# Patient Record
Sex: MALE | Race: ASIAN | NOT HISPANIC OR LATINO
[De-identification: names, ages, dates, MRNs, and addresses within clinical notes are randomized per-mention and may not be internally consistent; named-entity substitution may affect disease eponyms.]

---

## 2017-05-26 ENCOUNTER — NON-APPOINTMENT (OUTPATIENT)
Age: 62
End: 2017-05-26

## 2017-05-26 ENCOUNTER — APPOINTMENT (OUTPATIENT)
Dept: INTERNAL MEDICINE | Facility: CLINIC | Age: 62
End: 2017-05-26

## 2017-05-26 VITALS
HEIGHT: 63.5 IN | SYSTOLIC BLOOD PRESSURE: 135 MMHG | DIASTOLIC BLOOD PRESSURE: 84 MMHG | BODY MASS INDEX: 19.78 KG/M2 | WEIGHT: 113 LBS | HEART RATE: 83 BPM

## 2017-05-26 DIAGNOSIS — S29.012A STRAIN OF MUSCLE AND TENDON OF BACK WALL OF THORAX, INITIAL ENCOUNTER: ICD-10-CM

## 2017-05-26 DIAGNOSIS — Z87.891 PERSONAL HISTORY OF NICOTINE DEPENDENCE: ICD-10-CM

## 2017-05-26 DIAGNOSIS — M77.8 OTHER ENTHESOPATHIES, NOT ELSEWHERE CLASSIFIED: ICD-10-CM

## 2017-05-26 LAB
BILIRUB UR QL STRIP: NEGATIVE
CLARITY UR: CLEAR
COLLECTION METHOD: NORMAL
GLUCOSE UR-MCNC: NEGATIVE
HCG UR QL: 0.2 EU/DL
HGB UR QL STRIP.AUTO: NEGATIVE
KETONES UR-MCNC: NEGATIVE
LEUKOCYTE ESTERASE UR QL STRIP: NEGATIVE
NITRITE UR QL STRIP: NEGATIVE
PH UR STRIP: 6
PROT UR STRIP-MCNC: NEGATIVE
SP GR UR STRIP: 1.01

## 2017-05-30 LAB
25(OH)D3 SERPL-MCNC: 17 NG/ML
ALBUMIN SERPL ELPH-MCNC: 4.2 G/DL
ALP BLD-CCNC: 107 U/L
ALT SERPL-CCNC: 9 U/L
ANION GAP SERPL CALC-SCNC: 16 MMOL/L
AST SERPL-CCNC: 26 U/L
BASOPHILS # BLD AUTO: 0.05 K/UL
BASOPHILS NFR BLD AUTO: 0.7 %
BILIRUB SERPL-MCNC: 0.3 MG/DL
BUN SERPL-MCNC: 11 MG/DL
CALCIUM SERPL-MCNC: 9.2 MG/DL
CHLORIDE SERPL-SCNC: 101 MMOL/L
CHOLEST SERPL-MCNC: 169 MG/DL
CHOLEST/HDLC SERPL: 7.3 RATIO
CO2 SERPL-SCNC: 20 MMOL/L
CREAT SERPL-MCNC: 0.74 MG/DL
EOSINOPHIL # BLD AUTO: 0.38 K/UL
EOSINOPHIL NFR BLD AUTO: 5.3 %
GLUCOSE SERPL-MCNC: 71 MG/DL
HBA1C MFR BLD HPLC: 6 %
HCT VFR BLD CALC: 42.6 %
HDLC SERPL-MCNC: 23 MG/DL
HGB BLD-MCNC: 14 G/DL
IMM GRANULOCYTES NFR BLD AUTO: 0.1 %
LDLC SERPL CALC-MCNC: 106 MG/DL
LYMPHOCYTES # BLD AUTO: 2.3 K/UL
LYMPHOCYTES NFR BLD AUTO: 32.2 %
MAN DIFF?: NORMAL
MCHC RBC-ENTMCNC: 32.3 PG
MCHC RBC-ENTMCNC: 32.9 GM/DL
MCV RBC AUTO: 98.4 FL
MONOCYTES # BLD AUTO: 0.34 K/UL
MONOCYTES NFR BLD AUTO: 4.8 %
NEUTROPHILS # BLD AUTO: 4.07 K/UL
NEUTROPHILS NFR BLD AUTO: 56.9 %
PLATELET # BLD AUTO: 258 K/UL
POTASSIUM SERPL-SCNC: 3.9 MMOL/L
PROT SERPL-MCNC: 7.9 G/DL
PSA SERPL-MCNC: 0.95 NG/ML
RBC # BLD: 4.33 M/UL
RBC # FLD: 13.9 %
SAVE SPECIMEN: NORMAL
SODIUM SERPL-SCNC: 137 MMOL/L
T3FREE SERPL-MCNC: 2.8 PG/ML
T4 SERPL-MCNC: 5.8 UG/DL
TRIGL SERPL-MCNC: 202 MG/DL
TSH SERPL-ACNC: 1.7 UIU/ML
URATE SERPL-MCNC: 4.7 MG/DL
WBC # FLD AUTO: 7.15 K/UL

## 2017-06-06 ENCOUNTER — MESSAGE (OUTPATIENT)
Age: 62
End: 2017-06-06

## 2017-06-12 ENCOUNTER — APPOINTMENT (OUTPATIENT)
Dept: UROLOGY | Facility: CLINIC | Age: 62
End: 2017-06-12

## 2017-06-14 ENCOUNTER — APPOINTMENT (OUTPATIENT)
Dept: UROLOGY | Facility: CLINIC | Age: 62
End: 2017-06-14

## 2017-06-23 ENCOUNTER — APPOINTMENT (OUTPATIENT)
Dept: OPHTHALMOLOGY | Facility: CLINIC | Age: 62
End: 2017-06-23

## 2017-06-27 ENCOUNTER — APPOINTMENT (OUTPATIENT)
Dept: INTERNAL MEDICINE | Facility: CLINIC | Age: 62
End: 2017-06-27

## 2017-07-05 ENCOUNTER — NON-APPOINTMENT (OUTPATIENT)
Age: 62
End: 2017-07-05

## 2017-07-05 ENCOUNTER — APPOINTMENT (OUTPATIENT)
Dept: INTERNAL MEDICINE | Facility: CLINIC | Age: 62
End: 2017-07-05

## 2017-07-05 VITALS
SYSTOLIC BLOOD PRESSURE: 117 MMHG | BODY MASS INDEX: 19.12 KG/M2 | DIASTOLIC BLOOD PRESSURE: 79 MMHG | HEART RATE: 71 BPM | HEIGHT: 64 IN | WEIGHT: 112 LBS

## 2017-07-10 ENCOUNTER — APPOINTMENT (OUTPATIENT)
Dept: OPHTHALMOLOGY | Facility: HOSPITAL | Age: 62
End: 2017-07-10

## 2017-07-10 ENCOUNTER — OUTPATIENT (OUTPATIENT)
Dept: OUTPATIENT SERVICES | Facility: HOSPITAL | Age: 62
LOS: 1 days | End: 2017-07-10
Payer: MEDICAID

## 2017-07-10 ENCOUNTER — TRANSCRIPTION ENCOUNTER (OUTPATIENT)
Age: 62
End: 2017-07-10

## 2017-07-10 VITALS
HEIGHT: 65 IN | OXYGEN SATURATION: 97 % | DIASTOLIC BLOOD PRESSURE: 74 MMHG | RESPIRATION RATE: 20 BRPM | TEMPERATURE: 98 F | WEIGHT: 110.23 LBS | SYSTOLIC BLOOD PRESSURE: 120 MMHG | HEART RATE: 69 BPM

## 2017-07-10 VITALS
RESPIRATION RATE: 21 BRPM | OXYGEN SATURATION: 100 % | SYSTOLIC BLOOD PRESSURE: 125 MMHG | DIASTOLIC BLOOD PRESSURE: 81 MMHG | HEART RATE: 71 BPM

## 2017-07-10 DIAGNOSIS — Z98.890 OTHER SPECIFIED POSTPROCEDURAL STATES: Chronic | ICD-10-CM

## 2017-07-10 DIAGNOSIS — H25.811 COMBINED FORMS OF AGE-RELATED CATARACT, RIGHT EYE: ICD-10-CM

## 2017-07-10 PROCEDURE — 66984 XCAPSL CTRC RMVL W/O ECP: CPT | Mod: RT

## 2017-07-10 PROCEDURE — C1780: CPT

## 2017-07-10 NOTE — ASU DISCHARGE PLAN (ADULT/PEDIATRIC). - SPECIAL INSTRUCTIONS
Remove patch after 3 hours.  Throw away the white pad underneath the clear shield.  Start the following DURING THE DAY ONLY (not at night when sleeping):   Ofloxacin 1 drop every 4 hours,   Ketorolac 1 drop 2 times a day, and   Prednisolone acetate (shake well before using!) 1 drop every 2 hours.    Wear clear shield when napping or sleeping at night.  Do not rub eye as there are no stitches in the eye!  No showering tonight.    Please call [phone] if you have any questions or if you have pain or vomiting despite taking Tylenol (after 4:30 PM-9am).  Please call [phone] or [phone] during the day (9am-4:30PM) if you have any questions or concerns or pain or vomiting despite taking Tylenol.

## 2017-07-10 NOTE — ASU DISCHARGE PLAN (ADULT/PEDIATRIC). - NOTIFY
Bleeding that does not stop/Persistent Nausea and Vomiting/Swelling that continues/Pain not relieved by Medications/Fever greater than 101

## 2017-07-10 NOTE — ASU PATIENT PROFILE, ADULT - PMH
Anxiety    Benign prostatic hypertrophy    Bruxism    Cigarette smoker    HTN (hypertension)    TIA (transient ischemic attack)

## 2017-07-11 ENCOUNTER — APPOINTMENT (OUTPATIENT)
Dept: OPHTHALMOLOGY | Facility: CLINIC | Age: 62
End: 2017-07-11

## 2017-07-17 ENCOUNTER — OUTPATIENT (OUTPATIENT)
Dept: OUTPATIENT SERVICES | Facility: HOSPITAL | Age: 62
LOS: 1 days | End: 2017-07-17
Payer: MEDICAID

## 2017-07-17 ENCOUNTER — APPOINTMENT (OUTPATIENT)
Dept: OPHTHALMOLOGY | Facility: HOSPITAL | Age: 62
End: 2017-07-17

## 2017-07-17 ENCOUNTER — TRANSCRIPTION ENCOUNTER (OUTPATIENT)
Age: 62
End: 2017-07-17

## 2017-07-17 VITALS
DIASTOLIC BLOOD PRESSURE: 70 MMHG | RESPIRATION RATE: 18 BRPM | OXYGEN SATURATION: 99 % | SYSTOLIC BLOOD PRESSURE: 115 MMHG | HEART RATE: 80 BPM

## 2017-07-17 VITALS
WEIGHT: 110.67 LBS | TEMPERATURE: 98 F | OXYGEN SATURATION: 97 % | RESPIRATION RATE: 19 BRPM | HEART RATE: 77 BPM | HEIGHT: 64.5 IN | DIASTOLIC BLOOD PRESSURE: 75 MMHG | SYSTOLIC BLOOD PRESSURE: 114 MMHG

## 2017-07-17 DIAGNOSIS — Z98.890 OTHER SPECIFIED POSTPROCEDURAL STATES: Chronic | ICD-10-CM

## 2017-07-17 DIAGNOSIS — Z98.41 CATARACT EXTRACTION STATUS, RIGHT EYE: Chronic | ICD-10-CM

## 2017-07-17 DIAGNOSIS — H25.812 COMBINED FORMS OF AGE-RELATED CATARACT, LEFT EYE: ICD-10-CM

## 2017-07-17 PROCEDURE — C1780: CPT

## 2017-07-17 PROCEDURE — 66984 XCAPSL CTRC RMVL W/O ECP: CPT | Mod: LT

## 2017-07-17 NOTE — ASU DISCHARGE PLAN (ADULT/PEDIATRIC). - SPECIAL INSTRUCTIONS
Remove patch after 3 hours.  Throw away the white pad underneath the clear shield.  Start the following DURING THE DAY ONLY (not at night when sleeping):   Ofloxacin 1 drop every 4 hours,   Ketorolac 1 drop 2 times a day, and   Prednisolone acetate (shake well before using!) 1 drop every 2 hours.    Wear clear shield when napping or sleeping at night.  Do not rub eye as there are no stitches in the eye!  No showering tonight.    Please call [phone] if you have any questions or if you have pain or vomiting despite taking Tylenol (after 4:30 PM-9am).  Please call [phone] or [phone] during the day (9am-4:30PM) if you have any questions or concerns or pain or vomiting despite taking Tylenol.

## 2017-07-17 NOTE — ASU DISCHARGE PLAN (ADULT/PEDIATRIC). - YOU WERE IN THE HOSPITAL FOR:
Left eye phacoemulsification cataract extraction with Intraocular Lens implant  Left eye phacoemulsification cataract extraction with Intraocular Lens implant  Left eye phacoemulsification cataract extraction with Intraocular Lens implant

## 2017-07-18 ENCOUNTER — APPOINTMENT (OUTPATIENT)
Dept: OPHTHALMOLOGY | Facility: CLINIC | Age: 62
End: 2017-07-18

## 2017-07-19 ENCOUNTER — MEDICATION RENEWAL (OUTPATIENT)
Age: 62
End: 2017-07-19

## 2017-07-20 ENCOUNTER — APPOINTMENT (OUTPATIENT)
Dept: OPHTHALMOLOGY | Facility: CLINIC | Age: 62
End: 2017-07-20

## 2017-08-03 ENCOUNTER — RX RENEWAL (OUTPATIENT)
Age: 62
End: 2017-08-03

## 2017-08-03 DIAGNOSIS — H15.102 UNSPECIFIED EPISCLERITIS, LEFT EYE: ICD-10-CM

## 2017-08-21 ENCOUNTER — RX RENEWAL (OUTPATIENT)
Age: 62
End: 2017-08-21

## 2017-08-25 ENCOUNTER — APPOINTMENT (OUTPATIENT)
Dept: INTERNAL MEDICINE | Facility: CLINIC | Age: 62
End: 2017-08-25
Payer: MEDICAID

## 2017-08-25 VITALS
HEIGHT: 64 IN | BODY MASS INDEX: 19.12 KG/M2 | SYSTOLIC BLOOD PRESSURE: 129 MMHG | HEART RATE: 64 BPM | WEIGHT: 112 LBS | DIASTOLIC BLOOD PRESSURE: 84 MMHG

## 2017-08-25 DIAGNOSIS — R03.0 ELEVATED BLOOD-PRESSURE READING, W/OUT DIAGNOSIS OF HYPERTENSION: ICD-10-CM

## 2017-08-25 PROCEDURE — 99214 OFFICE O/P EST MOD 30 MIN: CPT | Mod: 25

## 2017-08-25 PROCEDURE — 36415 COLL VENOUS BLD VENIPUNCTURE: CPT

## 2017-08-25 RX ORDER — KETOROLAC TROMETHAMINE 4 MG/ML
0.4 SOLUTION/ DROPS OPHTHALMIC 4 TIMES DAILY
Qty: 5 | Refills: 1 | Status: DISCONTINUED | COMMUNITY
Start: 2017-07-07 | End: 2017-08-25

## 2017-08-25 RX ORDER — OFLOXACIN 3 MG/ML
0.3 SOLUTION/ DROPS OPHTHALMIC 4 TIMES DAILY
Qty: 1 | Refills: 5 | Status: DISCONTINUED | COMMUNITY
Start: 2017-07-13 | End: 2017-08-25

## 2017-08-25 RX ORDER — PREDNISOLONE ACETATE 10 MG/ML
1 SUSPENSION/ DROPS OPHTHALMIC
Qty: 1 | Refills: 5 | Status: DISCONTINUED | COMMUNITY
Start: 2017-07-13 | End: 2017-08-25

## 2017-08-25 RX ORDER — TAMSULOSIN HYDROCHLORIDE 0.4 MG/1
0.4 CAPSULE ORAL
Qty: 180 | Refills: 3 | Status: DISCONTINUED | COMMUNITY
Start: 2017-06-14 | End: 2017-08-25

## 2017-08-25 RX ORDER — LATANOPROST/PF 0.005 %
0.01 DROPS OPHTHALMIC (EYE) DAILY
Qty: 1 | Refills: 5 | Status: DISCONTINUED | COMMUNITY
Start: 2017-07-20 | End: 2017-08-25

## 2017-08-29 LAB
25(OH)D3 SERPL-MCNC: 21.7 NG/ML
ALBUMIN SERPL ELPH-MCNC: 4 G/DL
ALP BLD-CCNC: 119 U/L
ALT SERPL-CCNC: 22 U/L
ANION GAP SERPL CALC-SCNC: 17 MMOL/L
AST SERPL-CCNC: 29 U/L
BASOPHILS # BLD AUTO: 0.03 K/UL
BASOPHILS NFR BLD AUTO: 0.4 %
BILIRUB SERPL-MCNC: 0.4 MG/DL
BUN SERPL-MCNC: 14 MG/DL
CALCIUM SERPL-MCNC: 9.5 MG/DL
CHLORIDE SERPL-SCNC: 104 MMOL/L
CHOLEST SERPL-MCNC: 203 MG/DL
CHOLEST/HDLC SERPL: 6.5 RATIO
CO2 SERPL-SCNC: 22 MMOL/L
CREAT SERPL-MCNC: 0.98 MG/DL
EOSINOPHIL # BLD AUTO: 0.51 K/UL
EOSINOPHIL NFR BLD AUTO: 6.7 %
GLUCOSE SERPL-MCNC: 91 MG/DL
HBA1C MFR BLD HPLC: 6 %
HCT VFR BLD CALC: 41 %
HDLC SERPL-MCNC: 31 MG/DL
HGB BLD-MCNC: 13.7 G/DL
IMM GRANULOCYTES NFR BLD AUTO: 0.5 %
LDLC SERPL CALC-MCNC: 138 MG/DL
LYMPHOCYTES # BLD AUTO: 2.58 K/UL
LYMPHOCYTES NFR BLD AUTO: 33.9 %
MAN DIFF?: NORMAL
MCHC RBC-ENTMCNC: 32.7 PG
MCHC RBC-ENTMCNC: 33.4 GM/DL
MCV RBC AUTO: 97.9 FL
MONOCYTES # BLD AUTO: 0.38 K/UL
MONOCYTES NFR BLD AUTO: 5 %
NEUTROPHILS # BLD AUTO: 4.08 K/UL
NEUTROPHILS NFR BLD AUTO: 53.5 %
PLATELET # BLD AUTO: 222 K/UL
POTASSIUM SERPL-SCNC: 4.1 MMOL/L
PROT SERPL-MCNC: 7.9 G/DL
RBC # BLD: 4.19 M/UL
RBC # FLD: 14.5 %
SAVE SPECIMEN: NORMAL
SODIUM SERPL-SCNC: 143 MMOL/L
TRIGL SERPL-MCNC: 171 MG/DL
TSH SERPL-ACNC: 2.56 UIU/ML
WBC # FLD AUTO: 7.62 K/UL

## 2017-09-08 ENCOUNTER — APPOINTMENT (OUTPATIENT)
Dept: OPHTHALMOLOGY | Facility: CLINIC | Age: 62
End: 2017-09-08
Payer: MEDICAID

## 2017-09-08 PROCEDURE — 92083 EXTENDED VISUAL FIELD XM: CPT

## 2017-09-08 PROCEDURE — 99024 POSTOP FOLLOW-UP VISIT: CPT

## 2017-09-15 ENCOUNTER — APPOINTMENT (OUTPATIENT)
Dept: OPHTHALMOLOGY | Facility: CLINIC | Age: 62
End: 2017-09-15
Payer: MEDICAID

## 2017-09-15 PROCEDURE — 99024 POSTOP FOLLOW-UP VISIT: CPT

## 2017-09-15 PROCEDURE — 92015 DETERMINE REFRACTIVE STATE: CPT

## 2017-09-18 ENCOUNTER — APPOINTMENT (OUTPATIENT)
Dept: INTERNAL MEDICINE | Facility: CLINIC | Age: 62
End: 2017-09-18
Payer: MEDICAID

## 2017-09-18 ENCOUNTER — MESSAGE (OUTPATIENT)
Age: 62
End: 2017-09-18

## 2017-09-18 ENCOUNTER — MEDICATION RENEWAL (OUTPATIENT)
Age: 62
End: 2017-09-18

## 2017-09-18 VITALS
WEIGHT: 112 LBS | DIASTOLIC BLOOD PRESSURE: 79 MMHG | SYSTOLIC BLOOD PRESSURE: 131 MMHG | BODY MASS INDEX: 19.12 KG/M2 | HEIGHT: 64 IN | HEART RATE: 66 BPM

## 2017-09-18 DIAGNOSIS — M62.831 MUSCLE SPASM OF CALF: ICD-10-CM

## 2017-09-18 PROCEDURE — 99215 OFFICE O/P EST HI 40 MIN: CPT

## 2017-09-19 PROBLEM — M62.831 MUSCLE SPASM OF CALF: Status: ACTIVE | Noted: 2017-09-18

## 2017-09-29 ENCOUNTER — RX RENEWAL (OUTPATIENT)
Age: 62
End: 2017-09-29

## 2017-10-02 ENCOUNTER — RX RENEWAL (OUTPATIENT)
Age: 62
End: 2017-10-02

## 2017-11-17 ENCOUNTER — APPOINTMENT (OUTPATIENT)
Dept: INTERNAL MEDICINE | Facility: CLINIC | Age: 62
End: 2017-11-17
Payer: MEDICAID

## 2017-11-17 ENCOUNTER — APPOINTMENT (OUTPATIENT)
Dept: OPHTHALMOLOGY | Facility: CLINIC | Age: 62
End: 2017-11-17
Payer: MEDICAID

## 2017-11-17 VITALS
DIASTOLIC BLOOD PRESSURE: 68 MMHG | HEIGHT: 64 IN | BODY MASS INDEX: 18.95 KG/M2 | WEIGHT: 111 LBS | HEART RATE: 65 BPM | SYSTOLIC BLOOD PRESSURE: 104 MMHG

## 2017-11-17 LAB
BILIRUB UR QL STRIP: NORMAL
CLARITY UR: CLEAR
COLLECTION METHOD: NORMAL
GLUCOSE UR-MCNC: NORMAL
HCG UR QL: 0.2 EU/DL
HGB UR QL STRIP.AUTO: NORMAL
KETONES UR-MCNC: NORMAL
LEUKOCYTE ESTERASE UR QL STRIP: NORMAL
NITRITE UR QL STRIP: NORMAL
PH UR STRIP: 6
PROT UR STRIP-MCNC: NORMAL
SP GR UR STRIP: 1.01

## 2017-11-17 PROCEDURE — 92250 FUNDUS PHOTOGRAPHY W/I&R: CPT

## 2017-11-17 PROCEDURE — 36415 COLL VENOUS BLD VENIPUNCTURE: CPT

## 2017-11-17 PROCEDURE — 99214 OFFICE O/P EST MOD 30 MIN: CPT | Mod: 25

## 2017-11-17 PROCEDURE — 81003 URINALYSIS AUTO W/O SCOPE: CPT | Mod: QW

## 2017-11-17 PROCEDURE — 92014 COMPRE OPH EXAM EST PT 1/>: CPT

## 2017-11-17 RX ORDER — LATANOPROST/PF 0.005 %
0.01 DROPS OPHTHALMIC (EYE) DAILY
Qty: 3 | Refills: 1 | Status: DISCONTINUED | COMMUNITY
Start: 2017-11-17 | End: 2017-11-17

## 2017-11-17 RX ORDER — DORZOLAMIDE HYDROCHLORIDE TIMOLOL MALEATE 20; 5 MG/ML; MG/ML
22.3-6.8 SOLUTION/ DROPS OPHTHALMIC 3 TIMES DAILY
Qty: 3 | Refills: 3 | Status: DISCONTINUED | COMMUNITY
Start: 2017-11-17 | End: 2017-11-17

## 2017-11-17 RX ORDER — OXYBUTYNIN CHLORIDE 5 MG/1
5 TABLET, EXTENDED RELEASE ORAL DAILY
Qty: 90 | Refills: 3 | Status: DISCONTINUED | COMMUNITY
Start: 2017-06-14 | End: 2017-11-17

## 2017-11-17 RX ORDER — BRIMONIDINE TARTRATE 2 MG/MG
0.2 SOLUTION/ DROPS OPHTHALMIC
Qty: 3 | Refills: 5 | Status: DISCONTINUED | COMMUNITY
Start: 2017-11-17 | End: 2017-11-17

## 2017-11-21 LAB
25(OH)D3 SERPL-MCNC: 28.2 NG/ML
ALBUMIN SERPL ELPH-MCNC: 4.3 G/DL
ALP BLD-CCNC: 108 U/L
ALT SERPL-CCNC: 10 U/L
ANION GAP SERPL CALC-SCNC: 14 MMOL/L
AST SERPL-CCNC: 21 U/L
BASOPHILS # BLD AUTO: 0.04 K/UL
BASOPHILS NFR BLD AUTO: 0.5 %
BILIRUB SERPL-MCNC: 0.3 MG/DL
BUN SERPL-MCNC: 13 MG/DL
CALCIUM SERPL-MCNC: 9.6 MG/DL
CHLORIDE SERPL-SCNC: 104 MMOL/L
CHOLEST SERPL-MCNC: 190 MG/DL
CHOLEST/HDLC SERPL: 6.8 RATIO
CO2 SERPL-SCNC: 22 MMOL/L
CREAT SERPL-MCNC: 0.92 MG/DL
EOSINOPHIL # BLD AUTO: 0.43 K/UL
EOSINOPHIL NFR BLD AUTO: 5.3
GLUCOSE SERPL-MCNC: 86 MG/DL
HBA1C MFR BLD HPLC: 6.1 %
HCT VFR BLD CALC: 39.8 %
HDLC SERPL-MCNC: 28 MG/DL
HGB BLD-MCNC: 13.3 G/DL
IMM GRANULOCYTES NFR BLD AUTO: 0.2 %
LDLC SERPL CALC-MCNC: 120 MG/DL
LYMPHOCYTES # BLD AUTO: 2.74 K/UL
LYMPHOCYTES NFR BLD AUTO: 33.7 %
MAN DIFF?: NORMAL
MCHC RBC-ENTMCNC: 33.4 GM/DL
MCHC RBC-ENTMCNC: 33.5 PG
MCV RBC AUTO: 100.3 FL
MONOCYTES # BLD AUTO: 0.43 K/UL
MONOCYTES NFR BLD AUTO: 5.3 %
NEUTROPHILS # BLD AUTO: 4.47 K/UL
NEUTROPHILS NFR BLD AUTO: 55 %
PLATELET # BLD AUTO: 250 K/UL
POTASSIUM SERPL-SCNC: 4.4 MMOL/L
PROT SERPL-MCNC: 8 G/DL
RBC # BLD: 3.97 M/UL
RBC # FLD: 13.1 %
SAVE SPECIMEN: NORMAL
SODIUM SERPL-SCNC: 140 MMOL/L
T3FREE SERPL-MCNC: 3.06 PG/ML
T4 SERPL-MCNC: 5.5 UG/DL
TRIGL SERPL-MCNC: 208 MG/DL
TSH SERPL-ACNC: 2.83 UIU/ML
WBC # FLD AUTO: 8.13 K/UL

## 2017-11-24 ENCOUNTER — RX RENEWAL (OUTPATIENT)
Age: 62
End: 2017-11-24

## 2017-11-30 RX ORDER — BRINZOLAMIDE 10 MG/ML
1 SUSPENSION/ DROPS OPHTHALMIC
Qty: 3 | Refills: 1 | Status: ACTIVE | COMMUNITY
Start: 2017-11-27 | End: 1900-01-01

## 2017-12-11 ENCOUNTER — APPOINTMENT (OUTPATIENT)
Dept: UROLOGY | Facility: CLINIC | Age: 62
End: 2017-12-11
Payer: MEDICAID

## 2017-12-11 PROCEDURE — 99213 OFFICE O/P EST LOW 20 MIN: CPT

## 2017-12-15 ENCOUNTER — APPOINTMENT (OUTPATIENT)
Dept: INTERNAL MEDICINE | Facility: CLINIC | Age: 62
End: 2017-12-15
Payer: MEDICAID

## 2017-12-15 ENCOUNTER — APPOINTMENT (OUTPATIENT)
Dept: ORTHOPEDIC SURGERY | Facility: CLINIC | Age: 62
End: 2017-12-15

## 2017-12-15 VITALS
BODY MASS INDEX: 20.14 KG/M2 | WEIGHT: 118 LBS | HEART RATE: 75 BPM | HEIGHT: 64 IN | SYSTOLIC BLOOD PRESSURE: 138 MMHG | DIASTOLIC BLOOD PRESSURE: 88 MMHG

## 2017-12-15 PROCEDURE — 99214 OFFICE O/P EST MOD 30 MIN: CPT

## 2018-01-08 ENCOUNTER — NON-APPOINTMENT (OUTPATIENT)
Age: 63
End: 2018-01-08

## 2018-01-08 ENCOUNTER — APPOINTMENT (OUTPATIENT)
Dept: CARDIOLOGY | Facility: CLINIC | Age: 63
End: 2018-01-08
Payer: MEDICAID

## 2018-01-08 VITALS
BODY MASS INDEX: 19.57 KG/M2 | WEIGHT: 114 LBS | HEART RATE: 63 BPM | OXYGEN SATURATION: 95 % | SYSTOLIC BLOOD PRESSURE: 144 MMHG | DIASTOLIC BLOOD PRESSURE: 77 MMHG

## 2018-01-08 VITALS — DIASTOLIC BLOOD PRESSURE: 76 MMHG | SYSTOLIC BLOOD PRESSURE: 118 MMHG

## 2018-01-08 DIAGNOSIS — I25.2 OLD MYOCARDIAL INFARCTION: ICD-10-CM

## 2018-01-08 PROCEDURE — 99205 OFFICE O/P NEW HI 60 MIN: CPT

## 2018-01-08 PROCEDURE — 93000 ELECTROCARDIOGRAM COMPLETE: CPT

## 2018-01-12 ENCOUNTER — RX RENEWAL (OUTPATIENT)
Age: 63
End: 2018-01-12

## 2018-01-23 ENCOUNTER — APPOINTMENT (OUTPATIENT)
Dept: CARDIOLOGY | Facility: CLINIC | Age: 63
End: 2018-01-23
Payer: MEDICAID

## 2018-01-23 PROCEDURE — 93978 VASCULAR STUDY: CPT

## 2018-01-23 PROCEDURE — 93923 UPR/LXTR ART STDY 3+ LVLS: CPT

## 2018-02-12 ENCOUNTER — RX RENEWAL (OUTPATIENT)
Age: 63
End: 2018-02-12

## 2018-03-13 ENCOUNTER — RX RENEWAL (OUTPATIENT)
Age: 63
End: 2018-03-13

## 2018-03-19 ENCOUNTER — MEDICATION RENEWAL (OUTPATIENT)
Age: 63
End: 2018-03-19

## 2018-05-18 ENCOUNTER — APPOINTMENT (OUTPATIENT)
Dept: OPHTHALMOLOGY | Facility: CLINIC | Age: 63
End: 2018-05-18

## 2018-05-23 ENCOUNTER — RX RENEWAL (OUTPATIENT)
Age: 63
End: 2018-05-23

## 2018-06-11 ENCOUNTER — APPOINTMENT (OUTPATIENT)
Dept: CARDIOLOGY | Facility: CLINIC | Age: 63
End: 2018-06-11

## 2018-06-13 ENCOUNTER — RX RENEWAL (OUTPATIENT)
Age: 63
End: 2018-06-13

## 2018-06-15 ENCOUNTER — APPOINTMENT (OUTPATIENT)
Dept: UROLOGY | Facility: CLINIC | Age: 63
End: 2018-06-15

## 2018-07-02 ENCOUNTER — RX RENEWAL (OUTPATIENT)
Age: 63
End: 2018-07-02

## 2018-07-20 ENCOUNTER — RX RENEWAL (OUTPATIENT)
Age: 63
End: 2018-07-20

## 2018-07-30 PROBLEM — F17.210 NICOTINE DEPENDENCE, CIGARETTES, UNCOMPLICATED: Chronic | Status: ACTIVE | Noted: 2017-07-10

## 2018-07-30 PROBLEM — N40.0 BENIGN PROSTATIC HYPERPLASIA WITHOUT LOWER URINARY TRACT SYMPTOMS: Chronic | Status: ACTIVE | Noted: 2017-07-10

## 2018-07-30 PROBLEM — G45.9 TRANSIENT CEREBRAL ISCHEMIC ATTACK, UNSPECIFIED: Chronic | Status: ACTIVE | Noted: 2017-07-10

## 2018-08-01 ENCOUNTER — APPOINTMENT (OUTPATIENT)
Dept: UROLOGY | Facility: CLINIC | Age: 63
End: 2018-08-01
Payer: MEDICAID

## 2018-08-01 VITALS
RESPIRATION RATE: 17 BRPM | SYSTOLIC BLOOD PRESSURE: 124 MMHG | HEART RATE: 76 BPM | TEMPERATURE: 98 F | DIASTOLIC BLOOD PRESSURE: 82 MMHG | WEIGHT: 114 LBS | BODY MASS INDEX: 19.46 KG/M2 | HEIGHT: 64 IN

## 2018-08-01 PROCEDURE — 99213 OFFICE O/P EST LOW 20 MIN: CPT

## 2018-08-03 ENCOUNTER — APPOINTMENT (OUTPATIENT)
Dept: INTERNAL MEDICINE | Facility: CLINIC | Age: 63
End: 2018-08-03
Payer: MEDICAID

## 2018-08-03 VITALS
DIASTOLIC BLOOD PRESSURE: 77 MMHG | HEART RATE: 78 BPM | BODY MASS INDEX: 20.55 KG/M2 | WEIGHT: 116 LBS | SYSTOLIC BLOOD PRESSURE: 110 MMHG | HEIGHT: 63 IN | TEMPERATURE: 98.3 F

## 2018-08-03 DIAGNOSIS — I73.9 PERIPHERAL VASCULAR DISEASE, UNSPECIFIED: ICD-10-CM

## 2018-08-03 DIAGNOSIS — N40.1 BENIGN PROSTATIC HYPERPLASIA WITH LOWER URINARY TRACT SYMPMS: ICD-10-CM

## 2018-08-03 DIAGNOSIS — R33.9 RETENTION OF URINE, UNSPECIFIED: ICD-10-CM

## 2018-08-03 DIAGNOSIS — G47.00 INSOMNIA, UNSPECIFIED: ICD-10-CM

## 2018-08-03 DIAGNOSIS — K08.89 OTHER SPECIFIED DISORDERS OF TEETH AND SUPPORTING STRUCTURES: ICD-10-CM

## 2018-08-03 DIAGNOSIS — R52 PAIN, UNSPECIFIED: ICD-10-CM

## 2018-08-03 DIAGNOSIS — Z87.440 PERSONAL HISTORY OF URINARY (TRACT) INFECTIONS: ICD-10-CM

## 2018-08-03 DIAGNOSIS — Z87.448 PERSONAL HISTORY OF OTHER DISEASES OF URINARY SYSTEM: ICD-10-CM

## 2018-08-03 DIAGNOSIS — R35.1 BENIGN PROSTATIC HYPERPLASIA WITH LOWER URINARY TRACT SYMPMS: ICD-10-CM

## 2018-08-03 LAB
BILIRUB UR QL STRIP: NORMAL
CLARITY UR: CLEAR
COLLECTION METHOD: NORMAL
GLUCOSE UR-MCNC: NORMAL
HCG UR QL: 0.2 EU/DL
HGB UR QL STRIP.AUTO: NORMAL
KETONES UR-MCNC: NORMAL
LEUKOCYTE ESTERASE UR QL STRIP: NORMAL
NITRITE UR QL STRIP: NORMAL
PH UR STRIP: 7
PROT UR STRIP-MCNC: NORMAL
SP GR UR STRIP: 1.01

## 2018-08-03 PROCEDURE — 99396 PREV VISIT EST AGE 40-64: CPT | Mod: 25

## 2018-08-03 PROCEDURE — 81003 URINALYSIS AUTO W/O SCOPE: CPT | Mod: QW

## 2018-08-03 PROCEDURE — 36415 COLL VENOUS BLD VENIPUNCTURE: CPT

## 2018-08-03 RX ORDER — BLOOD PRESSURE TEST KIT-MEDIUM
KIT MISCELLANEOUS
Qty: 1 | Refills: 0 | Status: ACTIVE | OUTPATIENT
Start: 2018-08-03

## 2018-08-03 RX ORDER — CELECOXIB 200 MG/1
200 CAPSULE ORAL
Qty: 90 | Refills: 3 | Status: DISCONTINUED | COMMUNITY
Start: 2017-05-26 | End: 2018-08-03

## 2018-08-03 RX ORDER — CYCLOBENZAPRINE HYDROCHLORIDE 5 MG/1
5 TABLET, FILM COATED ORAL
Qty: 90 | Refills: 3 | Status: DISCONTINUED | COMMUNITY
Start: 2017-09-18 | End: 2018-08-03

## 2018-08-03 NOTE — PHYSICAL EXAM
[No Acute Distress] : no acute distress [Well Nourished] : well nourished [Well Developed] : well developed [Well-Appearing] : well-appearing [Normal Sclera/Conjunctiva] : normal sclera/conjunctiva [PERRL] : pupils equal round and reactive to light [EOMI] : extraocular movements intact [No JVD] : no jugular venous distention [Supple] : supple [No Lymphadenopathy] : no lymphadenopathy [Thyroid Normal, No Nodules] : the thyroid was normal and there were no nodules present [No Respiratory Distress] : no respiratory distress  [Clear to Auscultation] : lungs were clear to auscultation bilaterally [No Accessory Muscle Use] : no accessory muscle use [Normal Rate] : normal rate  [Regular Rhythm] : with a regular rhythm [Normal S1, S2] : normal S1 and S2 [No Murmur] : no murmur heard [No Carotid Bruits] : no carotid bruits [No Abdominal Bruit] : a ~M bruit was not heard ~T in the abdomen [No Varicosities] : no varicosities [Pedal Pulses Present] : the pedal pulses are present [No Edema] : there was no peripheral edema [No Extremity Clubbing/Cyanosis] : no extremity clubbing/cyanosis [No Palpable Aorta] : no palpable aorta [Soft] : abdomen soft [Non Tender] : non-tender [Non-distended] : non-distended [No Masses] : no abdominal mass palpated [No HSM] : no HSM [Normal Bowel Sounds] : normal bowel sounds [Normal Sphincter Tone] : normal sphincter tone [Normal Supraclavicular Nodes] : no supraclavicular lymphadenopathy [Normal Posterior Cervical Nodes] : no posterior cervical lymphadenopathy [Normal Anterior Cervical Nodes] : no anterior cervical lymphadenopathy [No CVA Tenderness] : no CVA  tenderness [No Spinal Tenderness] : no spinal tenderness [No Rash] : no rash [Normal Gait] : normal gait [Coordination Grossly Intact] : coordination grossly intact [No Focal Deficits] : no focal deficits [Deep Tendon Reflexes (DTR)] : deep tendon reflexes were 2+ and symmetric [Speech Grossly Normal] : speech grossly normal [Memory Grossly Normal] : memory grossly normal [Normal Affect] : the affect was normal [Normal Mood] : the mood was normal [Normal Insight/Judgement] : insight and judgment were intact [Stool Occult Blood] : no occult stool [de-identified] : Poor dentition sever plaquing, the loseteeth have been extracted.   [FreeTextEntry1] : prostate normal  [de-identified] : Feet cool not cold

## 2018-08-03 NOTE — HISTORY OF PRESENT ILLNESS
[de-identified] : 62 year old man returned from [facility].  HE complains  weakness in the waist down to the legs. Had these problems for the past year but was getting worse in [facility].  When standing cant stand for more than 10 minutes.    Hurts burning type pain.  Tried a methocarbamol in [facility] did well on that.  \par \par Prior to leaving for [facility] he had a vascular workup but had not followed up with that.   HE is taking the amlodipine as a vasodilaator.  \par Son states that taking methocarbamol and gabapentin did help while in [facility].  \par Reduced smoking to 3 a day.  \par \par Hes sleeping well with the help of the zolpidem.

## 2018-08-03 NOTE — REVIEW OF SYSTEMS
[Muscle Pain] : muscle pain [Joint Swelling] : joint swelling [Negative] : Heme/Lymph [FreeTextEntry9] : Leg pain and cramping.

## 2018-08-03 NOTE — ASSESSMENT
[FreeTextEntry1] : 62 year old man returns for cpe. Check CBC, CMP, HgA1c, Lipid profile, TSH, Vitamin D, UA \par Patient has symptoms of claudication, being on amlodipine did not seem to help much >Remains on aspirin already. Recommended starting cilostazol. Recommended he return to Dr. Luke for follow up.  \par \par Can change cyclobenzaprine to methocarbamol explained they cant be used together. \par \par Continue Ambien for insomnia.  \par \par Continue tamsulosin for urinary retention.  \par \par Patient did improve with gabapentin start 100mg PO TID.

## 2018-08-07 ENCOUNTER — MESSAGE (OUTPATIENT)
Age: 63
End: 2018-08-07

## 2018-08-07 LAB
25(OH)D3 SERPL-MCNC: 26.6 NG/ML
ALBUMIN SERPL ELPH-MCNC: 4.4 G/DL
ALP BLD-CCNC: 100 U/L
ALT SERPL-CCNC: 18 U/L
ANION GAP SERPL CALC-SCNC: 15 MMOL/L
AST SERPL-CCNC: 25 U/L
BASOPHILS # BLD AUTO: 0.05 K/UL
BASOPHILS NFR BLD AUTO: 0.6 %
BILIRUB SERPL-MCNC: 0.3 MG/DL
BUN SERPL-MCNC: 12 MG/DL
CALCIUM SERPL-MCNC: 9.8 MG/DL
CHLORIDE SERPL-SCNC: 103 MMOL/L
CHOLEST SERPL-MCNC: 122 MG/DL
CHOLEST/HDLC SERPL: 5.5 RATIO
CO2 SERPL-SCNC: 23 MMOL/L
CREAT SERPL-MCNC: 1.08 MG/DL
EOSINOPHIL # BLD AUTO: 0.48 K/UL
EOSINOPHIL NFR BLD AUTO: 5.5 %
GLUCOSE SERPL-MCNC: 56 MG/DL
HBA1C MFR BLD HPLC: 6.1 %
HCT VFR BLD CALC: 43.3 %
HDLC SERPL-MCNC: 22 MG/DL
HGB BLD-MCNC: 14.5 G/DL
IMM GRANULOCYTES NFR BLD AUTO: 0.3 %
LDLC SERPL CALC-MCNC: 46 MG/DL
LYMPHOCYTES # BLD AUTO: 3.11 K/UL
LYMPHOCYTES NFR BLD AUTO: 36 %
MAN DIFF?: NORMAL
MCHC RBC-ENTMCNC: 33.3 PG
MCHC RBC-ENTMCNC: 33.5 GM/DL
MCV RBC AUTO: 99.3 FL
MONOCYTES # BLD AUTO: 0.79 K/UL
MONOCYTES NFR BLD AUTO: 9.1 %
NEUTROPHILS # BLD AUTO: 4.19 K/UL
NEUTROPHILS NFR BLD AUTO: 48.5 %
PLATELET # BLD AUTO: 261 K/UL
POTASSIUM SERPL-SCNC: 4.1 MMOL/L
PROT SERPL-MCNC: 8.1 G/DL
PSA SERPL-MCNC: 1.22 NG/ML
RBC # BLD: 4.36 M/UL
RBC # FLD: 13.4 %
SAVE SPECIMEN: NORMAL
SODIUM SERPL-SCNC: 141 MMOL/L
T3FREE SERPL-MCNC: 2.62 PG/ML
T4 SERPL-MCNC: 6 UG/DL
TRIGL SERPL-MCNC: 272 MG/DL
TSH SERPL-ACNC: 2.2 UIU/ML
WBC # FLD AUTO: 8.65 K/UL

## 2018-09-07 ENCOUNTER — APPOINTMENT (OUTPATIENT)
Dept: OPHTHALMOLOGY | Facility: CLINIC | Age: 63
End: 2018-09-07

## 2018-09-11 ENCOUNTER — MEDICATION RENEWAL (OUTPATIENT)
Age: 63
End: 2018-09-11

## 2018-09-11 RX ORDER — TIMOLOL MALEATE 5 MG/ML
0.5 SOLUTION OPHTHALMIC
Qty: 3 | Refills: 0 | Status: ACTIVE | COMMUNITY
Start: 2017-11-24 | End: 1900-01-01

## 2018-09-14 ENCOUNTER — APPOINTMENT (OUTPATIENT)
Dept: CARDIOLOGY | Facility: CLINIC | Age: 63
End: 2018-09-14

## 2018-10-05 ENCOUNTER — APPOINTMENT (OUTPATIENT)
Dept: OPHTHALMOLOGY | Facility: CLINIC | Age: 63
End: 2018-10-05

## 2018-10-05 DIAGNOSIS — H25.13 AGE-RELATED NUCLEAR CATARACT, BILATERAL: ICD-10-CM

## 2018-10-08 ENCOUNTER — RX RENEWAL (OUTPATIENT)
Age: 63
End: 2018-10-08

## 2018-10-18 ENCOUNTER — MEDICATION RENEWAL (OUTPATIENT)
Age: 63
End: 2018-10-18

## 2018-11-09 ENCOUNTER — APPOINTMENT (OUTPATIENT)
Dept: INTERNAL MEDICINE | Facility: CLINIC | Age: 63
End: 2018-11-09
Payer: MEDICAID

## 2018-11-09 VITALS
DIASTOLIC BLOOD PRESSURE: 88 MMHG | WEIGHT: 122 LBS | BODY MASS INDEX: 20.83 KG/M2 | HEIGHT: 64 IN | SYSTOLIC BLOOD PRESSURE: 147 MMHG | HEART RATE: 74 BPM

## 2018-11-09 DIAGNOSIS — I73.9 PERIPHERAL VASCULAR DISEASE, UNSPECIFIED: ICD-10-CM

## 2018-11-09 PROCEDURE — 36415 COLL VENOUS BLD VENIPUNCTURE: CPT

## 2018-11-09 PROCEDURE — 99214 OFFICE O/P EST MOD 30 MIN: CPT | Mod: 25

## 2018-11-12 LAB
25(OH)D3 SERPL-MCNC: 51.9 NG/ML
ALBUMIN SERPL ELPH-MCNC: 4.5 G/DL
ALP BLD-CCNC: 106 U/L
ALT SERPL-CCNC: 27 U/L
ANION GAP SERPL CALC-SCNC: 12 MMOL/L
AST SERPL-CCNC: 30 U/L
BASOPHILS # BLD AUTO: 0.06 K/UL
BASOPHILS NFR BLD AUTO: 0.8 %
BILIRUB SERPL-MCNC: 0.2 MG/DL
BUN SERPL-MCNC: 14 MG/DL
CALCIUM SERPL-MCNC: 9.4 MG/DL
CHLORIDE SERPL-SCNC: 108 MMOL/L
CHOLEST SERPL-MCNC: 219 MG/DL
CHOLEST/HDLC SERPL: 11 RATIO
CO2 SERPL-SCNC: 23 MMOL/L
CREAT SERPL-MCNC: 0.93 MG/DL
EOSINOPHIL # BLD AUTO: 0.41 K/UL
EOSINOPHIL NFR BLD AUTO: 5.2 %
GLUCOSE SERPL-MCNC: 75 MG/DL
HBA1C MFR BLD HPLC: 6.3 %
HCT VFR BLD CALC: 44.5 %
HDLC SERPL-MCNC: 20 MG/DL
HGB BLD-MCNC: 15.5 G/DL
IMM GRANULOCYTES NFR BLD AUTO: 0.3 %
LDLC SERPL CALC-MCNC: NORMAL
LYMPHOCYTES # BLD AUTO: 2.57 K/UL
LYMPHOCYTES NFR BLD AUTO: 32.3 %
MAN DIFF?: NORMAL
MCHC RBC-ENTMCNC: 34.1 PG
MCHC RBC-ENTMCNC: 34.8 GM/DL
MCV RBC AUTO: 98 FL
MONOCYTES # BLD AUTO: 0.54 K/UL
MONOCYTES NFR BLD AUTO: 6.8 %
NEUTROPHILS # BLD AUTO: 4.35 K/UL
NEUTROPHILS NFR BLD AUTO: 54.6 %
PLATELET # BLD AUTO: 267 K/UL
POTASSIUM SERPL-SCNC: 4 MMOL/L
PROT SERPL-MCNC: 8 G/DL
RBC # BLD: 4.54 M/UL
RBC # FLD: 13.9 %
SAVE SPECIMEN: NORMAL
SODIUM SERPL-SCNC: 143 MMOL/L
TRIGL SERPL-MCNC: 492 MG/DL
TSH SERPL-ACNC: 2.92 UIU/ML
WBC # FLD AUTO: 7.95 K/UL

## 2018-11-12 NOTE — ASSESSMENT
[FreeTextEntry1] : Insomnia doing better with ambien\par \par PVD, non surgical, improving with medical therapy, continue Amlodipine for Ca channel blocker, aspirin 81mg. CArdiology reccoemdned starting a statin for PVD, cholesterol was never really high but this could be helpful, start atorvastatin 10mg daily.  \par Check CBC, CMP, HgA1c, Lipid profile, TSH, Vitamin D, UA \par Continue oxybutynin ER for urinary incontinence.  \par \par Spent > 25 minutes in direct patient care and and addressed all questions and concerns.  >50% of this time was in direct face to face contact with patient during exam and counseling.

## 2018-11-12 NOTE — HISTORY OF PRESENT ILLNESS
[de-identified] : Patient still feeling tired below the waist after walking a few feet. BUrning pains on exertion, this has been getting better. Feels like squeezing after walking Feet less painful.  \par \par Dilfenac is helping with his arthritic pains.  \par Gained 8 lbs intentionally, eating better.  \par

## 2018-11-12 NOTE — REVIEW OF SYSTEMS
[Negative] : Heme/Lymph [FreeTextEntry5] : LE claudication symptoms  [FreeTextEntry9] : Burning pains in the legs.

## 2018-11-12 NOTE — PHYSICAL EXAM
[No Acute Distress] : no acute distress [Well Nourished] : well nourished [Well Developed] : well developed [Well-Appearing] : well-appearing [Normal Sclera/Conjunctiva] : normal sclera/conjunctiva [PERRL] : pupils equal round and reactive to light [EOMI] : extraocular movements intact [No JVD] : no jugular venous distention [Supple] : supple [No Lymphadenopathy] : no lymphadenopathy [Thyroid Normal, No Nodules] : the thyroid was normal and there were no nodules present [No Respiratory Distress] : no respiratory distress  [Clear to Auscultation] : lungs were clear to auscultation bilaterally [No Accessory Muscle Use] : no accessory muscle use [Normal Rate] : normal rate  [Regular Rhythm] : with a regular rhythm [Normal S1, S2] : normal S1 and S2 [No Murmur] : no murmur heard [Normal Sphincter Tone] : normal sphincter tone [Normal Supraclavicular Nodes] : no supraclavicular lymphadenopathy [Normal Posterior Cervical Nodes] : no posterior cervical lymphadenopathy [Normal Anterior Cervical Nodes] : no anterior cervical lymphadenopathy [No Spinal Tenderness] : no spinal tenderness [No Rash] : no rash [Normal Gait] : normal gait [Coordination Grossly Intact] : coordination grossly intact [No Focal Deficits] : no focal deficits [Deep Tendon Reflexes (DTR)] : deep tendon reflexes were 2+ and symmetric [Speech Grossly Normal] : speech grossly normal [Memory Grossly Normal] : memory grossly normal [Normal Affect] : the affect was normal [Normal Mood] : the mood was normal [Normal Insight/Judgement] : insight and judgment were intact [de-identified] : Poor dentition sever plaquing, the loseteeth have been extracted.   [de-identified] : Hands and feet cool not cold.   [de-identified] : Feet cool not cold [de-identified] : Erythema on feet and skin changes resolved.

## 2018-11-14 ENCOUNTER — MEDICATION RENEWAL (OUTPATIENT)
Age: 63
End: 2018-11-14

## 2018-11-15 ENCOUNTER — MESSAGE (OUTPATIENT)
Age: 63
End: 2018-11-15

## 2018-11-16 ENCOUNTER — APPOINTMENT (OUTPATIENT)
Dept: OPHTHALMOLOGY | Facility: CLINIC | Age: 63
End: 2018-11-16
Payer: MEDICAID

## 2018-11-16 DIAGNOSIS — H40.003 PREGLAUCOMA, UNSPECIFIED, BILATERAL: ICD-10-CM

## 2018-11-16 DIAGNOSIS — H01.136 ECZEMATOUS DERMATITIS OF LEFT EYE, UNSPECIFIED EYELID: ICD-10-CM

## 2018-11-16 DIAGNOSIS — D23.12 OTHER BENIGN NEOPLASM OF SKIN OF LEFT EYELID, INCLUDING CANTHUS: ICD-10-CM

## 2018-11-16 PROCEDURE — 92133 CPTRZD OPH DX IMG PST SGM ON: CPT

## 2018-11-16 PROCEDURE — 92083 EXTENDED VISUAL FIELD XM: CPT

## 2018-11-16 PROCEDURE — 92014 COMPRE OPH EXAM EST PT 1/>: CPT

## 2018-11-16 PROCEDURE — 92285 EXTERNAL OCULAR PHOTOGRAPHY: CPT

## 2018-12-07 ENCOUNTER — APPOINTMENT (OUTPATIENT)
Dept: UROLOGY | Facility: CLINIC | Age: 63
End: 2018-12-07

## 2018-12-17 ENCOUNTER — RX RENEWAL (OUTPATIENT)
Age: 63
End: 2018-12-17

## 2018-12-18 ENCOUNTER — MESSAGE (OUTPATIENT)
Age: 63
End: 2018-12-18

## 2018-12-24 ENCOUNTER — MESSAGE (OUTPATIENT)
Age: 63
End: 2018-12-24

## 2018-12-24 ENCOUNTER — APPOINTMENT (OUTPATIENT)
Dept: UROLOGY | Facility: CLINIC | Age: 63
End: 2018-12-24
Payer: MEDICAID

## 2018-12-24 VITALS
HEIGHT: 64 IN | WEIGHT: 122 LBS | HEART RATE: 88 BPM | BODY MASS INDEX: 20.83 KG/M2 | DIASTOLIC BLOOD PRESSURE: 94 MMHG | RESPIRATION RATE: 17 BRPM | SYSTOLIC BLOOD PRESSURE: 147 MMHG

## 2018-12-24 PROCEDURE — 99213 OFFICE O/P EST LOW 20 MIN: CPT

## 2018-12-26 ENCOUNTER — RX RENEWAL (OUTPATIENT)
Age: 63
End: 2018-12-26

## 2019-01-01 ENCOUNTER — OUTPATIENT (OUTPATIENT)
Dept: OUTPATIENT SERVICES | Facility: HOSPITAL | Age: 64
LOS: 1 days | End: 2019-01-01
Payer: MEDICAID

## 2019-01-01 DIAGNOSIS — Z98.41 CATARACT EXTRACTION STATUS, RIGHT EYE: Chronic | ICD-10-CM

## 2019-01-01 DIAGNOSIS — Z98.890 OTHER SPECIFIED POSTPROCEDURAL STATES: Chronic | ICD-10-CM

## 2019-01-01 PROCEDURE — G9001: CPT

## 2019-01-02 ENCOUNTER — MEDICATION RENEWAL (OUTPATIENT)
Age: 64
End: 2019-01-02

## 2019-01-04 ENCOUNTER — MESSAGE (OUTPATIENT)
Age: 64
End: 2019-01-04

## 2019-01-11 ENCOUNTER — APPOINTMENT (OUTPATIENT)
Dept: OPHTHALMOLOGY | Facility: CLINIC | Age: 64
End: 2019-01-11

## 2019-01-18 ENCOUNTER — APPOINTMENT (OUTPATIENT)
Dept: INTERNAL MEDICINE | Facility: CLINIC | Age: 64
End: 2019-01-18

## 2019-01-22 DIAGNOSIS — Z71.89 OTHER SPECIFIED COUNSELING: ICD-10-CM

## 2019-01-25 ENCOUNTER — APPOINTMENT (OUTPATIENT)
Dept: INTERNAL MEDICINE | Facility: CLINIC | Age: 64
End: 2019-01-25
Payer: MEDICAID

## 2019-01-25 VITALS
DIASTOLIC BLOOD PRESSURE: 74 MMHG | HEART RATE: 83 BPM | WEIGHT: 113 LBS | HEIGHT: 64 IN | BODY MASS INDEX: 19.29 KG/M2 | SYSTOLIC BLOOD PRESSURE: 112 MMHG | TEMPERATURE: 98.4 F

## 2019-01-25 DIAGNOSIS — R13.10 DYSPHAGIA, UNSPECIFIED: ICD-10-CM

## 2019-01-25 DIAGNOSIS — R63.4 ABNORMAL WEIGHT LOSS: ICD-10-CM

## 2019-01-25 PROCEDURE — 36415 COLL VENOUS BLD VENIPUNCTURE: CPT

## 2019-01-25 PROCEDURE — 99214 OFFICE O/P EST MOD 30 MIN: CPT | Mod: 25

## 2019-01-27 PROBLEM — R13.10 DIFFICULTY SWALLOWING: Status: ACTIVE | Noted: 2019-01-27

## 2019-01-27 NOTE — HISTORY OF PRESENT ILLNESS
[de-identified] : Severe acid reflux, taking the Famotidine 40mg daily divided doses.  Lost appetite, lost 7-8 lbs.  Also mixing with Feels food in his throat.  Early satiety.   Food sometimes feels stuck no vomiting.  \par \par No history of a FOBT.  \par \par Chronic constipation.

## 2019-01-27 NOTE — ASSESSMENT
[FreeTextEntry1] : Patient with worsening GERD, taking the Famotidine without relief, has been using liquid antacids already as well. Recommended switching ot a PPI, patient requests taking one that is a liquid, can try the lansoprazole which comes as a powder. Recommended he see GI , needed a colonoscopy anway, however now could use an EGD r/o H pylori and r/o an ulcer or esophageal stricture.  \par Renewed other medications.  \par Informed patient that I will be transferring to a different department with the [facility] and can no longer be their primary.  There are alternative offices which they can continue their care at within the health system complete withe same medical records.

## 2019-01-27 NOTE — PHYSICAL EXAM
[No Acute Distress] : no acute distress [Well Nourished] : well nourished [Well Developed] : well developed [Well-Appearing] : well-appearing [Normal Voice/Communication] : normal voice/communication [Normal Sclera/Conjunctiva] : normal sclera/conjunctiva [PERRL] : pupils equal round and reactive to light [EOMI] : extraocular movements intact [Normal Outer Ear/Nose] : the outer ears and nose were normal in appearance [Normal Oropharynx] : the oropharynx was normal [No JVD] : no jugular venous distention [Supple] : supple [No Lymphadenopathy] : no lymphadenopathy [Thyroid Normal, No Nodules] : the thyroid was normal and there were no nodules present [No Respiratory Distress] : no respiratory distress  [Clear to Auscultation] : lungs were clear to auscultation bilaterally [No Accessory Muscle Use] : no accessory muscle use [Normal Rate] : normal rate  [Regular Rhythm] : with a regular rhythm [Normal S1, S2] : normal S1 and S2 [No Murmur] : no murmur heard [Soft] : abdomen soft [Non Tender] : non-tender [Non-distended] : non-distended [No Masses] : no abdominal mass palpated [No HSM] : no HSM [Normal Bowel Sounds] : normal bowel sounds [Normal Supraclavicular Nodes] : no supraclavicular lymphadenopathy [Normal Posterior Cervical Nodes] : no posterior cervical lymphadenopathy [Normal Anterior Cervical Nodes] : no anterior cervical lymphadenopathy [Speech Grossly Normal] : speech grossly normal [Normal Affect] : the affect was normal [Normal Mood] : the mood was normal [Normal Insight/Judgement] : insight and judgment were intact

## 2019-01-29 ENCOUNTER — MESSAGE (OUTPATIENT)
Age: 64
End: 2019-01-29

## 2019-01-29 LAB
ALBUMIN SERPL ELPH-MCNC: 4.6 G/DL
ALP BLD-CCNC: 108 U/L
ALT SERPL-CCNC: 24 U/L
ANION GAP SERPL CALC-SCNC: 13 MMOL/L
AST SERPL-CCNC: 27 U/L
BASOPHILS # BLD AUTO: 0.04 K/UL
BASOPHILS NFR BLD AUTO: 0.5 %
BILIRUB SERPL-MCNC: 0.6 MG/DL
BUN SERPL-MCNC: 8 MG/DL
CALCIUM SERPL-MCNC: 9.4 MG/DL
CHLORIDE SERPL-SCNC: 104 MMOL/L
CHOLEST SERPL-MCNC: 124 MG/DL
CHOLEST/HDLC SERPL: 5.2 RATIO
CO2 SERPL-SCNC: 21 MMOL/L
CREAT SERPL-MCNC: 0.62 MG/DL
EOSINOPHIL # BLD AUTO: 0.23 K/UL
EOSINOPHIL NFR BLD AUTO: 3 %
GLUCOSE SERPL-MCNC: 151 MG/DL
HBA1C MFR BLD HPLC: 6.4 %
HCT VFR BLD CALC: 44.6 %
HDLC SERPL-MCNC: 24 MG/DL
HGB BLD-MCNC: 14.7 G/DL
IMM GRANULOCYTES NFR BLD AUTO: 0.1 %
LDLC SERPL CALC-MCNC: 77 MG/DL
LYMPHOCYTES # BLD AUTO: 2.76 K/UL
LYMPHOCYTES NFR BLD AUTO: 35.6 %
MAN DIFF?: NORMAL
MCHC RBC-ENTMCNC: 32.5 PG
MCHC RBC-ENTMCNC: 33 GM/DL
MCV RBC AUTO: 98.7 FL
MONOCYTES # BLD AUTO: 0.53 K/UL
MONOCYTES NFR BLD AUTO: 6.8 %
NEUTROPHILS # BLD AUTO: 4.19 K/UL
NEUTROPHILS NFR BLD AUTO: 54 %
PLATELET # BLD AUTO: 320 K/UL
POTASSIUM SERPL-SCNC: 3.4 MMOL/L
PROT SERPL-MCNC: 8 G/DL
RBC # BLD: 4.52 M/UL
RBC # FLD: 13.9 %
SAVE SPECIMEN: NORMAL
SODIUM SERPL-SCNC: 138 MMOL/L
T3FREE SERPL-MCNC: 2.87 PG/ML
T4 SERPL-MCNC: 5.9 UG/DL
TRIGL SERPL-MCNC: 113 MG/DL
TSH SERPL-ACNC: 1.66 UIU/ML
WBC # FLD AUTO: 7.76 K/UL

## 2019-02-04 ENCOUNTER — MEDICATION RENEWAL (OUTPATIENT)
Age: 64
End: 2019-02-04

## 2019-02-14 ENCOUNTER — MESSAGE (OUTPATIENT)
Age: 64
End: 2019-02-14

## 2019-02-21 ENCOUNTER — APPOINTMENT (OUTPATIENT)
Dept: INTERNAL MEDICINE | Facility: CLINIC | Age: 64
End: 2019-02-21

## 2019-02-22 ENCOUNTER — APPOINTMENT (OUTPATIENT)
Dept: FAMILY MEDICINE | Facility: CLINIC | Age: 64
End: 2019-02-22
Payer: MEDICAID

## 2019-02-22 VITALS
OXYGEN SATURATION: 96 % | WEIGHT: 115 LBS | BODY MASS INDEX: 19.63 KG/M2 | DIASTOLIC BLOOD PRESSURE: 72 MMHG | HEART RATE: 80 BPM | SYSTOLIC BLOOD PRESSURE: 106 MMHG | HEIGHT: 64 IN

## 2019-02-22 DIAGNOSIS — G47.00 INSOMNIA, UNSPECIFIED: ICD-10-CM

## 2019-02-22 PROCEDURE — 99214 OFFICE O/P EST MOD 30 MIN: CPT

## 2019-02-22 RX ORDER — ASPIRIN ENTERIC COATED TABLETS 81 MG 81 MG/1
81 TABLET, DELAYED RELEASE ORAL DAILY
Qty: 90 | Refills: 3 | Status: COMPLETED | COMMUNITY
Start: 2017-12-15 | End: 2019-02-22

## 2019-02-22 RX ORDER — AMLODIPINE BESYLATE 5 MG/1
5 TABLET ORAL DAILY
Qty: 90 | Refills: 3 | Status: COMPLETED | COMMUNITY
Start: 2018-01-08 | End: 2019-02-22

## 2019-02-22 RX ORDER — DICLOFENAC POTASSIUM 50 MG/1
50 TABLET, COATED ORAL TWICE DAILY
Qty: 60 | Refills: 1 | Status: COMPLETED | COMMUNITY
Start: 2018-11-09 | End: 2019-02-22

## 2019-02-22 RX ORDER — HYDROXYZINE HYDROCHLORIDE 25 MG/1
25 TABLET ORAL
Qty: 90 | Refills: 3 | Status: COMPLETED | COMMUNITY
Start: 2017-06-06 | End: 2019-02-22

## 2019-02-22 RX ORDER — ZOLPIDEM TARTRATE 10 MG/1
10 TABLET ORAL
Qty: 30 | Refills: 3 | Status: COMPLETED | COMMUNITY
Start: 2017-07-05 | End: 2019-02-22

## 2019-02-22 NOTE — HISTORY OF PRESENT ILLNESS
[Family Member] : family member [FreeTextEntry1] : to establish care [de-identified] : 62 yo M with hx HTN, GERD, BPH, glaucoma, PVD, chronic pain, insomnia presents to establish care. He was seen previously by Dr. Humberto Maxwell last month. He is due for refills on all of his meds.\par \par HTN-- Pt was prescribed losartan 50mg after BPs 140s/90s. He has been taking losartan 25mg for past few days with /60s.\par \par Insomnia-- he takes  cyclobenzaprine 5mg and hydoxyzine 25mg. He was previously prescribed zolpidem. Taking any of these meds, he still takes 3-4 hours to fall asleep. He spends 6 months in [facility] and 6 months here. He is a permanent resident. Regardless if he is in [facility] or if he is in [place], He can't fall asleep until 5AM and wakes up at 2PM. Taking these meds help. He does report that he has a lot of stress, constantly thinking and worrying. \par \par chronic pain-- Celecoxib helps with his pain the most. He reports he gets pain in his legs after standing for 10 minutes. He feels better after he walks a bit or if he sits down. He does have PVD for which he takes atorvastatin and cilostazol. When he walks, he denies pain. He feels both legs are weak and feels like they will buckle if he stands for too long. \par \par Tobacco use-- he continues to smoke. He has cut down significantly. He used to smoke 1ppd since he was 10. He is now down to 3 cigarettes a day. He is trying to quit.\par \par Cardiac workup-- He denies any symptoms currently but pt reports a normal stress test 2 years ago. \par \par Chronic constipation-- miralax helps him go regularly. He has never had colonoscopy. He has fears for colonoscopy.\par \par Pt had labs done last month with Dr. Humberto Maxwell. Pt will be going back to [facility] in May for 5-6 months.

## 2019-02-22 NOTE — HEALTH RISK ASSESSMENT
[30 or more] : 30 or more [] : No [Two or more falls in past year] : Patient reported two or more falls in the past year [[id number]] : 0 [With Family] : lives with family [Retired] : retired [] :  [Fully functional (bathing, dressing, toileting, transferring, walking, feeding)] : Fully functional (bathing, dressing, toileting, transferring, walking, feeding) [Fully functional (using the telephone, shopping, preparing meals, housekeeping, doing laundry, using] : Fully functional and needs no help or supervision to perform IADLs (using the telephone, shopping, preparing meals, housekeeping, doing laundry, using transportation, managing medications and managing finances)

## 2019-02-22 NOTE — PHYSICAL EXAM
[No Acute Distress] : no acute distress [Well Nourished] : well nourished [Supple] : supple [No Lymphadenopathy] : no lymphadenopathy [Thyroid Normal, No Nodules] : the thyroid was normal and there were no nodules present [No Respiratory Distress] : no respiratory distress  [Clear to Auscultation] : lungs were clear to auscultation bilaterally [Normal Rate] : normal rate  [Regular Rhythm] : with a regular rhythm [Normal S1, S2] : normal S1 and S2 [Normal Gait] : normal gait [Normal Affect] : the affect was normal [Normal Insight/Judgement] : insight and judgment were intact

## 2019-02-27 ENCOUNTER — APPOINTMENT (OUTPATIENT)
Dept: CARDIOLOGY | Facility: CLINIC | Age: 64
End: 2019-02-27

## 2019-03-08 ENCOUNTER — OTHER (OUTPATIENT)
Age: 64
End: 2019-03-08

## 2019-03-08 ENCOUNTER — APPOINTMENT (OUTPATIENT)
Dept: OPHTHALMOLOGY | Facility: CLINIC | Age: 64
End: 2019-03-08

## 2019-03-08 DIAGNOSIS — Z12.83 ENCOUNTER FOR SCREENING FOR MALIGNANT NEOPLASM OF SKIN: ICD-10-CM

## 2019-04-05 ENCOUNTER — APPOINTMENT (OUTPATIENT)
Dept: OPHTHALMOLOGY | Facility: CLINIC | Age: 64
End: 2019-04-05

## 2019-05-02 ENCOUNTER — APPOINTMENT (OUTPATIENT)
Dept: FAMILY MEDICINE | Facility: CLINIC | Age: 64
End: 2019-05-02
Payer: MEDICAID

## 2019-05-02 VITALS
BODY MASS INDEX: 19.29 KG/M2 | HEIGHT: 64 IN | HEART RATE: 79 BPM | DIASTOLIC BLOOD PRESSURE: 70 MMHG | SYSTOLIC BLOOD PRESSURE: 121 MMHG | OXYGEN SATURATION: 94 % | TEMPERATURE: 98.8 F | WEIGHT: 113 LBS

## 2019-05-02 DIAGNOSIS — R79.89 OTHER SPECIFIED ABNORMAL FINDINGS OF BLOOD CHEMISTRY: ICD-10-CM

## 2019-05-02 PROCEDURE — 36415 COLL VENOUS BLD VENIPUNCTURE: CPT

## 2019-05-02 PROCEDURE — 99214 OFFICE O/P EST MOD 30 MIN: CPT | Mod: 25

## 2019-05-02 RX ORDER — BLOOD PRESSURE TEST KIT-MEDIUM
KIT MISCELLANEOUS
Qty: 1 | Refills: 0 | Status: ACTIVE | COMMUNITY
Start: 2019-05-02 | End: 1900-01-01

## 2019-05-02 NOTE — PHYSICAL EXAM
[No Acute Distress] : no acute distress [No Respiratory Distress] : no respiratory distress  [Well Nourished] : well nourished [Normal Rate] : normal rate  [Clear to Auscultation] : lungs were clear to auscultation bilaterally [Regular Rhythm] : with a regular rhythm [Normal S1, S2] : normal S1 and S2 [Normal Affect] : the affect was normal [No Murmur] : no murmur heard [Normal Insight/Judgement] : insight and judgment were intact [de-identified] : tender to palpation right temporal artery. Able to palpate and feel  pulse to top of head where pt feeling headache. No erythema.

## 2019-05-02 NOTE — REVIEW OF SYSTEMS
[Fever] : fever [Vision Problems] : no vision problems [Headache] : headache [Negative] : Heme/Lymph

## 2019-05-02 NOTE — HISTORY OF PRESENT ILLNESS
[FreeTextEntry1] : f/u pre-diabetes, lipid, HTN [de-identified] : 62 yo M presents for f/u for pre-diabetes, lipids, HTN. He also reports that the last 7 days, he has not been feeling well. Every night, he gets pain over his right temple, headache, and fevers. He has not taken his temperature. He denies changes in vision. He does have glaucoma and follows with ophthalmologist. He denies any jaw pain/soreness when he chews. At home, his BPs have been a bit high, 160s/100s. Here, his BPs are normal.\par \par He also needs a few refills.

## 2019-05-06 ENCOUNTER — OTHER (OUTPATIENT)
Age: 64
End: 2019-05-06

## 2019-05-06 LAB
25(OH)D3 SERPL-MCNC: 31.7 NG/ML
ALBUMIN SERPL ELPH-MCNC: 4.4 G/DL
ALP BLD-CCNC: 94 U/L
ALT SERPL-CCNC: 19 U/L
ANION GAP SERPL CALC-SCNC: 13 MMOL/L
AST SERPL-CCNC: 22 U/L
BILIRUB SERPL-MCNC: 0.5 MG/DL
BUN SERPL-MCNC: 11 MG/DL
CALCIUM SERPL-MCNC: 9.1 MG/DL
CHLORIDE SERPL-SCNC: 106 MMOL/L
CO2 SERPL-SCNC: 20 MMOL/L
CREAT SERPL-MCNC: 0.78 MG/DL
CRP SERPL-MCNC: 0.1 MG/DL
ERYTHROCYTE [SEDIMENTATION RATE] IN BLOOD BY WESTERGREN METHOD: 16 MM/HR
ESTIMATED AVERAGE GLUCOSE: 140 MG/DL
GLUCOSE SERPL-MCNC: 115 MG/DL
HBA1C MFR BLD HPLC: 6.5 %
POTASSIUM SERPL-SCNC: 3.5 MMOL/L
PROT SERPL-MCNC: 7.1 G/DL
SODIUM SERPL-SCNC: 139 MMOL/L

## 2019-06-07 ENCOUNTER — APPOINTMENT (OUTPATIENT)
Dept: OPHTHALMOLOGY | Facility: CLINIC | Age: 64
End: 2019-06-07

## 2019-06-07 DIAGNOSIS — M31.6 OTHER GIANT CELL ARTERITIS: ICD-10-CM

## 2019-06-13 ENCOUNTER — RX RENEWAL (OUTPATIENT)
Age: 64
End: 2019-06-13

## 2019-06-16 PROBLEM — I73.9 CLAUDICATION: Status: ACTIVE | Noted: 2017-12-15

## 2019-06-28 ENCOUNTER — APPOINTMENT (OUTPATIENT)
Dept: UROLOGY | Facility: CLINIC | Age: 64
End: 2019-06-28

## 2019-08-02 ENCOUNTER — APPOINTMENT (OUTPATIENT)
Dept: OPHTHALMOLOGY | Facility: CLINIC | Age: 64
End: 2019-08-02

## 2019-08-16 ENCOUNTER — APPOINTMENT (OUTPATIENT)
Dept: UROLOGY | Facility: CLINIC | Age: 64
End: 2019-08-16
Payer: MEDICAID

## 2019-08-16 PROCEDURE — 99213 OFFICE O/P EST LOW 20 MIN: CPT

## 2019-08-16 NOTE — HISTORY OF PRESENT ILLNESS
[FreeTextEntry1] : 64 year old male presents for BPH.\par \par Nocturia every 1-2 hours\par Pt is currently taking oxybutynin and is overhydrated due to medications. \par Increase dose of oxybutynin from 5mg to 10 mg tablet\par Continue Flomax.\par Complete 3-day voiding diary\par RTO in one month

## 2019-08-16 NOTE — ADDENDUM
[FreeTextEntry1] :  I, Jane Escobedo, acted solely as a scribe for Dr. Rolly Medina. The documentation recorded by the scribe accurately reflects the service I personally performed and the decision by me.\par

## 2019-08-16 NOTE — ASSESSMENT
[FreeTextEntry1] : Change dose of oxybutynin from 5mg to 10 mg tablet.\par Three day voiding diary giving to pt. \par RTO in one month

## 2019-09-04 ENCOUNTER — LABORATORY RESULT (OUTPATIENT)
Age: 64
End: 2019-09-04

## 2019-09-04 ENCOUNTER — APPOINTMENT (OUTPATIENT)
Dept: FAMILY MEDICINE | Facility: CLINIC | Age: 64
End: 2019-09-04
Payer: MEDICAID

## 2019-09-04 VITALS
WEIGHT: 109 LBS | OXYGEN SATURATION: 97 % | HEART RATE: 66 BPM | DIASTOLIC BLOOD PRESSURE: 72 MMHG | BODY MASS INDEX: 18.61 KG/M2 | HEIGHT: 64 IN | SYSTOLIC BLOOD PRESSURE: 115 MMHG | TEMPERATURE: 98.7 F

## 2019-09-04 DIAGNOSIS — R21 RASH AND OTHER NONSPECIFIC SKIN ERUPTION: ICD-10-CM

## 2019-09-04 PROCEDURE — 36415 COLL VENOUS BLD VENIPUNCTURE: CPT

## 2019-09-04 PROCEDURE — 99214 OFFICE O/P EST MOD 30 MIN: CPT | Mod: 25

## 2019-09-04 RX ORDER — OXYBUTYNIN CHLORIDE 5 MG/1
5 TABLET, EXTENDED RELEASE ORAL DAILY
Qty: 90 | Refills: 3 | Status: COMPLETED | COMMUNITY
Start: 2017-12-11 | End: 2019-09-04

## 2019-09-04 RX ORDER — CELECOXIB 100 MG/1
100 CAPSULE ORAL TWICE DAILY
Qty: 180 | Refills: 1 | Status: COMPLETED | COMMUNITY
Start: 2018-12-24 | End: 2019-09-04

## 2019-09-04 RX ORDER — OXYBUTYNIN CHLORIDE 5 MG/1
5 TABLET, EXTENDED RELEASE ORAL DAILY
Qty: 90 | Refills: 3 | Status: COMPLETED | COMMUNITY
Start: 2018-12-24 | End: 2019-09-04

## 2019-09-04 RX ORDER — CYCLOBENZAPRINE HYDROCHLORIDE 5 MG/1
5 TABLET, FILM COATED ORAL 3 TIMES DAILY
Qty: 30 | Refills: 1 | Status: COMPLETED | COMMUNITY
Start: 2019-05-02 | End: 2019-09-04

## 2019-09-04 NOTE — HISTORY OF PRESENT ILLNESS
[de-identified] : 63 yo M with arthritis, muscular weakness, PVD, HTN, presents for med refills, form completion for disability and persistent headaches.\par \par Headaches-- Pt has been experiencing chills, subjective fevers every night with headache on top of his head. Putting cold water on his head helps. Headache is right sided involving half his face. After he goes to sleep, headache is gone. He experiences this daily and has same pattern. Headaches come on only at night. There was concern for GCA but he tested negative. He also saw opthal as well. He was given referral to see neurologist but has not made appt yet. \par \par Muscular weakness, low back pain with sciatica-- he has been going to PT twice a week. He finds it useful but finds it still very painful and weak. He can walk/stand for 5-7 minutes pain free. After that, he needs to rest. He continues to smoke and is not interested in quitting despite discussing harms of smoking. [FreeTextEntry1] : med refills, form completion, persistent headaches

## 2019-09-04 NOTE — PHYSICAL EXAM
[de-identified] : antalgic gait, slight shuffle [Normal] : affect was normal and insight and judgment were intact

## 2019-09-04 NOTE — REVIEW OF SYSTEMS
[Fever] : fever [Fatigue] : fatigue [Chills] : chills [Back Pain] : back pain [Muscle Weakness] : muscle weakness [Headache] : headache [Unsteady Walk] : ataxia [Dizziness] : no dizziness [Negative] : Heme/Lymph

## 2019-09-05 RX ORDER — NEOMYCIN AND POLYMYXIN B SULFATES AND DEXAMETHASONE 3.5; 10000; 1 MG/G; [IU]/G; MG/G
3.5-10000-0.1 OINTMENT OPHTHALMIC
Qty: 1 | Refills: 1 | Status: COMPLETED | COMMUNITY
Start: 2018-11-19 | End: 2019-09-05

## 2019-09-09 ENCOUNTER — RX RENEWAL (OUTPATIENT)
Age: 64
End: 2019-09-09

## 2019-09-09 LAB
ALBUMIN SERPL ELPH-MCNC: 4.4 G/DL
ALP BLD-CCNC: 88 U/L
ALT SERPL-CCNC: 23 U/L
ANION GAP SERPL CALC-SCNC: 12 MMOL/L
AST SERPL-CCNC: 25 U/L
BASOPHILS # BLD AUTO: 0.06 K/UL
BASOPHILS NFR BLD AUTO: 0.7 %
BILIRUB SERPL-MCNC: 0.4 MG/DL
BUN SERPL-MCNC: 12 MG/DL
CALCIUM SERPL-MCNC: 9.2 MG/DL
CHLORIDE SERPL-SCNC: 105 MMOL/L
CHOLEST SERPL-MCNC: 124 MG/DL
CHOLEST/HDLC SERPL: 5 RATIO
CO2 SERPL-SCNC: 22 MMOL/L
CREAT SERPL-MCNC: 0.95 MG/DL
EOSINOPHIL # BLD AUTO: 0.38 K/UL
EOSINOPHIL NFR BLD AUTO: 4.5 %
GLUCOSE SERPL-MCNC: 89 MG/DL
HCT VFR BLD CALC: 40.6 %
HDLC SERPL-MCNC: 25 MG/DL
HGB BLD-MCNC: 13.3 G/DL
IMM GRANULOCYTES NFR BLD AUTO: 0.2 %
LDLC SERPL CALC-MCNC: 66 MG/DL
LYMPHOCYTES # BLD AUTO: 2.95 K/UL
LYMPHOCYTES NFR BLD AUTO: 34.6 %
MAN DIFF?: NORMAL
MCHC RBC-ENTMCNC: 32.8 GM/DL
MCHC RBC-ENTMCNC: 34.2 PG
MCV RBC AUTO: 104.4 FL
MONOCYTES # BLD AUTO: 0.69 K/UL
MONOCYTES NFR BLD AUTO: 8.1 %
NEUTROPHILS # BLD AUTO: 4.42 K/UL
NEUTROPHILS NFR BLD AUTO: 51.9 %
PLATELET # BLD AUTO: 239 K/UL
POTASSIUM SERPL-SCNC: 4.4 MMOL/L
PROT SERPL-MCNC: 7.3 G/DL
RBC # BLD: 3.89 M/UL
RBC # FLD: 13.6 %
SODIUM SERPL-SCNC: 139 MMOL/L
TRIGL SERPL-MCNC: 163 MG/DL
WBC # FLD AUTO: 8.52 K/UL

## 2019-09-13 ENCOUNTER — RX RENEWAL (OUTPATIENT)
Age: 64
End: 2019-09-13

## 2019-09-16 ENCOUNTER — RX RENEWAL (OUTPATIENT)
Age: 64
End: 2019-09-16

## 2019-09-27 ENCOUNTER — RX RENEWAL (OUTPATIENT)
Age: 64
End: 2019-09-27

## 2019-09-27 ENCOUNTER — APPOINTMENT (OUTPATIENT)
Dept: OPHTHALMOLOGY | Facility: CLINIC | Age: 64
End: 2019-09-27

## 2019-09-27 RX ORDER — POLYETHYLENE GLYCOL 3350 17 G/17G
17 POWDER, FOR SOLUTION ORAL
Qty: 1 | Refills: 3 | Status: COMPLETED | COMMUNITY
Start: 2017-05-26 | End: 2019-09-27

## 2019-10-02 ENCOUNTER — APPOINTMENT (OUTPATIENT)
Dept: UROLOGY | Facility: CLINIC | Age: 64
End: 2019-10-02
Payer: MEDICAID

## 2019-10-02 DIAGNOSIS — N40.1 BENIGN PROSTATIC HYPERPLASIA WITH LOWER URINARY TRACT SYMPMS: ICD-10-CM

## 2019-10-02 DIAGNOSIS — N13.8 BENIGN PROSTATIC HYPERPLASIA WITH LOWER URINARY TRACT SYMPMS: ICD-10-CM

## 2019-10-02 PROCEDURE — 99213 OFFICE O/P EST LOW 20 MIN: CPT

## 2019-10-02 NOTE — ADDENDUM
[FreeTextEntry1] :  I, Nereida Fishman, acted solely as a scribe for Dr. Rolly Medina. The documentation recorded by the scribe accurately reflects the service I personally performed and the decision by me.\par

## 2019-10-02 NOTE — ASSESSMENT
[FreeTextEntry1] : Recommended pt to reduce fluid intake by half so he can urinate less and get rid of his frequent and the side effects from his medication. \par Advised the pt to stop Oxybutynin and take Tamsulosin in order to void more adequately.  \par Gave pt a new 3 day voiding diary\par RTO in 2 weeks for Uroflow

## 2019-10-02 NOTE — PHYSICAL EXAM
[General Appearance - Well Developed] : well developed [General Appearance - Well Nourished] : well nourished [Normal Appearance] : normal appearance [Well Groomed] : well groomed [General Appearance - In No Acute Distress] : no acute distress [Abdomen Soft] : soft [Costovertebral Angle Tenderness] : no ~M costovertebral angle tenderness [Abdomen Tenderness] : non-tender [Urethral Meatus] : meatus normal [Scrotum] : the scrotum was normal [Urinary Bladder Findings] : the bladder was normal on palpation [Testes Mass (___cm)] : there were no testicular masses [No Prostate Nodules] : no prostate nodules [Edema] : no peripheral edema [] : no respiratory distress [Respiration, Rhythm And Depth] : normal respiratory rhythm and effort [Exaggerated Use Of Accessory Muscles For Inspiration] : no accessory muscle use [Oriented To Time, Place, And Person] : oriented to person, place, and time [Affect] : the affect was normal [Mood] : the mood was normal [Not Anxious] : not anxious [Normal Station and Gait] : the gait and station were normal for the patient's age [No Focal Deficits] : no focal deficits [No Palpable Adenopathy] : no palpable adenopathy

## 2019-10-02 NOTE — HISTORY OF PRESENT ILLNESS
[FreeTextEntry1] : 64 year old male presents for BPH.\par Pt's voiding diary shows voiding of 3000-4000cc daily. \par Pt is currently taking Tylenol \par \par Recommended pt to reduce fluid intake by half so he can urinate less and get rid of his frequent and the side effects from his medication. \par Advised the pt to stop Oxybutynin and take Tamsulosin in order to void more adequately.  \par Gave pt a new 3 day voiding diary\par RTO in 2 weeks for Uroflow\par

## 2019-10-14 ENCOUNTER — APPOINTMENT (OUTPATIENT)
Dept: UROLOGY | Facility: CLINIC | Age: 64
End: 2019-10-14
Payer: MEDICAID

## 2019-10-14 PROCEDURE — 99213 OFFICE O/P EST LOW 20 MIN: CPT

## 2019-10-14 NOTE — HISTORY OF PRESENT ILLNESS
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 Pt is against taking Tamsulosin \par Pt c/o of pain in his bladder if he doesn't drink. \par Pt has trouble getting to sleep and takes sleeping tablets. \par \par Pt's 3 day voiding daily shows 2000cc of output each day. \par Advised pt not to drink after 9pm at night. \par Restarted Oxybutynin \par Repeat voiding diary in a month and RTO in 2 months. \par

## 2019-10-14 NOTE — PHYSICAL EXAM
[General Appearance - Well Nourished] : well nourished [General Appearance - Well Developed] : well developed [Normal Appearance] : normal appearance [General Appearance - In No Acute Distress] : no acute distress [Well Groomed] : well groomed [Abdomen Soft] : soft [Abdomen Tenderness] : non-tender [Costovertebral Angle Tenderness] : no ~M costovertebral angle tenderness [Urethral Meatus] : meatus normal [Urinary Bladder Findings] : the bladder was normal on palpation [Testes Mass (___cm)] : there were no testicular masses [Scrotum] : the scrotum was normal [No Prostate Nodules] : no prostate nodules [Edema] : no peripheral edema [] : no respiratory distress [Respiration, Rhythm And Depth] : normal respiratory rhythm and effort [Exaggerated Use Of Accessory Muscles For Inspiration] : no accessory muscle use [Oriented To Time, Place, And Person] : oriented to person, place, and time [Affect] : the affect was normal [Mood] : the mood was normal [Not Anxious] : not anxious [Normal Station and Gait] : the gait and station were normal for the patient's age [No Focal Deficits] : no focal deficits [No Palpable Adenopathy] : no palpable adenopathy

## 2019-10-14 NOTE — ASSESSMENT
[FreeTextEntry1] : Pt's 3 day voiding daily shows 2000cc of output each day. \par Advised pt not to drink after 9pm at night. \par Restarted Oxybutynin \par Repeat voiding diary in a month and RTO in 2 months. \par

## 2019-10-25 ENCOUNTER — APPOINTMENT (OUTPATIENT)
Dept: OPHTHALMOLOGY | Facility: CLINIC | Age: 64
End: 2019-10-25

## 2019-11-20 ENCOUNTER — APPOINTMENT (OUTPATIENT)
Dept: UROLOGY | Facility: CLINIC | Age: 64
End: 2019-11-20

## 2019-12-23 ENCOUNTER — APPOINTMENT (OUTPATIENT)
Dept: UROLOGY | Facility: CLINIC | Age: 64
End: 2019-12-23
Payer: MEDICAID

## 2019-12-23 DIAGNOSIS — N32.81 OVERACTIVE BLADDER: ICD-10-CM

## 2019-12-23 DIAGNOSIS — R35.0 FREQUENCY OF MICTURITION: ICD-10-CM

## 2019-12-23 PROCEDURE — 99213 OFFICE O/P EST LOW 20 MIN: CPT

## 2019-12-23 NOTE — HISTORY OF PRESENT ILLNESS
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 \par Pt c/o of pain in his bladder if he doesn't drink. Pt refuses to cut down on his fluid intake and doesn't mind his symptoms. \par Pt has trouble getting to sleep and takes sleeping tablets. \par Pt has resumed Oxybutynin \par 3 day voiding diary shows excessive intake (72, 72, 84 output and 75, 73, 86 intake).\par \par Renewed Tamsulosin and Oxybutynin on pt's request. \par \par \par \par

## 2020-01-10 ENCOUNTER — APPOINTMENT (OUTPATIENT)
Dept: OPHTHALMOLOGY | Facility: CLINIC | Age: 65
End: 2020-01-10

## 2020-03-17 ENCOUNTER — APPOINTMENT (OUTPATIENT)
Dept: FAMILY MEDICINE | Facility: CLINIC | Age: 65
End: 2020-03-17

## 2020-03-17 RX ORDER — TAMSULOSIN HYDROCHLORIDE 0.4 MG/1
0.4 CAPSULE ORAL
Qty: 90 | Refills: 3 | Status: COMPLETED | COMMUNITY
Start: 2019-10-02 | End: 2020-03-17

## 2020-03-17 RX ORDER — LANSOPRAZOLE 100 %
POWDER (GRAM) MISCELLANEOUS
Qty: 1 | Refills: 0 | Status: COMPLETED | COMMUNITY
Start: 2019-01-25 | End: 2020-03-17

## 2020-03-17 RX ORDER — LATANOPROST/PF 0.005 %
0.01 DROPS OPHTHALMIC (EYE) DAILY
Qty: 1 | Refills: 5 | Status: COMPLETED | COMMUNITY
Start: 2017-07-11 | End: 2020-03-17

## 2020-03-17 RX ORDER — TAMSULOSIN HYDROCHLORIDE 0.4 MG/1
0.4 CAPSULE ORAL
Qty: 90 | Refills: 3 | Status: COMPLETED | COMMUNITY
Start: 2018-12-24 | End: 2020-03-17

## 2020-03-17 RX ORDER — DORZOLAMIDE HYDROCHLORIDE 20 MG/ML
2 SOLUTION OPHTHALMIC
Qty: 3 | Refills: 0 | Status: COMPLETED | COMMUNITY
Start: 2017-11-24 | End: 2020-03-17

## 2020-03-17 RX ORDER — DORZOLAMIDE HYDROCHLORIDE TIMOLOL MALEATE 20; 5 MG/ML; MG/ML
22.3-6.8 SOLUTION/ DROPS OPHTHALMIC 3 TIMES DAILY
Qty: 1 | Refills: 5 | Status: COMPLETED | COMMUNITY
Start: 2018-11-16 | End: 2020-03-17

## 2020-03-17 RX ORDER — OXYBUTYNIN CHLORIDE 5 MG/1
5 TABLET ORAL
Qty: 180 | Refills: 3 | Status: COMPLETED | COMMUNITY
Start: 2020-02-25 | End: 2020-03-17

## 2020-03-17 RX ORDER — OXYBUTYNIN CHLORIDE 10 MG/1
10 TABLET, EXTENDED RELEASE ORAL
Qty: 90 | Refills: 2 | Status: COMPLETED | COMMUNITY
Start: 2019-10-14 | End: 2020-03-17

## 2020-03-17 RX ORDER — DORZOLAMIDE HYDROCHLORIDE TIMOLOL MALEATE 20; 5 MG/ML; MG/ML
22.3-6.8 SOLUTION/ DROPS OPHTHALMIC 3 TIMES DAILY
Qty: 1 | Refills: 5 | Status: COMPLETED | COMMUNITY
Start: 2017-09-08 | End: 2020-03-17

## 2020-04-13 ENCOUNTER — RX RENEWAL (OUTPATIENT)
Age: 65
End: 2020-04-13

## 2020-05-19 ENCOUNTER — APPOINTMENT (OUTPATIENT)
Dept: FAMILY MEDICINE | Facility: CLINIC | Age: 65
End: 2020-05-19
Payer: MEDICAID

## 2020-05-19 DIAGNOSIS — H66.91 OTITIS MEDIA, UNSPECIFIED, RIGHT EAR: ICD-10-CM

## 2020-05-19 PROCEDURE — 99441: CPT

## 2020-05-19 RX ORDER — AMOXICILLIN AND CLAVULANATE POTASSIUM 875; 125 MG/1; MG/1
875-125 TABLET, COATED ORAL
Qty: 20 | Refills: 0 | Status: COMPLETED | COMMUNITY
Start: 2020-05-19 | End: 2020-05-29

## 2020-06-10 ENCOUNTER — APPOINTMENT (OUTPATIENT)
Dept: FAMILY MEDICINE | Facility: CLINIC | Age: 65
End: 2020-06-10
Payer: MEDICAID

## 2020-06-10 PROCEDURE — 99441: CPT

## 2020-07-10 ENCOUNTER — APPOINTMENT (OUTPATIENT)
Dept: OPHTHALMOLOGY | Facility: CLINIC | Age: 65
End: 2020-07-10

## 2020-08-10 ENCOUNTER — APPOINTMENT (OUTPATIENT)
Dept: FAMILY MEDICINE | Facility: CLINIC | Age: 65
End: 2020-08-10
Payer: MEDICAID

## 2020-08-10 VITALS
SYSTOLIC BLOOD PRESSURE: 154 MMHG | BODY MASS INDEX: 18.44 KG/M2 | DIASTOLIC BLOOD PRESSURE: 88 MMHG | TEMPERATURE: 98.2 F | HEIGHT: 64 IN | WEIGHT: 108.02 LBS | HEART RATE: 71 BPM | OXYGEN SATURATION: 94 %

## 2020-08-10 VITALS — DIASTOLIC BLOOD PRESSURE: 90 MMHG | SYSTOLIC BLOOD PRESSURE: 140 MMHG

## 2020-08-10 DIAGNOSIS — H66.93 OTITIS MEDIA, UNSPECIFIED, BILATERAL: ICD-10-CM

## 2020-08-10 DIAGNOSIS — Z11.59 ENCOUNTER FOR SCREENING FOR OTHER VIRAL DISEASES: ICD-10-CM

## 2020-08-10 PROCEDURE — 99214 OFFICE O/P EST MOD 30 MIN: CPT | Mod: 25

## 2020-08-10 PROCEDURE — 36415 COLL VENOUS BLD VENIPUNCTURE: CPT

## 2020-08-10 RX ORDER — LEVOFLOXACIN 500 MG/1
500 TABLET, FILM COATED ORAL DAILY
Qty: 10 | Refills: 0 | Status: COMPLETED | COMMUNITY
Start: 2020-08-10 | End: 2020-08-20

## 2020-08-10 NOTE — PHYSICAL EXAM
[Normal] : normal rate, regular rhythm, normal S1 and S2 and no murmur heard [Normal Affect] : the affect was normal [de-identified] : b/l bulging TM, white opacities. Left ear-- small white round hairy patch left side of TM with central black spots?. Tender maxillary sinuses.

## 2020-08-10 NOTE — HISTORY OF PRESENT ILLNESS
[Family Member] : family member [FreeTextEntry8] : cc-- b/l ear pain, headache since May. \par \par Pt had telehealth appt on May 19 due to COVID for similar issue-- right sided ear pain, muffled sounds. He was given a 10 day course of augmentin. He was advised to f/u if no improvement. Pt had a telephone encounter on Lisbet 10 saying, he is still having ear pain. He also reports having fever daily along with headache. Advised he needs to come in person as I could not triage over telephone, I would have to examine him. Pt refused due to COVID concerns. \par \par Pt now comes in today, saying now both ears are very painful with headache. He is also having decreased hearing b/l. Pt states taking extra losartan tablet helped with the headache but still had some pressure. Pt did not take BP although he does have machine at home. \par \par He reports he also is having issues with his sinus, he cannot smell and taste anything. He is also congested for past 2 weeks. He has not been out or around other people.

## 2020-08-11 LAB
SARS-COV-2 IGG SERPL IA-ACNC: 0.08 INDEX
SARS-COV-2 IGG SERPL QL IA: NEGATIVE

## 2020-08-24 ENCOUNTER — APPOINTMENT (OUTPATIENT)
Dept: UROLOGY | Facility: CLINIC | Age: 65
End: 2020-08-24
Payer: MEDICAID

## 2020-08-24 PROCEDURE — 99442: CPT

## 2020-08-25 DIAGNOSIS — B36.9 SUPERFICIAL MYCOSIS, UNSPECIFIED: ICD-10-CM

## 2020-08-25 DIAGNOSIS — H62.42 SUPERFICIAL MYCOSIS, UNSPECIFIED: ICD-10-CM

## 2020-08-31 ENCOUNTER — APPOINTMENT (OUTPATIENT)
Dept: OTOLARYNGOLOGY | Facility: CLINIC | Age: 65
End: 2020-08-31
Payer: MEDICAID

## 2020-08-31 ENCOUNTER — OUTPATIENT (OUTPATIENT)
Dept: OUTPATIENT SERVICES | Facility: HOSPITAL | Age: 65
LOS: 1 days | Discharge: HOME | End: 2020-08-31

## 2020-08-31 VITALS
SYSTOLIC BLOOD PRESSURE: 156 MMHG | BODY MASS INDEX: 18.78 KG/M2 | DIASTOLIC BLOOD PRESSURE: 88 MMHG | WEIGHT: 110 LBS | HEIGHT: 64 IN | HEART RATE: 53 BPM

## 2020-08-31 DIAGNOSIS — H93.13 TINNITUS, BILATERAL: ICD-10-CM

## 2020-08-31 DIAGNOSIS — R51 HEADACHE: ICD-10-CM

## 2020-08-31 DIAGNOSIS — H92.03 OTALGIA, BILATERAL: ICD-10-CM

## 2020-08-31 DIAGNOSIS — Z98.890 OTHER SPECIFIED POSTPROCEDURAL STATES: Chronic | ICD-10-CM

## 2020-08-31 DIAGNOSIS — Z98.41 CATARACT EXTRACTION STATUS, RIGHT EYE: Chronic | ICD-10-CM

## 2020-08-31 DIAGNOSIS — M26.609 UNSPECIFIED TEMPOROMANDIBULAR JOINT DISORDER: ICD-10-CM

## 2020-08-31 PROCEDURE — 92557 COMPREHENSIVE HEARING TEST: CPT

## 2020-08-31 PROCEDURE — 92567 TYMPANOMETRY: CPT

## 2020-08-31 PROCEDURE — 31575 DIAGNOSTIC LARYNGOSCOPY: CPT

## 2020-08-31 RX ORDER — POTASSIUM CHLORIDE 750 MG/1
10 CAPSULE, EXTENDED RELEASE ORAL
Qty: 7 | Refills: 0 | Status: DISCONTINUED | COMMUNITY
Start: 2019-01-29 | End: 2020-08-31

## 2020-08-31 RX ORDER — MULTIVITAMIN WITH FOLIC ACID 400 MCG
TABLET ORAL
Qty: 100 | Refills: 3 | Status: DISCONTINUED | COMMUNITY
Start: 2017-10-02 | End: 2020-08-31

## 2020-08-31 RX ORDER — CILOSTAZOL 50 MG/1
50 TABLET ORAL
Qty: 180 | Refills: 3 | Status: DISCONTINUED | COMMUNITY
Start: 2018-08-03 | End: 2020-08-31

## 2020-08-31 RX ORDER — POLYETHYLENE GLYCOL 3350 17 G/17G
17 POWDER, FOR SOLUTION ORAL DAILY
Qty: 1 | Refills: 1 | Status: DISCONTINUED | COMMUNITY
Start: 2019-02-22 | End: 2020-08-31

## 2020-08-31 RX ORDER — BRIMONIDINE TARTRATE 2 MG/MG
0.2 SOLUTION/ DROPS OPHTHALMIC
Qty: 1 | Refills: 5 | Status: DISCONTINUED | COMMUNITY
Start: 2017-10-10 | End: 2020-08-31

## 2020-08-31 RX ORDER — POLYETHYLENE GLYCOL 3350 17 G/17G
17 POWDER, FOR SOLUTION ORAL
Refills: 0 | Status: ACTIVE | COMMUNITY

## 2020-08-31 RX ORDER — ASPIRIN ENTERIC COATED TABLETS 81 MG 81 MG/1
81 TABLET, DELAYED RELEASE ORAL DAILY
Qty: 90 | Refills: 3 | Status: DISCONTINUED | COMMUNITY
Start: 2019-05-02 | End: 2020-08-31

## 2020-08-31 RX ORDER — CLOTRIMAZOLE 10 MG/ML
1 SOLUTION TOPICAL
Qty: 1 | Refills: 0 | Status: DISCONTINUED | COMMUNITY
Start: 2020-08-25 | End: 2020-08-31

## 2020-08-31 RX ORDER — MELOXICAM 7.5 MG/1
7.5 TABLET ORAL DAILY
Qty: 60 | Refills: 3 | Status: DISCONTINUED | COMMUNITY
Start: 2019-09-27 | End: 2020-08-31

## 2020-08-31 RX ORDER — CIPROFLOXACIN AND DEXAMETHASONE 3; 1 MG/ML; MG/ML
0.3-0.1 SUSPENSION/ DROPS AURICULAR (OTIC) TWICE DAILY
Qty: 1 | Refills: 0 | Status: DISCONTINUED | COMMUNITY
Start: 2020-08-10 | End: 2020-08-31

## 2020-08-31 RX ORDER — OXYBUTYNIN CHLORIDE 10 MG/1
10 TABLET, EXTENDED RELEASE ORAL
Qty: 90 | Refills: 2 | Status: DISCONTINUED | COMMUNITY
Start: 2019-08-16 | End: 2020-08-31

## 2020-08-31 RX ORDER — HYDROCORTISONE 10 MG/G
1 CREAM TOPICAL
Qty: 1 | Refills: 0 | Status: DISCONTINUED | COMMUNITY
Start: 2019-09-04 | End: 2020-08-31

## 2020-08-31 RX ORDER — DORZOLAMIDE HYDROCHLORIDE TIMOLOL MALEATE 20; 5 MG/ML; MG/ML
22.3-6.8 SOLUTION/ DROPS OPHTHALMIC TWICE DAILY
Qty: 1 | Refills: 3 | Status: DISCONTINUED | COMMUNITY
Start: 2019-07-24 | End: 2020-08-31

## 2020-08-31 RX ORDER — ASPIRIN 81 MG
81 TABLET, DELAYED RELEASE (ENTERIC COATED) ORAL
Refills: 0 | Status: ACTIVE | COMMUNITY

## 2020-08-31 RX ORDER — OXYBUTYNIN CHLORIDE 2.5 MG/1
TABLET ORAL
Refills: 0 | Status: ACTIVE | COMMUNITY

## 2020-08-31 RX ORDER — LATANOPROST/PF 0.005 %
0.01 DROPS OPHTHALMIC (EYE)
Qty: 1 | Refills: 5 | Status: DISCONTINUED | COMMUNITY
Start: 2018-11-16 | End: 2020-08-31

## 2020-08-31 NOTE — PHYSICAL EXAM
[Laryngoscopy Performed] : laryngoscopy was performed, see procedure section for findings [Normal] : palpation of lymph nodes is normal [de-identified] : no palpable cervical LAD [de-identified] : tenderness of right TMJ [de-identified] : partial cerumen impaction bilaterally [de-identified] : poor dentition [de-identified] : unhealthy appearing gums

## 2020-08-31 NOTE — HISTORY OF PRESENT ILLNESS
[de-identified] : 65 year old male referred by Dr. Lora Maxwell, PCP, for  bilateral otalgia, bilateral hearing loss, bilateral tinnitus.  States problems with the ears for the past 3-4 months.  States right otalgia worse than the left, radiates down from temporal area to below the ear.  Denies otorrhea.  States PCP prescribed Ciprodex ear drops for 7 days, used as prescribed.  States the ear drops helped but did not resolve. \par \par Pain is severe, every day, all day. Feels like he has swelling beneath the ear, mostly the right side. Reports decreased hearing on the right side for years, but now recently has hearing loss on both sides. Sometimes has vertigo and headaches. Tinnitus is bilateral, but worse on left. No difficulty with swallowing. Reports hyposmia for 10 years, reports history of sinusitis. No difficulty breathing. No voice changes. Reports pressure and pain over his forehead and cheeks. No nasal congestion/obstruction or rhinorrhea. No cough.\par \par Tobacco use for 55+ years. No alcohol use. No allergies. Pt reports subjective weight loss, but per chart patient has not lost weight in the last year.

## 2020-08-31 NOTE — CONSULT LETTER
[Dear  ___] : Dear  [unfilled], [Consult Letter:] : I had the pleasure of evaluating your patient, [unfilled]. [Please see my note below.] : Please see my note below. [Consult Closing:] : Thank you very much for allowing me to participate in the care of this patient.  If you have any questions, please do not hesitate to contact me. [Sincerely,] : Sincerely, [FreeTextEntry3] : Rajendra Plaza MD, FACS \par  of Otolaryngology  \par [facility] \par [address] \[place] [address] \par Phone: [phone] \par Fax: [phone] \par \par

## 2020-08-31 NOTE — DATA REVIEWED
[de-identified] : Type A tymps\par Normal hearing sloping to moderate SNHL in higher frequencies, symmetric

## 2020-08-31 NOTE — REVIEW OF SYSTEMS
[Ear Itch] : ear itch [Ear Pain] : ear pain [Hearing Loss] : hearing loss [Vertigo] : vertigo [Dizziness] : dizziness [Recurrent Ear Infections] : recurrent ear infections [Ear Noises] : ear noises [Ear Drainage] : ear drainage [Recurrent Sinus Infections] : recurrent sinus infections [Sinus Pain] : sinus pain [Sinus Pressure] : sinus pressure [Sense Of Smell Problem] : sense of smell problem [Eye Pain] : eye pain [Eyes Itch] : itching of the eyes [Cough] : cough [Negative] : Heme/Lymph [de-identified] : noisy breathing [FreeTextEntry1] : daytime sleepiness

## 2020-08-31 NOTE — REASON FOR VISIT
[Initial Consultation] : an initial consultation for [Family Member] : family member [FreeTextEntry2] : referred by Dr. Lora Maxwell, PCP, for  bilateral otalgia, bilateral hearing loss, bilateral tinnitus

## 2020-09-15 DIAGNOSIS — M26.609 UNSPECIFIED TEMPOROMANDIBULAR JOINT DISORDER, UNSPECIFIED SIDE: ICD-10-CM

## 2020-09-15 DIAGNOSIS — R51 HEADACHE: ICD-10-CM

## 2020-09-15 DIAGNOSIS — H92.03 OTALGIA, BILATERAL: ICD-10-CM

## 2020-09-15 DIAGNOSIS — K21.9 GASTRO-ESOPHAGEAL REFLUX DISEASE WITHOUT ESOPHAGITIS: ICD-10-CM

## 2020-09-15 DIAGNOSIS — H93.13 TINNITUS, BILATERAL: ICD-10-CM

## 2020-10-14 RX ORDER — CIPROFLOXACIN AND DEXAMETHASONE 3; 1 MG/ML; MG/ML
SUSPENSION/ DROPS AURICULAR (OTIC)
Refills: 0 | Status: COMPLETED | COMMUNITY
End: 2020-10-14

## 2020-10-21 ENCOUNTER — APPOINTMENT (OUTPATIENT)
Dept: FAMILY MEDICINE | Facility: CLINIC | Age: 65
End: 2020-10-21
Payer: MEDICAID

## 2020-10-21 VITALS
HEART RATE: 66 BPM | SYSTOLIC BLOOD PRESSURE: 167 MMHG | OXYGEN SATURATION: 99 % | BODY MASS INDEX: 18.61 KG/M2 | TEMPERATURE: 99.1 F | HEIGHT: 64 IN | WEIGHT: 109 LBS | DIASTOLIC BLOOD PRESSURE: 100 MMHG

## 2020-10-21 VITALS — SYSTOLIC BLOOD PRESSURE: 140 MMHG | DIASTOLIC BLOOD PRESSURE: 82 MMHG

## 2020-10-21 DIAGNOSIS — M26.609 UNSPECIFIED TEMPOROMANDIBULAR JOINT DISORDER: ICD-10-CM

## 2020-10-21 PROCEDURE — 99213 OFFICE O/P EST LOW 20 MIN: CPT | Mod: 25

## 2020-10-21 NOTE — HISTORY OF PRESENT ILLNESS
[FreeTextEntry8] : cc-- ear pain, b/l headaches\par \par 64 yo M, continues to have ear pain. He had otitis media previously, was treated. He went to ENT, ears normal but he continues to have pain. He is using OTC earache drops, does not help. He reports using ciprodex helps. No hearing change. Has poor dentition, only eats soft things, does not notice pain with eating.\par \par b/l headaches-- ongoing for past year. Initially, was concerned for temporal arteritis. Had f/u with ophthalmology, all normal. He continues to have pain half of his scalp. History difficult to obtain even with son translating. \par \par  [Family Member] : family member

## 2020-10-21 NOTE — PHYSICAL EXAM
[Normal TMs] : both tympanic membranes were normal [Normal Gait] : normal gait [Normal] : affect was normal and insight and judgment were intact [de-identified] : + TMJ tenderness on right side.

## 2020-11-11 ENCOUNTER — APPOINTMENT (OUTPATIENT)
Dept: OTOLARYNGOLOGY | Facility: CLINIC | Age: 65
End: 2020-11-11

## 2020-12-18 ENCOUNTER — APPOINTMENT (OUTPATIENT)
Dept: OPHTHALMOLOGY | Facility: CLINIC | Age: 65
End: 2020-12-18

## 2020-12-23 PROBLEM — H66.93 ACUTE OTITIS MEDIA, BILATERAL: Status: RESOLVED | Noted: 2020-08-10 | Resolved: 2020-12-23

## 2020-12-23 PROBLEM — H66.91 RIGHT OTITIS MEDIA: Status: RESOLVED | Noted: 2020-05-19 | Resolved: 2020-12-23

## 2021-02-16 ENCOUNTER — OUTPATIENT (OUTPATIENT)
Dept: OUTPATIENT SERVICES | Facility: HOSPITAL | Age: 66
LOS: 1 days | End: 2021-02-16
Payer: MEDICAID

## 2021-02-16 ENCOUNTER — APPOINTMENT (OUTPATIENT)
Dept: NEUROLOGY | Facility: HOSPITAL | Age: 66
End: 2021-02-16

## 2021-02-16 VITALS
TEMPERATURE: 97.5 F | WEIGHT: 114 LBS | HEIGHT: 64 IN | SYSTOLIC BLOOD PRESSURE: 142 MMHG | BODY MASS INDEX: 19.46 KG/M2 | DIASTOLIC BLOOD PRESSURE: 90 MMHG | RESPIRATION RATE: 14 BRPM | HEART RATE: 81 BPM

## 2021-02-16 DIAGNOSIS — Z98.41 CATARACT EXTRACTION STATUS, RIGHT EYE: Chronic | ICD-10-CM

## 2021-02-16 DIAGNOSIS — R51.9 HEADACHE, UNSPECIFIED: ICD-10-CM

## 2021-02-16 DIAGNOSIS — M54.16 RADICULOPATHY, LUMBAR REGION: ICD-10-CM

## 2021-02-16 DIAGNOSIS — Z98.890 OTHER SPECIFIED POSTPROCEDURAL STATES: Chronic | ICD-10-CM

## 2021-02-16 PROCEDURE — G0463: CPT

## 2021-02-16 NOTE — PHYSICAL EXAM
[Oriented To Time, Place, And Person] : oriented to person, place, and time [Affect] : the affect was normal [Memory Recent] : recent memory was not impaired [Cranial Nerves Optic (II)] : visual acuity intact bilaterally,  visual fields full to confrontation, pupils equal round and reactive to light [Cranial Nerves Oculomotor (III)] : extraocular motion intact [Cranial Nerves Trigeminal (V)] : facial sensation intact symmetrically [Cranial Nerves Facial (VII)] : face symmetrical [Cranial Nerves Vestibulocochlear (VIII)] : hearing was intact bilaterally [Cranial Nerves Glossopharyngeal (IX)] : tongue and palate midline [Cranial Nerves Accessory (XI - Cranial And Spinal)] : head turning and shoulder shrug symmetric [Cranial Nerves Hypoglossal (XII)] : there was no tongue deviation with protrusion [Motor Tone] : muscle tone was normal in all four extremities [Motor Strength] : muscle strength was normal in all four extremities [Involuntary Movements] : no involuntary movements were seen [Sensation Tactile Decrease] : light touch was intact [Abnormal Walk] : normal gait [Balance] : balance was intact [0] : Patella left 0 [1+] : Ankle jerk left 1+ [Coordination - Dysmetria Impaired Finger-to-Nose Bilateral] : not present [Coordination - Dysmetria Impaired Heel-to-Shin Bilateral] : not present [Plantar Reflex Right Only] : normal on the right [Plantar Reflex Left Only] : normal on the left [___] : absent on the right [___] : absent on the left

## 2021-02-16 NOTE — HISTORY OF PRESENT ILLNESS
[FreeTextEntry1] : 65 yo male w pmhx HTN, BPH, glaucoma pw one year hx of right sided head pain and four month hx of left arm and leg intermittent pain and weakness.  Head pain is always right sided, begin at approximately 4 pm and peaks at 10/10 by 10 pm and then decreases through the night. Intermittently with head pain he has right eye tearing and hypersalivation.  He has tried aspirin, tylenol and motrin with no effect.  He left arm and left leg pain began four months prior to exam, is sharp and brief in nature and worse with walking, improved with sitting.  He denies trauma, LOC, dizziness, nausea, vomiting, visual sxs, tinnitus.

## 2021-02-16 NOTE — DISCUSSION/SUMMARY
[FreeTextEntry1] : 67 yo male w pmhx HTN, BPH, glaucoma pw one year hx of right sided head pain and four month hx of left arm and leg intermittent pain and weakness.  Head pain is always right sided, begin at approximately 4 pm and peaks at 10/10 by 10 pm and then decreases through the night. Intermittently with head pain he has right eye tearing and hypersalivation.  He has tried aspirin, tylenol and motrin with no effect.  He left arm and left leg pain began four months prior to exam, is sharp and brief in nature and worse with walking, improved with sitting.  He denies trauma, LOC, dizziness, nausea, vomiting, visual sxs, tinnitus.  Neurological exam normal.  Impression is possible TAC headache and possible cervical and lumbar stenosis / arthritis.\par \par Recommendations:\par MRI w/o contrast brain, cervical spine, lumbar spine\par Indomethacin 25mg TID for one week trial, if help continue\par RTC 3 months after MRI complete\par \par

## 2021-03-04 ENCOUNTER — RESULT REVIEW (OUTPATIENT)
Age: 66
End: 2021-03-04

## 2021-03-04 ENCOUNTER — OUTPATIENT (OUTPATIENT)
Dept: OUTPATIENT SERVICES | Facility: HOSPITAL | Age: 66
LOS: 1 days | End: 2021-03-04
Payer: MEDICAID

## 2021-03-04 ENCOUNTER — APPOINTMENT (OUTPATIENT)
Dept: MRI IMAGING | Facility: CLINIC | Age: 66
End: 2021-03-04

## 2021-03-04 DIAGNOSIS — R52 PAIN, UNSPECIFIED: ICD-10-CM

## 2021-03-04 DIAGNOSIS — Z98.890 OTHER SPECIFIED POSTPROCEDURAL STATES: Chronic | ICD-10-CM

## 2021-03-04 DIAGNOSIS — Z98.41 CATARACT EXTRACTION STATUS, RIGHT EYE: Chronic | ICD-10-CM

## 2021-03-04 PROCEDURE — 72141 MRI NECK SPINE W/O DYE: CPT | Mod: 26

## 2021-03-04 PROCEDURE — 72141 MRI NECK SPINE W/O DYE: CPT

## 2021-03-04 PROCEDURE — 70551 MRI BRAIN STEM W/O DYE: CPT | Mod: 26

## 2021-03-04 PROCEDURE — 72148 MRI LUMBAR SPINE W/O DYE: CPT | Mod: 26

## 2021-03-04 PROCEDURE — 72148 MRI LUMBAR SPINE W/O DYE: CPT

## 2021-03-04 PROCEDURE — 70551 MRI BRAIN STEM W/O DYE: CPT

## 2021-03-16 ENCOUNTER — APPOINTMENT (OUTPATIENT)
Dept: NEUROLOGY | Facility: HOSPITAL | Age: 66
End: 2021-03-16

## 2021-03-16 ENCOUNTER — OUTPATIENT (OUTPATIENT)
Dept: OUTPATIENT SERVICES | Facility: HOSPITAL | Age: 66
LOS: 1 days | End: 2021-03-16
Payer: MEDICAID

## 2021-03-16 VITALS
TEMPERATURE: 96 F | HEART RATE: 85 BPM | HEIGHT: 64 IN | DIASTOLIC BLOOD PRESSURE: 94 MMHG | BODY MASS INDEX: 19.46 KG/M2 | WEIGHT: 114 LBS | SYSTOLIC BLOOD PRESSURE: 169 MMHG

## 2021-03-16 DIAGNOSIS — Z98.41 CATARACT EXTRACTION STATUS, RIGHT EYE: Chronic | ICD-10-CM

## 2021-03-16 DIAGNOSIS — M54.16 RADICULOPATHY, LUMBAR REGION: ICD-10-CM

## 2021-03-16 DIAGNOSIS — Z98.890 OTHER SPECIFIED POSTPROCEDURAL STATES: Chronic | ICD-10-CM

## 2021-03-16 DIAGNOSIS — R56.9 UNSPECIFIED CONVULSIONS: ICD-10-CM

## 2021-03-16 DIAGNOSIS — R51.9 HEADACHE, UNSPECIFIED: ICD-10-CM

## 2021-03-16 PROCEDURE — G0463: CPT

## 2021-03-16 RX ORDER — INDOMETHACIN 25 MG/1
25 CAPSULE ORAL 3 TIMES DAILY
Qty: 90 | Refills: 2 | Status: DISCONTINUED | COMMUNITY
Start: 2021-02-16 | End: 2021-03-16

## 2021-03-16 NOTE — HISTORY OF PRESENT ILLNESS
[FreeTextEntry1] : 64yo man w/PMH of HTN, BPH, and glaucoma presents to neurology clinic for follow-up of right sided head pain and left arm and leg intermittent pain and weakness. Patient is accompanied by his daughter who served as . Patient reports that the pain radiates down from his back through the posterior lower extremities. It has been going on for 3-4 years but has worsened over the past year. The pain improves with walking however the weakness prevents him from walking outside or for long distances. Patient states the pain is worse at night and is on average 9/10 in severity daily. He typically is unable to sleep during the night and sleeps around 0500h for 6-7 hours. He also reports R-sided headaches that are associated with increased lacrimation and pain in the ear, but denies changes in vision, tinnitus or hearing loss, pain with mastication, nausea, vomiting, dizziness, recent illness, trauma or falls. He has tried taking Indomethacin with no effect, as well as Tylenol, Motrin, and aspirin in the past. MRI c-spine shows mild/moderate foraminal stenoses and l-spine shows moderate/severe foraminal stenoses.

## 2021-03-16 NOTE — PHYSICAL EXAM
[Oriented To Time, Place, And Person] : oriented to person, place, and time [Affect] : the affect was normal [Memory Recent] : recent memory was not impaired [Cranial Nerves Optic (II)] : visual acuity intact bilaterally,  visual fields full to confrontation, pupils equal round and reactive to light [Cranial Nerves Oculomotor (III)] : extraocular motion intact [Cranial Nerves Trigeminal (V)] : facial sensation intact symmetrically [Cranial Nerves Facial (VII)] : face symmetrical [Cranial Nerves Vestibulocochlear (VIII)] : hearing was intact bilaterally [Cranial Nerves Glossopharyngeal (IX)] : tongue and palate midline [Cranial Nerves Accessory (XI - Cranial And Spinal)] : head turning and shoulder shrug symmetric [Cranial Nerves Hypoglossal (XII)] : there was no tongue deviation with protrusion [Motor Tone] : muscle tone was normal in all four extremities [Involuntary Movements] : no involuntary movements were seen [Sensation Tactile Decrease] : light touch was intact [Abnormal Walk] : normal gait [Balance] : balance was intact [+4] : arm extension  +4/5 [4] : hip flexion 4/5 [5] : ankle dorsiflexion 5/5 [1+] : Patella left 1+ [Coordination - Dysmetria Impaired Finger-to-Nose Bilateral] : not present [Coordination - Dysmetria Impaired Heel-to-Shin Bilateral] : not present [Plantar Reflex Right Only] : normal on the right [Plantar Reflex Left Only] : normal on the left [___] : absent on the right [___] : absent on the left

## 2021-03-16 NOTE — DISCUSSION/SUMMARY
[FreeTextEntry1] : 64yo man w/PMH of HTN, BPH, and glaucoma presents to neurology clinic for follow-up of right sided head pain and left arm and leg intermittent pain and weakness that has worsened over the past year. Patient is accompanied by his daughter who served as . Patient reports that the pain radiates down from his back through the posterior lower extremities. It has been going on for 3-4 years but has worsened over the past year. The pain improves with walking however the weakness prevents him from walking outside or for long distances. He has tried taking Indomethacin with no effect, as well as Tylenol, Motrin, and aspirin in the past. MRI c-spine shows mild/moderate foraminal stenoses and l-spine shows moderate/severe foraminal stenoses. Physical exam remarkable for mild weakness of the lower extremities with increased posterior leg pain with leg raise.\par \par Impression: R-sided headache, and lower extremity weakness and pain due to cervical and lumbar stenosis with degenerative disc disease.\par \par Recommendations:\par - Discontinue Indomethacin\par - Referral for neurosurgery\par - Return to clinic in 3 months\par \par

## 2021-03-26 ENCOUNTER — APPOINTMENT (OUTPATIENT)
Dept: OPHTHALMOLOGY | Facility: CLINIC | Age: 66
End: 2021-03-26

## 2021-04-05 ENCOUNTER — OUTPATIENT (OUTPATIENT)
Dept: OUTPATIENT SERVICES | Facility: HOSPITAL | Age: 66
LOS: 1 days | End: 2021-04-05

## 2021-04-05 ENCOUNTER — APPOINTMENT (OUTPATIENT)
Dept: NEUROSURGERY | Facility: HOSPITAL | Age: 66
End: 2021-04-05

## 2021-04-05 VITALS
HEIGHT: 64 IN | DIASTOLIC BLOOD PRESSURE: 83 MMHG | HEART RATE: 86 BPM | WEIGHT: 115 LBS | TEMPERATURE: 97.7 F | SYSTOLIC BLOOD PRESSURE: 148 MMHG | BODY MASS INDEX: 19.63 KG/M2

## 2021-04-05 DIAGNOSIS — M54.42 LUMBAGO WITH SCIATICA, LEFT SIDE: ICD-10-CM

## 2021-04-05 DIAGNOSIS — M41.26 OTHER IDIOPATHIC SCOLIOSIS, LUMBAR REGION: ICD-10-CM

## 2021-04-05 DIAGNOSIS — Z98.890 OTHER SPECIFIED POSTPROCEDURAL STATES: Chronic | ICD-10-CM

## 2021-04-05 DIAGNOSIS — M54.16 RADICULOPATHY, LUMBAR REGION: ICD-10-CM

## 2021-04-05 DIAGNOSIS — Z98.41 CATARACT EXTRACTION STATUS, RIGHT EYE: Chronic | ICD-10-CM

## 2021-04-06 ENCOUNTER — APPOINTMENT (OUTPATIENT)
Dept: FAMILY MEDICINE | Facility: CLINIC | Age: 66
End: 2021-04-06
Payer: MEDICAID

## 2021-04-06 VITALS
OXYGEN SATURATION: 92 % | HEIGHT: 64 IN | BODY MASS INDEX: 19.46 KG/M2 | DIASTOLIC BLOOD PRESSURE: 83 MMHG | TEMPERATURE: 97.7 F | HEART RATE: 101 BPM | SYSTOLIC BLOOD PRESSURE: 139 MMHG | WEIGHT: 114 LBS

## 2021-04-06 DIAGNOSIS — K04.7 PERIAPICAL ABSCESS W/OUT SINUS: ICD-10-CM

## 2021-04-06 DIAGNOSIS — K21.9 GASTRO-ESOPHAGEAL REFLUX DISEASE W/OUT ESOPHAGITIS: ICD-10-CM

## 2021-04-06 DIAGNOSIS — R51.9 HEADACHE, UNSPECIFIED: ICD-10-CM

## 2021-04-06 PROCEDURE — 99214 OFFICE O/P EST MOD 30 MIN: CPT

## 2021-04-06 RX ORDER — CHLORHEXIDINE GLUCONATE, 0.12% ORAL RINSE 1.2 MG/ML
0.12 SOLUTION DENTAL
Qty: 1 | Refills: 1 | Status: ACTIVE | COMMUNITY
Start: 2021-04-06 | End: 1900-01-01

## 2021-04-06 NOTE — PHYSICAL EXAM
[Normal] : affect was normal and insight and judgment were intact [de-identified] : poor dentition, missing teeth, discolored and dark [de-identified] : Headache is relieved with slight pressure to forehead.

## 2021-04-06 NOTE — HISTORY OF PRESENT ILLNESS
[Family Member] : family member [FreeTextEntry1] : headache [de-identified] : 66 yo M with chronic GERD, cervicalgia, severe lumbar stenosis presents for headache. Daughter presents today to translate.\par \par Headache is throbbing in nature, mostly occurring at night. It is over right eye, all the way down to neck. Saw neurologist and neurosurgery-- will be having surgery for lumbar stenosis. Has some impingement and stenosis in cervical spine but mild-moderate. Pt has had this headache for almost 2 years now. Headache does improve with applying pressure on head. Previously worked up for temporal arteritis as arteries were pulsating when he had headache. \par \par dental pain-- pt has poor dentition. Last time he went to see dentist was 2 years ago. Pt afraid to go due to COVID. Has not received vaccines yet. Pt c/o tooth pain. \par \par Takes famotidine chronically for GERD. Has bad taste in mouth every day. Does not eat spicy, tomato sauce, onions, etc. Does not lie down after eating. Last endoscopy was many years ago.\par \par Pt also requesting refills.\par GERD-- on famotidine chronically. \par \par Pt also requesting annual labs, but will have to postpone for another day due to multiple issues.

## 2021-05-04 ENCOUNTER — NON-APPOINTMENT (OUTPATIENT)
Age: 66
End: 2021-05-04

## 2021-05-04 ENCOUNTER — APPOINTMENT (OUTPATIENT)
Dept: FAMILY MEDICINE | Facility: CLINIC | Age: 66
End: 2021-05-04
Payer: MEDICAID

## 2021-05-04 VITALS
OXYGEN SATURATION: 94 % | HEART RATE: 63 BPM | DIASTOLIC BLOOD PRESSURE: 87 MMHG | WEIGHT: 117 LBS | TEMPERATURE: 98 F | BODY MASS INDEX: 19.97 KG/M2 | HEIGHT: 64 IN | SYSTOLIC BLOOD PRESSURE: 147 MMHG

## 2021-05-04 VITALS — DIASTOLIC BLOOD PRESSURE: 80 MMHG | SYSTOLIC BLOOD PRESSURE: 136 MMHG

## 2021-05-04 DIAGNOSIS — R59.9 ENLARGED LYMPH NODES, UNSPECIFIED: ICD-10-CM

## 2021-05-04 DIAGNOSIS — Z12.11 ENCOUNTER FOR SCREENING FOR MALIGNANT NEOPLASM OF COLON: ICD-10-CM

## 2021-05-04 DIAGNOSIS — Z12.5 ENCOUNTER FOR SCREENING FOR MALIGNANT NEOPLASM OF PROSTATE: ICD-10-CM

## 2021-05-04 DIAGNOSIS — E55.9 VITAMIN D DEFICIENCY, UNSPECIFIED: ICD-10-CM

## 2021-05-04 DIAGNOSIS — Z00.00 ENCOUNTER FOR GENERAL ADULT MEDICAL EXAMINATION W/OUT ABNORMAL FINDINGS: ICD-10-CM

## 2021-05-04 PROCEDURE — 93000 ELECTROCARDIOGRAM COMPLETE: CPT

## 2021-05-04 PROCEDURE — 99213 OFFICE O/P EST LOW 20 MIN: CPT | Mod: 25

## 2021-05-04 PROCEDURE — 99397 PER PM REEVAL EST PAT 65+ YR: CPT | Mod: 25

## 2021-05-04 RX ORDER — ACETAMINOPHEN 650 MG/1
650 TABLET, EXTENDED RELEASE ORAL 4 TIMES DAILY
Qty: 120 | Refills: 1 | Status: ACTIVE | COMMUNITY
Start: 2020-06-23 | End: 1900-01-01

## 2021-05-04 RX ORDER — AMOXICILLIN 500 MG/1
500 CAPSULE ORAL EVERY 8 HOURS
Qty: 15 | Refills: 0 | Status: COMPLETED | COMMUNITY
Start: 2021-04-06 | End: 2021-05-04

## 2021-05-04 NOTE — PHYSICAL EXAM
[Normal TMs] : both tympanic membranes were normal [Normal Gait] : normal gait [Normal] : affect was normal and insight and judgment were intact [de-identified] : subtle swelling noted just behind ear. Somewhat tender to palpation. No other lymphadenopathy

## 2021-05-04 NOTE — HISTORY OF PRESENT ILLNESS
[Family Member] : family member [FreeTextEntry1] : annual [de-identified] : 66 yo M with chronic GERD, HTN, PVD, lumbar stenosis presents for annual. \par \par Pt received 1st dose of pfizer. 2nd dose next week.\par \par diet-- meat, vegetables, mostly home cooked.\par exercise-- limited due to severe lumbar stenosis. Pt was recommended to have surgery by neurosurgeon.\par \par Pt c/o daily right sided headache. Pt does notice a swollen gland behind ear and that sometimes contributes to headache. He does have mild-moderate cervical stenosis that may also be contributing to the daily headaches. \par \par Pt currently on gabapentin 300mg, will increase to 600mg twice daily.

## 2021-05-04 NOTE — HEALTH RISK ASSESSMENT
[Fully functional (bathing, dressing, toileting, transferring, walking, feeding)] : Fully functional (bathing, dressing, toileting, transferring, walking, feeding) [Some assistance needed] : managing finances [Full assistance needed] : using transportation

## 2021-05-05 LAB
25(OH)D3 SERPL-MCNC: 22.2 NG/ML
ALBUMIN SERPL ELPH-MCNC: 4.2 G/DL
ALP BLD-CCNC: 109 U/L
ALT SERPL-CCNC: 14 U/L
ANION GAP SERPL CALC-SCNC: 12 MMOL/L
APPEARANCE: CLEAR
AST SERPL-CCNC: 18 U/L
BASOPHILS # BLD AUTO: 0.04 K/UL
BASOPHILS NFR BLD AUTO: 0.5 %
BILIRUB SERPL-MCNC: 0.4 MG/DL
BILIRUBIN URINE: NEGATIVE
BLOOD URINE: NEGATIVE
BUN SERPL-MCNC: 9 MG/DL
CALCIUM SERPL-MCNC: 9 MG/DL
CHLORIDE SERPL-SCNC: 106 MMOL/L
CHOLEST SERPL-MCNC: 111 MG/DL
CO2 SERPL-SCNC: 23 MMOL/L
COLOR: COLORLESS
CREAT SERPL-MCNC: 0.92 MG/DL
EOSINOPHIL # BLD AUTO: 0.38 K/UL
EOSINOPHIL NFR BLD AUTO: 4.8 %
ESTIMATED AVERAGE GLUCOSE: 140 MG/DL
GLUCOSE QUALITATIVE U: NEGATIVE
GLUCOSE SERPL-MCNC: 91 MG/DL
HBA1C MFR BLD HPLC: 6.5 %
HCT VFR BLD CALC: 41.6 %
HDLC SERPL-MCNC: 24 MG/DL
HGB BLD-MCNC: 13.4 G/DL
IMM GRANULOCYTES NFR BLD AUTO: 0.3 %
KETONES URINE: NEGATIVE
LDLC SERPL CALC-MCNC: 63 MG/DL
LEUKOCYTE ESTERASE URINE: NEGATIVE
LYMPHOCYTES # BLD AUTO: 2.13 K/UL
LYMPHOCYTES NFR BLD AUTO: 26.8 %
MAN DIFF?: NORMAL
MCHC RBC-ENTMCNC: 32.2 GM/DL
MCHC RBC-ENTMCNC: 32.2 PG
MCV RBC AUTO: 100 FL
MONOCYTES # BLD AUTO: 0.56 K/UL
MONOCYTES NFR BLD AUTO: 7 %
NEUTROPHILS # BLD AUTO: 4.82 K/UL
NEUTROPHILS NFR BLD AUTO: 60.6 %
NITRITE URINE: NEGATIVE
NONHDLC SERPL-MCNC: 87 MG/DL
PH URINE: 7
PLATELET # BLD AUTO: 227 K/UL
POTASSIUM SERPL-SCNC: 4 MMOL/L
PROT SERPL-MCNC: 6.9 G/DL
PROTEIN URINE: NEGATIVE
PSA SERPL-MCNC: 0.83 NG/ML
RBC # BLD: 4.16 M/UL
RBC # FLD: 12.9 %
SODIUM SERPL-SCNC: 140 MMOL/L
SPECIFIC GRAVITY URINE: 1.01
TRIGL SERPL-MCNC: 122 MG/DL
TSH SERPL-ACNC: 1.29 UIU/ML
UROBILINOGEN URINE: NORMAL
WBC # FLD AUTO: 7.95 K/UL

## 2021-06-01 LAB — HEMOCCULT STL QL IA: NEGATIVE

## 2021-06-03 NOTE — PLAN
[FreeTextEntry1] : - Recommend L spine XR (flexion extension) \par - Followup with Dr. Henry in office to discuss possible decompression and fusion. \par

## 2021-06-03 NOTE — PHYSICAL EXAM
[General Appearance - Alert] : alert [Oriented To Time, Place, And Person] : oriented to person, place, and time [I: Normal Smell] : smell intact bilaterally [Cranial Nerves Optic (II)] : visual acuity intact bilaterally,  pupils equal round and reactive to light [Cranial Nerves Oculomotor (III)] : extraocular motion intact [Cranial Nerves Trigeminal (V)] : facial sensation intact symmetrically [Cranial Nerves Facial (VII)] : face symmetrical [Cranial Nerves Vestibulocochlear (VIII)] : hearing was intact bilaterally [Cranial Nerves Glossopharyngeal (IX)] : tongue and palate midline [Cranial Nerves Accessory (XI - Cranial And Spinal)] : head turning and shoulder shrug symmetric [Cranial Nerves Hypoglossal (XII)] : there was no tongue deviation with protrusion [Motor Tone] : muscle tone was normal in all four extremities [Motor Strength] : muscle strength was normal in all four extremities [5] : S1 toe walking 5/5 [Sensation Tactile Decrease] : light touch was intact [Abnormal Walk] : normal gait [2+] : Patella left 2+ [Intact] : all motor groups within normal limits of strength and tone bilaterally [Extraocular Movements] : extraocular movements were intact [PERRL With Normal Accommodation] : pupils were equal in size, round, reactive to light, with normal accommodation [___] : absent on the right [Sosa] : Sosa's sign was not demonstrated

## 2021-06-03 NOTE — ASSESSMENT
[FreeTextEntry1] : 65 M with LBP with b/l LE radiculopathy. Neurologically intact. Attempted pain medications with no improvement. Pain limits walking and is severe. MRI L-spine with L4-5 spondylolisthesis and severe stenosis at L3 and L4.  \par

## 2021-06-03 NOTE — END OF VISIT
[] : Resident [Time Spent: ___ minutes] : I have spent [unfilled] minutes of time on the encounter. [FreeTextEntry3] : Agree with the above assessment and plan. Will discuss possible OR with patient at next clinic visit.

## 2021-06-03 NOTE — DATA REVIEWED
[de-identified] :  C spine with degenrative changes and some mild stenosis. L spine with L4-5 listhesis and severe stenosis at L 3 and 4

## 2021-06-03 NOTE — HISTORY OF PRESENT ILLNESS
[FreeTextEntry1] : LBP  [de-identified] : 65 M with significant LBP with b/l LE radiculopathy. Patient also notes some R sided headaches. No neck pain.

## 2021-06-08 ENCOUNTER — APPOINTMENT (OUTPATIENT)
Dept: NEUROLOGY | Facility: HOSPITAL | Age: 66
End: 2021-06-08

## 2021-06-11 ENCOUNTER — RX RENEWAL (OUTPATIENT)
Age: 66
End: 2021-06-11

## 2021-07-02 ENCOUNTER — APPOINTMENT (OUTPATIENT)
Dept: FAMILY MEDICINE | Facility: CLINIC | Age: 66
End: 2021-07-02
Payer: MEDICAID

## 2021-07-02 VITALS
TEMPERATURE: 98.1 F | OXYGEN SATURATION: 95 % | HEART RATE: 92 BPM | DIASTOLIC BLOOD PRESSURE: 85 MMHG | BODY MASS INDEX: 18.95 KG/M2 | SYSTOLIC BLOOD PRESSURE: 129 MMHG | WEIGHT: 111 LBS | HEIGHT: 64 IN

## 2021-07-02 DIAGNOSIS — M19.90 UNSPECIFIED OSTEOARTHRITIS, UNSPECIFIED SITE: ICD-10-CM

## 2021-07-02 PROCEDURE — 99213 OFFICE O/P EST LOW 20 MIN: CPT

## 2021-07-02 RX ORDER — MELOXICAM 15 MG/1
15 TABLET ORAL
Qty: 30 | Refills: 1 | Status: COMPLETED | COMMUNITY
Start: 2021-04-06 | End: 2021-07-02

## 2021-07-02 NOTE — HISTORY OF PRESENT ILLNESS
[FreeTextEntry1] : f/u [de-identified] : Pt w/ bilateral shoulder pain-- takes celebrex which helps. Needs refills. Meloxicam does not help at all. \par \par Pt with severe lumbar stenosis on MRI-- saw neurosx, was recommended to undergo surgery but has been hesistant. Able to sit for 1-2 hours but cannot stand or walk for more than a minute without feeling pain and lower extremity weakness.\par \par Also needs home care form completed. Pt able to take meds on own, able to self direct. Not doing any therapies currently.

## 2021-07-02 NOTE — PHYSICAL EXAM
[Normal] : affect was normal and insight and judgment were intact [de-identified] : FROM shoulder, normal gait

## 2021-08-20 ENCOUNTER — APPOINTMENT (OUTPATIENT)
Dept: OPHTHALMOLOGY | Facility: CLINIC | Age: 66
End: 2021-08-20

## 2021-09-21 ENCOUNTER — APPOINTMENT (OUTPATIENT)
Dept: OPHTHALMOLOGY | Facility: CLINIC | Age: 66
End: 2021-09-21

## 2021-09-23 ENCOUNTER — APPOINTMENT (OUTPATIENT)
Dept: OPHTHALMOLOGY | Facility: CLINIC | Age: 66
End: 2021-09-23

## 2021-10-11 ENCOUNTER — APPOINTMENT (OUTPATIENT)
Dept: UROLOGY | Facility: CLINIC | Age: 66
End: 2021-10-11
Payer: MEDICAID

## 2021-10-11 PROCEDURE — 99212 OFFICE O/P EST SF 10 MIN: CPT

## 2021-10-11 RX ORDER — OXYBUTYNIN CHLORIDE 5 MG/1
5 TABLET ORAL TWICE DAILY
Qty: 60 | Refills: 8 | Status: ACTIVE | COMMUNITY
Start: 2021-03-25

## 2021-10-11 NOTE — HISTORY OF PRESENT ILLNESS
[FreeTextEntry1] : The patient is a 66 year old male presenting for a follow up visit.\par Family member states that the patient is doing well on Tamsulosin and Oxybutynin.\par Complains of dry mouth.\par Nocturia 3 times per night.\par PSA from 5/4/21 was 0.83 ng/mL.\par \par I advised the patient to eat candy and chew gum to help with the dry mouth.\par \par I renewed the prescriptions for Tamsulosin and Oxybutynin.\par \par The patient will return in 6 months to follow up.

## 2021-10-11 NOTE — PHYSICAL EXAM
[General Appearance - Well Developed] : well developed [General Appearance - Well Nourished] : well nourished [Normal Appearance] : normal appearance [Well Groomed] : well groomed [Abdomen Soft] : soft [General Appearance - In No Acute Distress] : no acute distress [Abdomen Tenderness] : non-tender [] : no respiratory distress [Edema] : no peripheral edema [Respiration, Rhythm And Depth] : normal respiratory rhythm and effort [Exaggerated Use Of Accessory Muscles For Inspiration] : no accessory muscle use [Oriented To Time, Place, And Person] : oriented to person, place, and time [Affect] : the affect was normal [Mood] : the mood was normal [Not Anxious] : not anxious [Normal Station and Gait] : the gait and station were normal for the patient's age [No Focal Deficits] : no focal deficits [No Palpable Adenopathy] : no palpable adenopathy

## 2021-10-11 NOTE — ASSESSMENT
[FreeTextEntry1] : The patient is a 66 year old male presenting for a follow up visit.\par Family member states that the patient is doing well on Tamsulosin and Oxybutynin.\par Complains of dry mouth.\par Nocturia 3 times per night.\par PSA from 5/4/21 was 0.83 ng/mL.\par \par I advised the patient to eat candy and chew gum to help with the dry mouth.\par \par I renewed the prescriptions for Tamsulosin and Oxybutynin.\par \par The patient will return in 6 months to follow up. \par \par I spent 15 minutes with the patient.

## 2021-12-02 ENCOUNTER — APPOINTMENT (OUTPATIENT)
Dept: OPHTHALMOLOGY | Facility: CLINIC | Age: 66
End: 2021-12-02

## 2021-12-16 ENCOUNTER — APPOINTMENT (OUTPATIENT)
Dept: OPHTHALMOLOGY | Facility: CLINIC | Age: 66
End: 2021-12-16

## 2022-01-27 ENCOUNTER — APPOINTMENT (OUTPATIENT)
Dept: FAMILY MEDICINE | Facility: CLINIC | Age: 67
End: 2022-01-27
Payer: MEDICAID

## 2022-01-27 VITALS
TEMPERATURE: 98.2 F | OXYGEN SATURATION: 95 % | HEART RATE: 72 BPM | HEIGHT: 64 IN | WEIGHT: 114.25 LBS | DIASTOLIC BLOOD PRESSURE: 74 MMHG | BODY MASS INDEX: 19.5 KG/M2 | SYSTOLIC BLOOD PRESSURE: 119 MMHG

## 2022-01-27 PROCEDURE — 99214 OFFICE O/P EST MOD 30 MIN: CPT | Mod: 25

## 2022-01-27 RX ORDER — HYDROCORTISONE 10 MG/G
1 CREAM TOPICAL
Qty: 1 | Refills: 2 | Status: ACTIVE | COMMUNITY
Start: 1900-01-01 | End: 1900-01-01

## 2022-01-28 LAB — SARS-COV-2 N GENE NPH QL NAA+PROBE: NOT DETECTED

## 2022-01-30 ENCOUNTER — OUTPATIENT (OUTPATIENT)
Dept: OUTPATIENT SERVICES | Facility: HOSPITAL | Age: 67
LOS: 1 days | End: 2022-01-30
Payer: MEDICAID

## 2022-01-30 ENCOUNTER — APPOINTMENT (OUTPATIENT)
Dept: RADIOLOGY | Facility: IMAGING CENTER | Age: 67
End: 2022-01-30

## 2022-01-30 ENCOUNTER — RESULT REVIEW (OUTPATIENT)
Age: 67
End: 2022-01-30

## 2022-01-30 DIAGNOSIS — R04.2 HEMOPTYSIS: ICD-10-CM

## 2022-01-30 DIAGNOSIS — Z98.41 CATARACT EXTRACTION STATUS, RIGHT EYE: Chronic | ICD-10-CM

## 2022-01-30 DIAGNOSIS — Z98.890 OTHER SPECIFIED POSTPROCEDURAL STATES: Chronic | ICD-10-CM

## 2022-01-30 PROCEDURE — 71046 X-RAY EXAM CHEST 2 VIEWS: CPT

## 2022-01-30 PROCEDURE — 71046 X-RAY EXAM CHEST 2 VIEWS: CPT | Mod: 26

## 2022-02-02 LAB
M TB IFN-G BLD-IMP: NEGATIVE
QUANTIFERON TB PLUS MITOGEN MINUS NIL: 9.97 IU/ML
QUANTIFERON TB PLUS NIL: 0.03 IU/ML
QUANTIFERON TB PLUS TB1 MINUS NIL: 0.14 IU/ML
QUANTIFERON TB PLUS TB2 MINUS NIL: 0.19 IU/ML

## 2022-02-03 NOTE — PHYSICAL EXAM
[No Respiratory Distress] : no respiratory distress  [Normal] : affect was normal and insight and judgment were intact [de-identified] : fine crackles base of lung b/l, no wheezing

## 2022-02-03 NOTE — ADDENDUM
[FreeTextEntry1] : CXR with RUL infiltrate. Cover with azithromycin. Check AFB culture x 3. Check CT chest with/without contrast for better characterization, r/o malignancy/infection, etc.

## 2022-02-09 ENCOUNTER — RESULT REVIEW (OUTPATIENT)
Age: 67
End: 2022-02-09

## 2022-02-10 ENCOUNTER — NON-APPOINTMENT (OUTPATIENT)
Age: 67
End: 2022-02-10

## 2022-02-16 ENCOUNTER — APPOINTMENT (OUTPATIENT)
Dept: CT IMAGING | Facility: IMAGING CENTER | Age: 67
End: 2022-02-16
Payer: MEDICAID

## 2022-02-16 ENCOUNTER — OUTPATIENT (OUTPATIENT)
Dept: OUTPATIENT SERVICES | Facility: HOSPITAL | Age: 67
LOS: 1 days | End: 2022-02-16
Payer: MEDICAID

## 2022-02-16 DIAGNOSIS — Z98.890 OTHER SPECIFIED POSTPROCEDURAL STATES: Chronic | ICD-10-CM

## 2022-02-16 DIAGNOSIS — Z98.41 CATARACT EXTRACTION STATUS, RIGHT EYE: Chronic | ICD-10-CM

## 2022-02-16 DIAGNOSIS — R04.2 HEMOPTYSIS: ICD-10-CM

## 2022-02-16 PROCEDURE — 71260 CT THORAX DX C+: CPT

## 2022-02-16 PROCEDURE — 71260 CT THORAX DX C+: CPT | Mod: 26

## 2022-02-16 PROCEDURE — 82565 ASSAY OF CREATININE: CPT

## 2022-02-17 DIAGNOSIS — J18.9 PNEUMONIA, UNSPECIFIED ORGANISM: ICD-10-CM

## 2022-02-17 RX ORDER — AZITHROMYCIN 250 MG/1
250 TABLET, FILM COATED ORAL
Qty: 1 | Refills: 0 | Status: COMPLETED | COMMUNITY
Start: 2022-02-02 | End: 2022-02-17

## 2022-02-17 RX ORDER — LEVOFLOXACIN 750 MG/1
750 TABLET, FILM COATED ORAL DAILY
Qty: 5 | Refills: 0 | Status: COMPLETED | COMMUNITY
Start: 2022-02-17 | End: 2022-02-22

## 2022-03-10 ENCOUNTER — APPOINTMENT (OUTPATIENT)
Dept: FAMILY MEDICINE | Facility: CLINIC | Age: 67
End: 2022-03-10
Payer: MEDICAID

## 2022-03-10 VITALS
WEIGHT: 115 LBS | SYSTOLIC BLOOD PRESSURE: 128 MMHG | HEIGHT: 64 IN | HEART RATE: 70 BPM | OXYGEN SATURATION: 92 % | DIASTOLIC BLOOD PRESSURE: 75 MMHG | BODY MASS INDEX: 19.63 KG/M2 | TEMPERATURE: 97.9 F

## 2022-03-10 DIAGNOSIS — M54.12 RADICULOPATHY, CERVICAL REGION: ICD-10-CM

## 2022-03-10 DIAGNOSIS — M41.26 OTHER IDIOPATHIC SCOLIOSIS, LUMBAR REGION: ICD-10-CM

## 2022-03-10 DIAGNOSIS — R04.2 HEMOPTYSIS: ICD-10-CM

## 2022-03-10 PROCEDURE — 99214 OFFICE O/P EST MOD 30 MIN: CPT

## 2022-03-10 RX ORDER — METHOCARBAMOL 500 MG/1
500 TABLET, FILM COATED ORAL
Qty: 90 | Refills: 3 | Status: COMPLETED | COMMUNITY
Start: 2018-08-03 | End: 2022-03-10

## 2022-03-10 NOTE — HISTORY OF PRESENT ILLNESS
[FreeTextEntry1] : f/u [de-identified] : 65 yo M here for f/u-- multiple concerns\par \par hemoptysis with cough-- seems to have improved but still coughing. Gets SOB and fatigue. Sputum culture + MAC. Needs pulm f/u.\par \par b/l arm weakness/pain--MRI---  has cervical degeneration with mild stenosis. Also with severe L-spine spondylolisthesis and stenosis. On gabapentin, methacarbamol and celecoxib-- not helpful. Would like to try different muscle relaxer. Would also like to try PT.

## 2022-03-10 NOTE — PHYSICAL EXAM
[Normal] : normal rate, regular rhythm, normal S1 and S2 and no murmur heard [Normal Gait] : normal gait [de-identified] : full ROM. Tender with deep palpation along humerus.

## 2022-03-15 ENCOUNTER — APPOINTMENT (OUTPATIENT)
Dept: PULMONOLOGY | Facility: CLINIC | Age: 67
End: 2022-03-15
Payer: MEDICAID

## 2022-03-15 VITALS
TEMPERATURE: 97.6 F | SYSTOLIC BLOOD PRESSURE: 125 MMHG | HEART RATE: 75 BPM | BODY MASS INDEX: 19.46 KG/M2 | WEIGHT: 114 LBS | DIASTOLIC BLOOD PRESSURE: 80 MMHG | HEIGHT: 64 IN | OXYGEN SATURATION: 91 %

## 2022-03-15 DIAGNOSIS — Z72.0 TOBACCO USE: ICD-10-CM

## 2022-03-15 DIAGNOSIS — J18.9 PNEUMONIA, UNSPECIFIED ORGANISM: ICD-10-CM

## 2022-03-15 PROCEDURE — 99203 OFFICE O/P NEW LOW 30 MIN: CPT

## 2022-03-15 RX ORDER — AZITHROMYCIN 500 MG/1
500 TABLET, FILM COATED ORAL
Qty: 12 | Refills: 3 | Status: ACTIVE | COMMUNITY
Start: 2022-03-15 | End: 1900-01-01

## 2022-03-15 NOTE — HISTORY OF PRESENT ILLNESS
[Current] : current [TextBox_4] : 66 year old male with h/o HBP, enlarged prostate had an episode of hemoptysis.  He had sputum evaluation which revealed M.avium by DNA probe.  He says his hemoptysis has resolved.  He now coughs with whitish sputum.  There is no fever, no chest pain on taking a deep breath.\par He has dyspnea on exertion. He is limited by back ache as well. His daughter Stephania Mercedes says he has spondylolisthesis. \par He smoked for more than 35-40 years, he has now reduced smoking 2-3 cigs a day.\par His chest CT scan done recently shows RUL consolidation.

## 2022-03-15 NOTE — PHYSICAL EXAM
[No Acute Distress] : no acute distress [Normal Oropharynx] : normal oropharynx [IV] : Mallampati Class: IV [Normal Appearance] : normal appearance [No Neck Mass] : no neck mass [Normal Rate/Rhythm] : normal rate/rhythm [Normal S1, S2] : normal s1, s2 [No Murmurs] : no murmurs [No Resp Distress] : no resp distress [Clear to Auscultation Bilaterally] : clear to auscultation bilaterally [No Abnormalities] : no abnormalities [Benign] : benign [Normal Gait] : normal gait [No Clubbing] : no clubbing [No Cyanosis] : no cyanosis [No Edema] : no edema [FROM] : FROM [Normal Color/ Pigmentation] : normal color/ pigmentation [No Focal Deficits] : no focal deficits [Oriented x3] : oriented x3 [Normal Affect] : normal affect [TextBox_2] : He has clibbing [TextBox_11] : Poor dentition and poor oral hygiene.

## 2022-03-15 NOTE — DISCUSSION/SUMMARY
[FreeTextEntry1] : Will treat the patient for pulmonary M avium infection. Advised to have eye check up ( will be started on ethambutol). Will return in 6 weeks when chest CT will be done.

## 2022-03-30 LAB
ACID FAST STN SPT: ABNORMAL
ACID FAST STN SPT: NORMAL
ACID FAST STN SPT: NORMAL

## 2022-04-11 PROBLEM — Z11.59 SCREENING FOR VIRAL DISEASE: Status: ACTIVE | Noted: 2020-08-10

## 2022-04-26 ENCOUNTER — APPOINTMENT (OUTPATIENT)
Dept: PULMONOLOGY | Facility: CLINIC | Age: 67
End: 2022-04-26

## 2022-05-02 ENCOUNTER — RX RENEWAL (OUTPATIENT)
Age: 67
End: 2022-05-02

## 2022-07-18 ENCOUNTER — RX RENEWAL (OUTPATIENT)
Age: 67
End: 2022-07-18

## 2022-07-19 PROBLEM — H40.003 GLAUCOMA SUSPECT OF BOTH EYES: Status: ACTIVE | Noted: 2017-06-23

## 2022-09-16 ENCOUNTER — APPOINTMENT (OUTPATIENT)
Dept: FAMILY MEDICINE | Facility: CLINIC | Age: 67
End: 2022-09-16

## 2023-01-01 ENCOUNTER — APPOINTMENT (OUTPATIENT)
Dept: INTERNAL MEDICINE | Facility: CLINIC | Age: 68
End: 2023-01-01
Payer: MEDICAID

## 2023-01-01 ENCOUNTER — APPOINTMENT (OUTPATIENT)
Dept: PULMONOLOGY | Facility: CLINIC | Age: 68
End: 2023-01-01
Payer: MEDICAID

## 2023-01-01 ENCOUNTER — RX RENEWAL (OUTPATIENT)
Age: 68
End: 2023-01-01

## 2023-01-01 ENCOUNTER — APPOINTMENT (OUTPATIENT)
Dept: PULMONOLOGY | Facility: CLINIC | Age: 68
End: 2023-01-01

## 2023-01-01 VITALS
HEIGHT: 64 IN | DIASTOLIC BLOOD PRESSURE: 68 MMHG | WEIGHT: 118 LBS | OXYGEN SATURATION: 83 % | HEART RATE: 70 BPM | BODY MASS INDEX: 20.14 KG/M2 | SYSTOLIC BLOOD PRESSURE: 110 MMHG

## 2023-01-01 VITALS — HEART RATE: 71 BPM | OXYGEN SATURATION: 87 %

## 2023-01-01 VITALS — SYSTOLIC BLOOD PRESSURE: 160 MMHG | DIASTOLIC BLOOD PRESSURE: 90 MMHG

## 2023-01-01 VITALS
WEIGHT: 120 LBS | BODY MASS INDEX: 20.49 KG/M2 | OXYGEN SATURATION: 94 % | HEIGHT: 64 IN | HEART RATE: 84 BPM | SYSTOLIC BLOOD PRESSURE: 185 MMHG | DIASTOLIC BLOOD PRESSURE: 105 MMHG

## 2023-01-01 VITALS
DIASTOLIC BLOOD PRESSURE: 100 MMHG | HEART RATE: 65 BPM | BODY MASS INDEX: 20.14 KG/M2 | HEIGHT: 64 IN | SYSTOLIC BLOOD PRESSURE: 185 MMHG | OXYGEN SATURATION: 85 % | WEIGHT: 118 LBS

## 2023-01-01 DIAGNOSIS — I10 ESSENTIAL (PRIMARY) HYPERTENSION: ICD-10-CM

## 2023-01-01 DIAGNOSIS — R29.898 OTHER SYMPTOMS AND SIGNS INVOLVING THE MUSCULOSKELETAL SYSTEM: ICD-10-CM

## 2023-01-01 DIAGNOSIS — R53.1 WEAKNESS: ICD-10-CM

## 2023-01-01 DIAGNOSIS — E78.00 PURE HYPERCHOLESTEROLEMIA, UNSPECIFIED: ICD-10-CM

## 2023-01-01 DIAGNOSIS — R73.03 PREDIABETES.: ICD-10-CM

## 2023-01-01 DIAGNOSIS — M54.42 LUMBAGO WITH SCIATICA, LEFT SIDE: ICD-10-CM

## 2023-01-01 DIAGNOSIS — M54.41 LUMBAGO WITH SCIATICA, LEFT SIDE: ICD-10-CM

## 2023-01-01 DIAGNOSIS — E53.8 DEFICIENCY OF OTHER SPECIFIED B GROUP VITAMINS: ICD-10-CM

## 2023-01-01 DIAGNOSIS — K59.09 OTHER CONSTIPATION: ICD-10-CM

## 2023-01-01 DIAGNOSIS — G89.29 LUMBAGO WITH SCIATICA, LEFT SIDE: ICD-10-CM

## 2023-01-01 DIAGNOSIS — M54.16 RADICULOPATHY, LUMBAR REGION: ICD-10-CM

## 2023-01-01 LAB
ALBUMIN SERPL ELPH-MCNC: 4.3 G/DL
ALP BLD-CCNC: 97 U/L
ALT SERPL-CCNC: 16 U/L
ANION GAP SERPL CALC-SCNC: 11 MMOL/L
AST SERPL-CCNC: 18 U/L
BILIRUB SERPL-MCNC: 0.3 MG/DL
BUN SERPL-MCNC: 10 MG/DL
CALCIUM SERPL-MCNC: 9 MG/DL
CHLORIDE SERPL-SCNC: 105 MMOL/L
CHOLEST SERPL-MCNC: 211 MG/DL
CO2 SERPL-SCNC: 25 MMOL/L
CREAT SERPL-MCNC: 0.95 MG/DL
EGFR: 87 ML/MIN/1.73M2
ESTIMATED AVERAGE GLUCOSE: 143 MG/DL
GLUCOSE SERPL-MCNC: 124 MG/DL
HBA1C MFR BLD HPLC: 6.6 %
HDLC SERPL-MCNC: 29 MG/DL
LDLC SERPL CALC-MCNC: 148 MG/DL
NONHDLC SERPL-MCNC: 181 MG/DL
POTASSIUM SERPL-SCNC: 4.2 MMOL/L
PROT SERPL-MCNC: 7.1 G/DL
SODIUM SERPL-SCNC: 141 MMOL/L
TRIGL SERPL-MCNC: 167 MG/DL
TSH SERPL-ACNC: 1.7 UIU/ML
VIT B12 SERPL-MCNC: <150 PG/ML

## 2023-01-01 PROCEDURE — 94010 BREATHING CAPACITY TEST: CPT

## 2023-01-01 PROCEDURE — ZZZZZ: CPT

## 2023-01-01 PROCEDURE — 99213 OFFICE O/P EST LOW 20 MIN: CPT | Mod: GC,25

## 2023-01-01 PROCEDURE — 99214 OFFICE O/P EST MOD 30 MIN: CPT

## 2023-01-01 RX ORDER — RIFAMPIN 300 MG/1
300 CAPSULE ORAL
Qty: 90 | Refills: 1 | Status: COMPLETED | COMMUNITY
Start: 2022-03-15 | End: 2023-01-01

## 2023-01-01 RX ORDER — ETHAMBUTOL HYDROCHLORIDE 400 MG/1
400 TABLET ORAL
Qty: 40 | Refills: 3 | Status: COMPLETED | COMMUNITY
Start: 2022-03-15 | End: 2023-01-01

## 2023-01-01 RX ORDER — CELECOXIB 100 MG/1
100 CAPSULE ORAL TWICE DAILY
Qty: 180 | Refills: 1 | Status: COMPLETED | COMMUNITY
Start: 2021-07-02 | End: 2023-01-01

## 2023-01-01 RX ORDER — LORATADINE 10 MG
17 TABLET,DISINTEGRATING ORAL
Qty: 2 | Refills: 2 | Status: ACTIVE | COMMUNITY
Start: 2023-01-01 | End: 1900-01-01

## 2023-01-01 RX ORDER — TIOTROPIUM BROMIDE INHALATION SPRAY 1.56 UG/1
1.25 SPRAY, METERED RESPIRATORY (INHALATION) DAILY
Qty: 1 | Refills: 3 | Status: ACTIVE | COMMUNITY
Start: 2023-01-01 | End: 1900-01-01

## 2023-01-01 RX ORDER — ASCORBIC ACID 125 MG
5000 TABLET,CHEWABLE ORAL
Qty: 90 | Refills: 1 | Status: ACTIVE | COMMUNITY
Start: 2023-01-01 | End: 1900-01-01

## 2023-01-01 RX ORDER — BUDESONIDE AND FORMOTEROL FUMARATE DIHYDRATE 160; 4.5 UG/1; UG/1
160-4.5 AEROSOL RESPIRATORY (INHALATION) TWICE DAILY
Qty: 1 | Refills: 2 | Status: ACTIVE | COMMUNITY
Start: 2023-01-01 | End: 1900-01-01

## 2023-01-01 RX ORDER — TAMSULOSIN HYDROCHLORIDE 0.4 MG/1
0.4 CAPSULE ORAL
Qty: 90 | Refills: 2 | Status: ACTIVE | COMMUNITY
Start: 2017-12-11 | End: 1900-01-01

## 2023-01-01 RX ORDER — LATANOPROST/PF 0.005 %
0.01 DROPS OPHTHALMIC (EYE)
Refills: 0 | Status: ACTIVE | COMMUNITY
Start: 2023-01-01

## 2023-02-06 ENCOUNTER — RX RENEWAL (OUTPATIENT)
Age: 68
End: 2023-02-06

## 2023-02-07 RX ORDER — ATORVASTATIN CALCIUM 10 MG/1
10 TABLET, FILM COATED ORAL
Qty: 90 | Refills: 3 | Status: ACTIVE | COMMUNITY
Start: 2018-11-09 | End: 1900-01-01

## 2023-02-15 ENCOUNTER — APPOINTMENT (OUTPATIENT)
Dept: FAMILY MEDICINE | Facility: CLINIC | Age: 68
End: 2023-02-15
Payer: MEDICAID

## 2023-02-15 ENCOUNTER — INPATIENT (INPATIENT)
Facility: HOSPITAL | Age: 68
LOS: 5 days | Discharge: HOME | End: 2023-02-21
Attending: STUDENT IN AN ORGANIZED HEALTH CARE EDUCATION/TRAINING PROGRAM | Admitting: STUDENT IN AN ORGANIZED HEALTH CARE EDUCATION/TRAINING PROGRAM
Payer: MEDICAID

## 2023-02-15 VITALS
WEIGHT: 113 LBS | HEART RATE: 71 BPM | HEIGHT: 64 IN | TEMPERATURE: 97.8 F | BODY MASS INDEX: 19.29 KG/M2 | OXYGEN SATURATION: 86 % | SYSTOLIC BLOOD PRESSURE: 165 MMHG | DIASTOLIC BLOOD PRESSURE: 98 MMHG

## 2023-02-15 VITALS
TEMPERATURE: 99 F | RESPIRATION RATE: 18 BRPM | SYSTOLIC BLOOD PRESSURE: 171 MMHG | HEART RATE: 66 BPM | OXYGEN SATURATION: 93 % | DIASTOLIC BLOOD PRESSURE: 101 MMHG

## 2023-02-15 DIAGNOSIS — R09.02 HYPOXEMIA: ICD-10-CM

## 2023-02-15 DIAGNOSIS — Z98.41 CATARACT EXTRACTION STATUS, RIGHT EYE: Chronic | ICD-10-CM

## 2023-02-15 DIAGNOSIS — Z98.890 OTHER SPECIFIED POSTPROCEDURAL STATES: Chronic | ICD-10-CM

## 2023-02-15 LAB
ALBUMIN SERPL ELPH-MCNC: 4.3 G/DL — SIGNIFICANT CHANGE UP (ref 3.3–5)
ALP SERPL-CCNC: 89 U/L — SIGNIFICANT CHANGE UP (ref 40–120)
ALT FLD-CCNC: 11 U/L — SIGNIFICANT CHANGE UP (ref 4–41)
ANION GAP SERPL CALC-SCNC: 8 MMOL/L — SIGNIFICANT CHANGE UP (ref 7–14)
AST SERPL-CCNC: 18 U/L — SIGNIFICANT CHANGE UP (ref 4–40)
B PERT DNA SPEC QL NAA+PROBE: SIGNIFICANT CHANGE UP
B PERT+PARAPERT DNA PNL SPEC NAA+PROBE: SIGNIFICANT CHANGE UP
BASE EXCESS BLDV CALC-SCNC: 1.2 MMOL/L — SIGNIFICANT CHANGE UP (ref -2–3)
BASOPHILS # BLD AUTO: 0.04 K/UL — SIGNIFICANT CHANGE UP (ref 0–0.2)
BASOPHILS NFR BLD AUTO: 0.5 % — SIGNIFICANT CHANGE UP (ref 0–2)
BILIRUB SERPL-MCNC: 0.6 MG/DL — SIGNIFICANT CHANGE UP (ref 0.2–1.2)
BLOOD GAS VENOUS COMPREHENSIVE RESULT: SIGNIFICANT CHANGE UP
BORDETELLA PARAPERTUSSIS (RAPRVP): SIGNIFICANT CHANGE UP
BUN SERPL-MCNC: 9 MG/DL — SIGNIFICANT CHANGE UP (ref 7–23)
C PNEUM DNA SPEC QL NAA+PROBE: SIGNIFICANT CHANGE UP
CALCIUM SERPL-MCNC: 8.8 MG/DL — SIGNIFICANT CHANGE UP (ref 8.4–10.5)
CHLORIDE BLDV-SCNC: 106 MMOL/L — SIGNIFICANT CHANGE UP (ref 96–108)
CHLORIDE SERPL-SCNC: 103 MMOL/L — SIGNIFICANT CHANGE UP (ref 98–107)
CO2 BLDV-SCNC: 29.7 MMOL/L — HIGH (ref 22–26)
CO2 SERPL-SCNC: 25 MMOL/L — SIGNIFICANT CHANGE UP (ref 22–31)
CREAT SERPL-MCNC: 0.9 MG/DL — SIGNIFICANT CHANGE UP (ref 0.5–1.3)
D DIMER BLD IA.RAPID-MCNC: 496 NG/ML DDU — HIGH
EGFR: 92 ML/MIN/1.73M2 — SIGNIFICANT CHANGE UP
EOSINOPHIL # BLD AUTO: 0.26 K/UL — SIGNIFICANT CHANGE UP (ref 0–0.5)
EOSINOPHIL NFR BLD AUTO: 3.4 % — SIGNIFICANT CHANGE UP (ref 0–6)
FLUAV SUBTYP SPEC NAA+PROBE: SIGNIFICANT CHANGE UP
FLUBV RNA SPEC QL NAA+PROBE: SIGNIFICANT CHANGE UP
GAS PNL BLDV: 136 MMOL/L — SIGNIFICANT CHANGE UP (ref 136–145)
GAS PNL BLDV: SIGNIFICANT CHANGE UP
GLUCOSE BLDV-MCNC: 111 MG/DL — HIGH (ref 70–99)
GLUCOSE SERPL-MCNC: 107 MG/DL — HIGH (ref 70–99)
HADV DNA SPEC QL NAA+PROBE: SIGNIFICANT CHANGE UP
HCO3 BLDV-SCNC: 28 MMOL/L — SIGNIFICANT CHANGE UP (ref 22–29)
HCOV 229E RNA SPEC QL NAA+PROBE: SIGNIFICANT CHANGE UP
HCOV HKU1 RNA SPEC QL NAA+PROBE: SIGNIFICANT CHANGE UP
HCOV NL63 RNA SPEC QL NAA+PROBE: SIGNIFICANT CHANGE UP
HCOV OC43 RNA SPEC QL NAA+PROBE: SIGNIFICANT CHANGE UP
HCT VFR BLD CALC: 45.3 % — SIGNIFICANT CHANGE UP (ref 39–50)
HCT VFR BLDA CALC: 46 % — SIGNIFICANT CHANGE UP (ref 39–51)
HGB BLD CALC-MCNC: 15.4 G/DL — SIGNIFICANT CHANGE UP (ref 12.6–17.4)
HGB BLD-MCNC: 15.1 G/DL — SIGNIFICANT CHANGE UP (ref 13–17)
HIV 1+2 AB+HIV1 P24 AG SERPL QL IA: SIGNIFICANT CHANGE UP
HMPV RNA SPEC QL NAA+PROBE: SIGNIFICANT CHANGE UP
HPIV1 RNA SPEC QL NAA+PROBE: SIGNIFICANT CHANGE UP
HPIV2 RNA SPEC QL NAA+PROBE: SIGNIFICANT CHANGE UP
HPIV3 RNA SPEC QL NAA+PROBE: SIGNIFICANT CHANGE UP
HPIV4 RNA SPEC QL NAA+PROBE: SIGNIFICANT CHANGE UP
IANC: 4.51 K/UL — SIGNIFICANT CHANGE UP (ref 1.8–7.4)
IMM GRANULOCYTES NFR BLD AUTO: 0.3 % — SIGNIFICANT CHANGE UP (ref 0–0.9)
LACTATE BLDV-MCNC: 1 MMOL/L — SIGNIFICANT CHANGE UP (ref 0.5–2)
LYMPHOCYTES # BLD AUTO: 2.21 K/UL — SIGNIFICANT CHANGE UP (ref 1–3.3)
LYMPHOCYTES # BLD AUTO: 29.1 % — SIGNIFICANT CHANGE UP (ref 13–44)
M PNEUMO DNA SPEC QL NAA+PROBE: SIGNIFICANT CHANGE UP
MCHC RBC-ENTMCNC: 32.8 PG — SIGNIFICANT CHANGE UP (ref 27–34)
MCHC RBC-ENTMCNC: 33.3 GM/DL — SIGNIFICANT CHANGE UP (ref 32–36)
MCV RBC AUTO: 98.3 FL — SIGNIFICANT CHANGE UP (ref 80–100)
MONOCYTES # BLD AUTO: 0.56 K/UL — SIGNIFICANT CHANGE UP (ref 0–0.9)
MONOCYTES NFR BLD AUTO: 7.4 % — SIGNIFICANT CHANGE UP (ref 2–14)
NEUTROPHILS # BLD AUTO: 4.51 K/UL — SIGNIFICANT CHANGE UP (ref 1.8–7.4)
NEUTROPHILS NFR BLD AUTO: 59.3 % — SIGNIFICANT CHANGE UP (ref 43–77)
NRBC # BLD: 0 /100 WBCS — SIGNIFICANT CHANGE UP (ref 0–0)
NRBC # FLD: 0 K/UL — SIGNIFICANT CHANGE UP (ref 0–0)
NT-PROBNP SERPL-SCNC: 180 PG/ML — SIGNIFICANT CHANGE UP
PCO2 BLDV: 52 MMHG — SIGNIFICANT CHANGE UP (ref 42–55)
PH BLDV: 7.34 — SIGNIFICANT CHANGE UP (ref 7.32–7.43)
PLATELET # BLD AUTO: 224 K/UL — SIGNIFICANT CHANGE UP (ref 150–400)
PO2 BLDV: 23 MMHG — LOW (ref 25–45)
POTASSIUM BLDV-SCNC: 3.6 MMOL/L — SIGNIFICANT CHANGE UP (ref 3.5–5.1)
POTASSIUM SERPL-MCNC: 3.7 MMOL/L — SIGNIFICANT CHANGE UP (ref 3.5–5.3)
POTASSIUM SERPL-SCNC: 3.7 MMOL/L — SIGNIFICANT CHANGE UP (ref 3.5–5.3)
PROT SERPL-MCNC: 7.3 G/DL — SIGNIFICANT CHANGE UP (ref 6–8.3)
RAPID RVP RESULT: SIGNIFICANT CHANGE UP
RBC # BLD: 4.61 M/UL — SIGNIFICANT CHANGE UP (ref 4.2–5.8)
RBC # FLD: 13.7 % — SIGNIFICANT CHANGE UP (ref 10.3–14.5)
RSV RNA SPEC QL NAA+PROBE: SIGNIFICANT CHANGE UP
RV+EV RNA SPEC QL NAA+PROBE: SIGNIFICANT CHANGE UP
SAO2 % BLDV: 34 % — LOW (ref 67–88)
SARS-COV-2 RNA SPEC QL NAA+PROBE: SIGNIFICANT CHANGE UP
SODIUM SERPL-SCNC: 136 MMOL/L — SIGNIFICANT CHANGE UP (ref 135–145)
TROPONIN T, HIGH SENSITIVITY RESULT: 8 NG/L — SIGNIFICANT CHANGE UP
WBC # BLD: 7.6 K/UL — SIGNIFICANT CHANGE UP (ref 3.8–10.5)
WBC # FLD AUTO: 7.6 K/UL — SIGNIFICANT CHANGE UP (ref 3.8–10.5)

## 2023-02-15 PROCEDURE — 99215 OFFICE O/P EST HI 40 MIN: CPT

## 2023-02-15 PROCEDURE — 99285 EMERGENCY DEPT VISIT HI MDM: CPT

## 2023-02-15 PROCEDURE — 71046 X-RAY EXAM CHEST 2 VIEWS: CPT | Mod: 26

## 2023-02-15 NOTE — ED PROVIDER NOTE - PHYSICAL EXAMINATION
VITALS:   T(C): 37.1 (02-15-23 @ 17:13), Max: 37.1 (02-15-23 @ 17:13)  HR: 66 (02-15-23 @ 17:13) (66 - 66)  BP: 171/101 (02-15-23 @ 17:13) (171/101 - 171/101)  RR: 18 (02-15-23 @ 17:13) (18 - 18)  SpO2: 93% (02-15-23 @ 17:13) (93% - 93%)    GENERAL: NAD, lying in bed comfortably  HEAD:  Atraumatic, Normocephalic  EYES: EOMI conjunctiva and sclera clear  ENT: Moist mucous membranes. Many missing teeth  NECK: +cervical lymphadenopathy +JVD  CHEST/LUNG: +crackles b/l lung bases. 96%O2 on 2L NC. Unlabored respirations, non-tachypneic  HEART: Regular rate and rhythm; No murmurs, rubs, or gallops  ABDOMEN: Soft, nontender, nondistended  EXTREMITIES:  No clubbing, cyanosis, or edema. No erythema  NERVOUS SYSTEM:  A&Ox3, no focal deficits   SKIN: No rashes or lesions

## 2023-02-15 NOTE — PHYSICAL EXAM
[No Acute Distress] : no acute distress [Ill-Appearing] : ill-appearing [Normal] : normal rate, regular rhythm, normal S1 and S2 and no murmur heard

## 2023-02-15 NOTE — ED PROVIDER NOTE - CLINICAL SUMMARY MEDICAL DECISION MAKING FREE TEXT BOX
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  Patient was reportedly in PCP office today for routine checkup, and was found to have hypoxia to 85 on room air. Patient reports some chest pressure and dyspnea on exertion, over the last year, which is gradually progressing.  Chest pain is nonpleuritic, nonradiating. Denies any leg swelling, fevers, vomiting, or recent hemoptysis.    Vital signs show hypertension 171/101 and 93% O2 sat on 2 L oxygen.  Physical exam shows bilateral crackles in lower lung bases, JVD, clubbing in fingernails/toenails.  + Cervical lymphadenopathy. Normal heart sounds.  No abdominal tenderness, no rash, no lower extremity swelling.  Supicion for MAC pneumonia versus TB versus lung malignancy.  Possible new onset heart failure, given crackles bilaterally and JVD.  Low suspicion for PE at this time, given that patient is not tachypneic, not tachycardic, having no pleuritic chest pain, has no lower extremity swelling/pain.  Consider also undiagnosed COPD.  1: CBC, CMP, VBG, RVP, BNP, troponin, acid-fast sputum cultures, x-ray, EKG, cardiac monitor, pulse ox, nasal cannula

## 2023-02-15 NOTE — ED PROVIDER NOTE - OBJECTIVE STATEMENT
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  Patient was reportedly in PCP office today for routine checkup, and was found to have hypoxia to 85 on room air.  Per triage note, patient has known TB for the last 1 year, but has not been taking any medications for it.  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  Patient was prescribed several medications for treatment however did not take it because he plans to go back to [facility] and pursue treatments there.  However, patient then developed COVID pneumonia, and did not return to [facility] or pursue treatment.  Per daughter, patient has had maintain a stable weight this year has not had progression, has not had hemoptysis, or night sweats.  However he has been having a chronic dry cough over the last year and shortness of breath with exertion.  Patient reports some chest pressure and dyspnea on exertion, over the last year, which is gradually progressing.  Chest pain is nonpleuritic, nonradiating. Denies any leg swelling, fevers, vomiting, or hemoptysis. 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  Patient was reportedly in PCP office today for routine checkup, and was found to have hypoxia to 85 on room air.  Per triage note, patient has known TB for the last 1 year, but has not been taking any medications for it.  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  Patient was prescribed several medications for treatment however did not take it because he plans to go back to [facility] and pursue treatments there.  However, patient then developed COVID pneumonia, and did not return to [facility] or pursue treatment.  Per daughter, patient has had maintain a stable weight this year has not had progression, has not had hemoptysis, or night sweats.  However he has been having a chronic dry cough over the last year and shortness of breath with exertion, which has gradually progressed.  Patient reports some chest pressure in mid sternal and right chest that began after starting O2 with nasal cannula at PCP office..  Chest pain is nonpleuritic, nonradiating. Denies any leg swelling, fevers, vomiting, or recent hemoptysis. 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  Patient was reportedly in PCP office today for routine checkup, and was found to have hypoxia to 85 on room air.  Per triage note, patient has known TB for the last 1 year, but has not been taking any medications for it.  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  Patient was prescribed several medications for treatment however did not take it because he plans to go back to [facility] and pursue treatments there.  However, patient then developed COVID pneumonia, and did not return to [facility] or pursue treatment.  Per daughter, patient has had maintain a stable weight this year has not had progression, has not had hemoptysis, or night sweats.  However he has been having a chronic dry cough over the last year and shortness of breath with exertion, which has gradually progressed.  Patient reports some chest pressure in mid sternal and right chest that began after starting O2 with nasal cannula at PCP office..  Chest pain is nonpleuritic, nonradiating. Denies any leg swelling, fevers, vomiting, or recent hemoptysis.    Per triage note, pt has known TB, but per daughter, pt had MAC PNA.

## 2023-02-15 NOTE — HISTORY OF PRESENT ILLNESS
[Family Member] : family member [FreeTextEntry1] : med refills [de-identified] : 68 yo M with GABY, insomnia, chronic pain secondary to lumbar radiculopathy dx last year presents for med refills. He was dx with GABY in March last year-- with O2 92 following cough with hemoptysis. He was prescribed rifampin and ethambutol by pulm back then. Daughter reports he has not taken any of the medication since then. He decided he was too weak to take the medication, so he did not. He has been feeling SOB since last March with pain, insomnia, extreme fatigue, no appetite. Daughter reports SOB has improved, he is planning on going [place] and [place]. O2 85% here, improved to 92% on 3L O2. \par \par \par

## 2023-02-15 NOTE — ED PROVIDER NOTE - PROGRESS NOTE DETAILS
Adilene De Paz, PGY-1: Pt was desatting to 89 on 2L NC. Per daughter at bedside, this started shortly after patient sat himself up in bed. Pt not tachypneic; breathing comfortably. O2 on NC was increased to 4L and bed repositioned so that patient could have back support instead of holding himself up. Pt O2 increased to 94% after these adjustments. Makenzie Raphael MD (PGY-1 EM): Patient's saturation was in the high 80s on 4 L nasal cannula so patient was put on a nonrebreather.  Patient saturation continued to remain low so patient was placed on a high flow.  Patient is currently stable on high flow

## 2023-02-15 NOTE — ED PROVIDER NOTE - NSICDXPASTMEDICALHX_GEN_ALL_CORE_FT
PAST MEDICAL HISTORY:  Anxiety     Benign prostatic hypertrophy     Bruxism     Cigarette smoker     HTN (hypertension)     TIA (transient ischemic attack)

## 2023-02-15 NOTE — ED ADULT TRIAGE NOTE - CHIEF COMPLAINT QUOTE
Pt arrives via EMS for low oxygen saturation. Per EMS, pt went to pulmonologist today and was found to be hypoxic to 85% on room air. Pt currently has active TB, not taking any medications for over 1 year. Placed on 3L. PMHx TB, HTN

## 2023-02-15 NOTE — ED ADULT NURSE NOTE - OBJECTIVE STATEMENT
Patient presents to ED from home via EMS for low oxygen saturation. As per triage note, EMS stated that pulmonologist found that patient was saturating 85% on room air, and has active TB for over 1 year, not on any medications for it. As per family at bedside, patient has been having dyspnea on exertion at home on and off and it has worsened over the past week. Patient is AA&Ox4, in no acute distress, calm, cooperative. Respirations even, unlabored on 3 L of O2 via NC. Denies CP, dizziness, N/V, diarrhea, constipation. Hx HTN. Placed on cardiac monitor showing NSR. 20G IV placed left AC, labs drawn and sent.

## 2023-02-15 NOTE — ED PROVIDER NOTE - ATTENDING CONTRIBUTION TO CARE
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  The patient is traveling back to [facility] soon, went to PCP office for medication refill was found to have a saturation in the mid 80s on room air.  Patient reports some exertional dyspnea and chest tightness, not at rest, currently does not feel the symptoms, did not go to the PCP for this reason.  Patient denies fever, chills, cough, leg swelling.  No history of DVT or PE.    Vitals:   Reviewed    Exam:    GEN:  Non-toxic appearing, non-distressed, speaking full sentences, non-diaphoretic, AAOx3  HEENT:  NCAT, neck supple, EOMI, PERRLA, sclera anicteric, no conjunctival pallor or injection, no stridor, normal voice, no tonsillar exudate, uvula midline  CV:  regular rhythm and rate, s1/s2 audible, no murmurs, rubs or gallops, peripheral pulses 2+ and symmetric  PULM:  non-labored respirations, crackles at b/l lung bases   ABD:  non distended, non-tender, no rebound, no guarding, negative Giles's sign, bowel sounds normal, no cvat  MSK:  no gross deformity, non-tender extremities and joints, range of motion grossly normal appearing, no extremity edema, extremities warm and well perfused   NEURO:  AAOx3, CN II-XII intact, motor 5/5 in upper and lower extremities bilaterally, sensation grossly intact in extremities and trunk, finger to nose testing wnl, no nystagmus, negative Romberg, no pronator drift, no gait deficit  SKIN:  warm, dry, no rash or vesicles     A/P:  67-year-old male past medical history of hypertension, glaucoma, 30-pack-year smoking history, 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  Patient nontoxic-appearing, lungs are crackles at bilateral bases.  O2 sat in high 80s on room air which improved with 2 L nasal cannula.  Differential diagnosis includes infectious etiology versus undiagnosed COPD however patient without wheeze or cough.  Low concern for PE overall given patient denies chest pain and shortness of breath currently.  Will send labs, chest x-ray, supportive care.  Anticipate admission due to hypoxemia.

## 2023-02-16 DIAGNOSIS — Z29.9 ENCOUNTER FOR PROPHYLACTIC MEASURES, UNSPECIFIED: ICD-10-CM

## 2023-02-16 DIAGNOSIS — N40.0 BENIGN PROSTATIC HYPERPLASIA WITHOUT LOWER URINARY TRACT SYMPTOMS: ICD-10-CM

## 2023-02-16 DIAGNOSIS — E78.5 HYPERLIPIDEMIA, UNSPECIFIED: ICD-10-CM

## 2023-02-16 DIAGNOSIS — J96.01 ACUTE RESPIRATORY FAILURE WITH HYPOXIA: ICD-10-CM

## 2023-02-16 DIAGNOSIS — I10 ESSENTIAL (PRIMARY) HYPERTENSION: ICD-10-CM

## 2023-02-16 DIAGNOSIS — H40.9 UNSPECIFIED GLAUCOMA: ICD-10-CM

## 2023-02-16 DIAGNOSIS — A31.0 PULMONARY MYCOBACTERIAL INFECTION: ICD-10-CM

## 2023-02-16 DIAGNOSIS — K21.9 GASTRO-ESOPHAGEAL REFLUX DISEASE WITHOUT ESOPHAGITIS: ICD-10-CM

## 2023-02-16 DIAGNOSIS — J44.1 CHRONIC OBSTRUCTIVE PULMONARY DISEASE WITH (ACUTE) EXACERBATION: ICD-10-CM

## 2023-02-16 DIAGNOSIS — G89.29 OTHER CHRONIC PAIN: ICD-10-CM

## 2023-02-16 LAB
NIGHT BLUE STAIN TISS: SIGNIFICANT CHANGE UP
SPECIMEN SOURCE: SIGNIFICANT CHANGE UP
TROPONIN T, HIGH SENSITIVITY RESULT: 7 NG/L — SIGNIFICANT CHANGE UP

## 2023-02-16 PROCEDURE — 71275 CT ANGIOGRAPHY CHEST: CPT | Mod: 26,MA

## 2023-02-16 PROCEDURE — 76604 US EXAM CHEST: CPT | Mod: 26,GC

## 2023-02-16 PROCEDURE — 93308 TTE F-UP OR LMTD: CPT | Mod: 26,GC

## 2023-02-16 PROCEDURE — 99223 1ST HOSP IP/OBS HIGH 75: CPT | Mod: GC

## 2023-02-16 PROCEDURE — 99223 1ST HOSP IP/OBS HIGH 75: CPT

## 2023-02-16 RX ORDER — OXYBUTYNIN CHLORIDE 5 MG
5 TABLET ORAL
Refills: 0 | Status: DISCONTINUED | OUTPATIENT
Start: 2023-02-16 | End: 2023-02-17

## 2023-02-16 RX ORDER — DORZOLAMIDE HYDROCHLORIDE TIMOLOL MALEATE 20; 5 MG/ML; MG/ML
1 SOLUTION/ DROPS OPHTHALMIC
Refills: 0 | Status: DISCONTINUED | OUTPATIENT
Start: 2023-02-16 | End: 2023-02-21

## 2023-02-16 RX ORDER — ATORVASTATIN CALCIUM 80 MG/1
10 TABLET, FILM COATED ORAL AT BEDTIME
Refills: 0 | Status: DISCONTINUED | OUTPATIENT
Start: 2023-02-16 | End: 2023-02-21

## 2023-02-16 RX ORDER — ENOXAPARIN SODIUM 100 MG/ML
40 INJECTION SUBCUTANEOUS EVERY 24 HOURS
Refills: 0 | Status: DISCONTINUED | OUTPATIENT
Start: 2023-02-16 | End: 2023-02-21

## 2023-02-16 RX ORDER — CYCLOBENZAPRINE HYDROCHLORIDE 10 MG/1
5 TABLET, FILM COATED ORAL THREE TIMES A DAY
Refills: 0 | Status: DISCONTINUED | OUTPATIENT
Start: 2023-02-16 | End: 2023-02-21

## 2023-02-16 RX ORDER — LOSARTAN POTASSIUM 100 MG/1
50 TABLET, FILM COATED ORAL DAILY
Refills: 0 | Status: DISCONTINUED | OUTPATIENT
Start: 2023-02-16 | End: 2023-02-21

## 2023-02-16 RX ORDER — TAMSULOSIN HYDROCHLORIDE 0.4 MG/1
0.4 CAPSULE ORAL AT BEDTIME
Refills: 0 | Status: DISCONTINUED | OUTPATIENT
Start: 2023-02-16 | End: 2023-02-21

## 2023-02-16 RX ORDER — TRAZODONE HCL 50 MG
100 TABLET ORAL AT BEDTIME
Refills: 0 | Status: DISCONTINUED | OUTPATIENT
Start: 2023-02-16 | End: 2023-02-21

## 2023-02-16 RX ORDER — FAMOTIDINE 10 MG/ML
40 INJECTION INTRAVENOUS
Refills: 0 | Status: DISCONTINUED | OUTPATIENT
Start: 2023-02-16 | End: 2023-02-21

## 2023-02-16 RX ORDER — FUROSEMIDE 40 MG
40 TABLET ORAL ONCE
Refills: 0 | Status: COMPLETED | OUTPATIENT
Start: 2023-02-16 | End: 2023-02-16

## 2023-02-16 RX ORDER — BUDESONIDE AND FORMOTEROL FUMARATE DIHYDRATE 160; 4.5 UG/1; UG/1
2 AEROSOL RESPIRATORY (INHALATION)
Refills: 0 | Status: DISCONTINUED | OUTPATIENT
Start: 2023-02-16 | End: 2023-02-21

## 2023-02-16 RX ORDER — ACETAMINOPHEN 500 MG
650 TABLET ORAL EVERY 6 HOURS
Refills: 0 | Status: DISCONTINUED | OUTPATIENT
Start: 2023-02-16 | End: 2023-02-21

## 2023-02-16 RX ORDER — IPRATROPIUM/ALBUTEROL SULFATE 18-103MCG
3 AEROSOL WITH ADAPTER (GRAM) INHALATION EVERY 6 HOURS
Refills: 0 | Status: DISCONTINUED | OUTPATIENT
Start: 2023-02-16 | End: 2023-02-18

## 2023-02-16 RX ORDER — LATANOPROST 0.05 MG/ML
1 SOLUTION/ DROPS OPHTHALMIC; TOPICAL AT BEDTIME
Refills: 0 | Status: DISCONTINUED | OUTPATIENT
Start: 2023-02-16 | End: 2023-02-21

## 2023-02-16 RX ORDER — GABAPENTIN 400 MG/1
300 CAPSULE ORAL
Refills: 0 | Status: DISCONTINUED | OUTPATIENT
Start: 2023-02-16 | End: 2023-02-21

## 2023-02-16 RX ADMIN — FAMOTIDINE 40 MILLIGRAM(S): 10 INJECTION INTRAVENOUS at 19:36

## 2023-02-16 RX ADMIN — CYCLOBENZAPRINE HYDROCHLORIDE 5 MILLIGRAM(S): 10 TABLET, FILM COATED ORAL at 22:40

## 2023-02-16 RX ADMIN — ENOXAPARIN SODIUM 40 MILLIGRAM(S): 100 INJECTION SUBCUTANEOUS at 22:39

## 2023-02-16 RX ADMIN — GABAPENTIN 300 MILLIGRAM(S): 400 CAPSULE ORAL at 19:36

## 2023-02-16 RX ADMIN — Medication 5 MILLIGRAM(S): at 19:36

## 2023-02-16 RX ADMIN — LOSARTAN POTASSIUM 50 MILLIGRAM(S): 100 TABLET, FILM COATED ORAL at 22:39

## 2023-02-16 RX ADMIN — Medication 40 MILLIGRAM(S): at 07:11

## 2023-02-16 RX ADMIN — BUDESONIDE AND FORMOTEROL FUMARATE DIHYDRATE 2 PUFF(S): 160; 4.5 AEROSOL RESPIRATORY (INHALATION) at 22:39

## 2023-02-16 RX ADMIN — TAMSULOSIN HYDROCHLORIDE 0.4 MILLIGRAM(S): 0.4 CAPSULE ORAL at 22:40

## 2023-02-16 RX ADMIN — Medication 3 MILLILITER(S): at 19:35

## 2023-02-16 RX ADMIN — ATORVASTATIN CALCIUM 10 MILLIGRAM(S): 80 TABLET, FILM COATED ORAL at 22:39

## 2023-02-16 NOTE — ED ADULT NURSE REASSESSMENT NOTE - NS ED NURSE REASSESS COMMENT FT1
PT is resting in stretcher, easily arousable to verbal stimuli. no apparent distress noted. Found to be 88% in NC 6L when doing walking round and MD Wang made aware and high flow NC started.   will continue to monitor.

## 2023-02-16 NOTE — CONSULT NOTE ADULT - ASSESSMENT
-HFNC Fio2 weaned to 40% with spo2 in high 90s. Continue to wean. May be able to wean to low flow NC soon.     -Goal SpO2 of 90-95%. Avoid hyperoxemia as this can worsen v/q mathching and worsening hypercarbia.   -Encourage incentive spirometer  -Out of bed to chair daily.     Full consult to follow.     Dr. 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      CTA chest: no pe; no PA diameter; reflux of contrast within hepatic veins; emphysema; clear lungs; no pleural effusions; mild enlarged mediastinal/hilar LNs  POCUS with agitated saline study:  B-lines may represent interstitial disease; E/A wave reversal suggests impaired LV relaxation; Negative bubble study    #Hypoxemic respiratory failure  #COPD exacerbation  #Non-cavitary MAC  #Tobacco use disorder    RECS  -Agree with prednisone 40 mg po daily  -CW duonebs and symbicort.   -Would not treat MAC at this time. Unlikely cause of acute illness.    -No evidence of shunt or RV failure on our bedside US.  -Agree with formal TTE.  -HFNC Fio2 weaned to 40% with spo2 in high 90s. Continue to wean. May be able to wean to low flow NC soon.   -Goal SpO2 of 90-95%. Avoid hyperoxemia.  -Encourage incentive spirometer  -Out of bed to chair daily.     Dr. Eliseo Ronquillo, DO  Pulmonary and Critical Care Medicine Fellow   Available via [employer] Teams - **Preferred**  Pulmonary Spectra #[id number] ([facility]) / [id number] ([facility])  Pager:  [phone] 67-year-old male with past medical history of hypertension, BPH, glaucoma, spondylolisthesis, 30-pack-year smoking history, and MAC diagnosed last year but lost to follow up, presenting with hypoxic respiratory failure.      CTA chest: no pe; no PA diameter; reflux of contrast within hepatic veins; emphysema; clear lungs; no pleural effusions; mild enlarged mediastinal/hilar LNs  POCUS with agitated saline study:  B-lines may represent interstitial disease; E/A wave reversal suggests impaired LV relaxation; Negative bubble study    #Acute hypoxemic respiratory failure  #COPD with acute exacerbation  #Tobacco use disorder    RECS  -Agree with prednisone 40 mg po daily  -CW duonebs and symbicort.   -Would not treat MAC at this time. Unlikely cause of acute illness. No evidence of pneumonia on CT chest. History of non-compliance with meds.  -Agree with formal TTE.  -HFNC Fio2 weaned to 40% with spo2 in high 90s. Continue to wean. May be able to wean to conventional NC soon.   -Goal SpO2 of 90-95%. Avoid hyperoxemia.  -incentive spirometer  -Out of bed to chair daily.     Dr. Eliseo Ronquillo, DO  Pulmonary and Critical Care Medicine Fellow   Available via [employer] Teams - **Preferred**  Pulmonary Spectra #[id number] ([facility]) / [id number] ([facility])  Pager:  [phone]

## 2023-02-16 NOTE — CONSULT NOTE ADULT - SUBJECTIVE AND OBJECTIVE BOX
PULMONARY SERVICE INITIAL CONSULT NOTE    HPI:  67-year-old male with past medical history of hypertension, BPH, glaucoma, spondylolisthesis, 30-pack-year smoking history, and MAC pneumonia diagnosed last year but lost to follow up, presenting with hypoxic respiratory failure. history provided by daughter at bedside. Per daughter patient stopped taking medication for MAC and following with pulm as he was going to return to [facility] for further treatment. However patient had deconditioned considerably and has been unable to fly back. He gets winded easily. He has lack of appetite and suffers from nausea. He has dry cough for a year now. No fevers, chills, sputum production. Also reports generalized weakness and neuropathy with minimal improvement with medications. He is a chronic smoker and now is down to 1-2 cigarettes daily. Patient currently reports SOB, chest pain and back pain. No recent sick contact or travel. Patient is vaccinated not boosted against covid.    Patient seen and examined at bedside.     REVIEW OF SYSTEMS  CONSTITUTIONAL: No fevers. No chills  EYES/ENT: No visual changes. No discharge from eyes. No vertigo. No throat pain. No dysphagia.  NECK: No pain or stiffness or rigidity.  RESPIRATORY: + cough. No wheezing. No hemoptysis. + shortness of breath.  CARDIOVASCULAR: No chest pain. No palpitations.   GASTROINTESTINAL: No abdominal pain. No nausea. No vomiting. No hematemesis. No diarrhea. No constipation. No melena, No hematochezia.  GENITOURINARY: No dysuria. No hesitancy. No frequency. No hematuria.  NEUROLOGICAL: No numbness. No weakness. No change in speech. No fecal or urinary incontinence.   MSK: No joint swelling or erythema. No back pain.  SKIN: No itching or rashes.   PSYCH: Normal affect. Normal mood. No si. No hi.    12 point review of systems negative except for items noted above.    PAST MEDICAL & SURGICAL HISTORY:  HTN (hypertension)      Anxiety      Bruxism      Benign prostatic hypertrophy      TIA (transient ischemic attack)      Cigarette smoker      History of biopsy of bladder      S/P right cataract extraction          FAMILY HISTORY:      SOCIAL HISTORY:  Smoking Status: [x ] Current, [ ] Former, [ ] Never     MEDICATIONS:  Pulmonary:  albuterol/ipratropium for Nebulization 3 milliLiter(s) Nebulizer every 6 hours  budesonide 160 MICROgram(s)/formoterol 4.5 MICROgram(s) Inhaler 2 Puff(s) Inhalation two times a day    Antimicrobials:    Anticoagulants:  enoxaparin Injectable 40 milliGRAM(s) SubCutaneous every 24 hours    Onc:    GI/:  famotidine    Tablet 40 milliGRAM(s) Oral two times a day  oxybutynin 5 milliGRAM(s) Oral two times a day  tamsulosin 0.4 milliGRAM(s) Oral at bedtime    Endocrine:  atorvastatin 10 milliGRAM(s) Oral at bedtime  predniSONE   Tablet 40 milliGRAM(s) Oral daily    Cardiac:  losartan 50 milliGRAM(s) Oral daily    Other Medications:  acetaminophen     Tablet .. 650 milliGRAM(s) Oral every 6 hours PRN  cyclobenzaprine 5 milliGRAM(s) Oral three times a day PRN  dorzolamide 2%/timolol 0.5% Ophthalmic Solution 1 Drop(s) Both EYES two times a day  gabapentin 300 milliGRAM(s) Oral two times a day  latanoprost 0.005% Ophthalmic Solution 1 Drop(s) Both EYES at bedtime  traZODone 100 milliGRAM(s) Oral at bedtime      Allergies    No Known Allergies    Intolerances        PHYSICAL EXAM  Vital Signs Last 24 Hrs  T(C): 36.7 (16 Feb 2023 11:40), Max: 36.7 (16 Feb 2023 09:30)  T(F): 98.1 (16 Feb 2023 11:40), Max: 98.1 (16 Feb 2023 09:30)  HR: 72 (16 Feb 2023 11:40) (64 - 76)  BP: 145/90 (16 Feb 2023 11:40) (145/90 - 197/104)  BP(mean): --  RR: 19 (16 Feb 2023 17:33) (16 - 19)  SpO2: 98% (16 Feb 2023 17:33) (96% - 100%)    Parameters below as of 16 Feb 2023 17:33  Patient On (Oxygen Delivery Method): nasal cannula, high flow  O2 Flow (L/min): 40  O2 Concentration (%): 50        CONSTITUTIONAL: No acute distress.   HEENT:  Conjunctiva clear B/L.  Moist oral mucosa.   Cardiovascular: RRR with no murmurs. No JVD noted. No lower extremity edema B/L. Extremities are warm and well perfused.    Respiratory: Diffuse rales. Trace wheeze. No rhonchi. No accessory muscle use.   Gastrointestinal:  Soft, nontender. Non-distended. Non-rigid.    Neurologic:  Alert and awake. Moving all extremities. Following commands.    Skin:  No gross rashes notes.      LABS:      CBC Full  -  ( 15 Feb 2023 18:48 )  WBC Count : 7.60 K/uL  RBC Count : 4.61 M/uL  Hemoglobin : 15.1 g/dL  Hematocrit : 45.3 %  Platelet Count - Automated : 224 K/uL  Mean Cell Volume : 98.3 fL  Mean Cell Hemoglobin : 32.8 pg  Mean Cell Hemoglobin Concentration : 33.3 gm/dL  Auto Neutrophil # : 4.51 K/uL  Auto Lymphocyte # : 2.21 K/uL  Auto Monocyte # : 0.56 K/uL  Auto Eosinophil # : 0.26 K/uL  Auto Basophil # : 0.04 K/uL  Auto Neutrophil % : 59.3 %  Auto Lymphocyte % : 29.1 %  Auto Monocyte % : 7.4 %  Auto Eosinophil % : 3.4 %  Auto Basophil % : 0.5 %    02-15    136  |  103  |  9   ----------------------------<  107<H>  3.7   |  25  |  0.90    Ca    8.8      15 Feb 2023 18:48    TPro  7.3  /  Alb  4.3  /  TBili  0.6  /  DBili  x   /  AST  18  /  ALT  11  /  AlkPhos  89  02-15                  PULMONARY SERVICE INITIAL CONSULT NOTE    HPI:  67 year old male, active smoker, with medical history of hypertension, BPH, glaucoma, spondylolisthesis, and MAC infection (never treated) diagnosed last year but lost to follow up, presents from his PCP with hypoxemia. History provided by his daughter at bedside. Per daughter patient stopped taking medication for MAC and was not following with outpatient pulmonary because he was going to return to [facility] and preferred to receive care over there. However patient had deconditioned considerably and has been unable to fly back. He gets winded easily, particularly with exertion. He has lack of appetite and often complains of nausea. He has dry cough for a year now. Cough is worse now and slightly productive with white phlegm. Denies fevers, chills. Had prior history of hemoptysis but denies at this time. Also reports generalized weakness and neuropathy with minimal improvement with medications. He has long time history of smoking. Has smoked 1 PPD for > 30 years. Now down to 1-2 cigarettes daily. Patient currently reports shortness of breath. No recent sick contact or travel. Patient is vaccinated not boosted against COVID. Does not use inhalers at home or oxygen.      Patient seen and examined at bedside. Appears comfortable and in no distress. He is saturating well on HFNC.    REVIEW OF SYSTEMS  CONSTITUTIONAL: No fevers. No chills  EYES/ENT: No visual changes. No discharge from eyes. No vertigo. No throat pain. No dysphagia.  NECK: No pain or stiffness or rigidity.  RESPIRATORY: + cough. + shortness of breath. No wheezing. No hemoptysis.   CARDIOVASCULAR: No chest pain. No palpitations.   GASTROINTESTINAL: No abdominal pain. No nausea. No vomiting. No hematemesis. No diarrhea. No constipation. No melena, No hematochezia.  GENITOURINARY: No dysuria. No hesitancy. No frequency. No hematuria.  NEUROLOGICAL: No numbness. No weakness. No change in speech. No fecal or urinary incontinence.   MSK: No joint swelling or erythema. No back pain.  SKIN: No itching or rashes.   PSYCH: Normal affect. Normal mood. No si. No hi.    12 point review of systems negative except for items noted above.    PAST MEDICAL & SURGICAL HISTORY:  HTN (hypertension)      Anxiety      Bruxism      Benign prostatic hypertrophy      TIA (transient ischemic attack)      Cigarette smoker      History of biopsy of bladder      S/P right cataract extraction          FAMILY HISTORY:  Unable to obtain    SOCIAL HISTORY:  Smoking Status: [x ] Current, [ ] Former, [ ] Never  No drug or alcohol use history     MEDICATIONS:  Pulmonary:  albuterol/ipratropium for Nebulization 3 milliLiter(s) Nebulizer every 6 hours  budesonide 160 MICROgram(s)/formoterol 4.5 MICROgram(s) Inhaler 2 Puff(s) Inhalation two times a day    Antimicrobials:    Anticoagulants:  enoxaparin Injectable 40 milliGRAM(s) SubCutaneous every 24 hours    Onc:    GI/:  famotidine    Tablet 40 milliGRAM(s) Oral two times a day  oxybutynin 5 milliGRAM(s) Oral two times a day  tamsulosin 0.4 milliGRAM(s) Oral at bedtime    Endocrine:  atorvastatin 10 milliGRAM(s) Oral at bedtime  predniSONE   Tablet 40 milliGRAM(s) Oral daily    Cardiac:  losartan 50 milliGRAM(s) Oral daily    Other Medications:  acetaminophen     Tablet .. 650 milliGRAM(s) Oral every 6 hours PRN  cyclobenzaprine 5 milliGRAM(s) Oral three times a day PRN  dorzolamide 2%/timolol 0.5% Ophthalmic Solution 1 Drop(s) Both EYES two times a day  gabapentin 300 milliGRAM(s) Oral two times a day  latanoprost 0.005% Ophthalmic Solution 1 Drop(s) Both EYES at bedtime  traZODone 100 milliGRAM(s) Oral at bedtime      Allergies    No Known Allergies    Intolerances        PHYSICAL EXAM  Vital Signs Last 24 Hrs  T(C): 36.7 (16 Feb 2023 11:40), Max: 36.7 (16 Feb 2023 09:30)  T(F): 98.1 (16 Feb 2023 11:40), Max: 98.1 (16 Feb 2023 09:30)  HR: 72 (16 Feb 2023 11:40) (64 - 76)  BP: 145/90 (16 Feb 2023 11:40) (145/90 - 197/104)  BP(mean): --  RR: 19 (16 Feb 2023 17:33) (16 - 19)  SpO2: 98% (16 Feb 2023 17:33) (96% - 100%)    Parameters below as of 16 Feb 2023 17:33  Patient On (Oxygen Delivery Method): nasal cannula, high flow  O2 Flow (L/min): 40  O2 Concentration (%): 50        CONSTITUTIONAL: No acute distress.   HEENT:  Conjunctiva clear B/L.  Moist oral mucosa.   Cardiovascular: RRR with no murmurs. No JVD noted. No lower extremity edema B/L. Extremities are warm and well perfused.    Respiratory: Diffuse rales. Trace wheeze. No rhonchi. No accessory muscle use.   Gastrointestinal:  Soft, nontender. Non-distended. Non-rigid.    Neurologic:  Alert and awake. Moving all extremities. Following commands.    Skin:  No gross rashes notes.      LABS:      CBC Full  -  ( 15 Feb 2023 18:48 )  WBC Count : 7.60 K/uL  RBC Count : 4.61 M/uL  Hemoglobin : 15.1 g/dL  Hematocrit : 45.3 %  Platelet Count - Automated : 224 K/uL  Mean Cell Volume : 98.3 fL  Mean Cell Hemoglobin : 32.8 pg  Mean Cell Hemoglobin Concentration : 33.3 gm/dL  Auto Neutrophil # : 4.51 K/uL  Auto Lymphocyte # : 2.21 K/uL  Auto Monocyte # : 0.56 K/uL  Auto Eosinophil # : 0.26 K/uL  Auto Basophil # : 0.04 K/uL  Auto Neutrophil % : 59.3 %  Auto Lymphocyte % : 29.1 %  Auto Monocyte % : 7.4 %  Auto Eosinophil % : 3.4 %  Auto Basophil % : 0.5 %    02-15    136  |  103  |  9   ----------------------------<  107<H>  3.7   |  25  |  0.90    Ca    8.8      15 Feb 2023 18:48    TPro  7.3  /  Alb  4.3  /  TBili  0.6  /  DBili  x   /  AST  18  /  ALT  11  /  AlkPhos  89  02-15      IMAGING    CT Chest 2/16 reviewed by me:  Emphysema. No consolidations or effusions.

## 2023-02-16 NOTE — CHART NOTE - NSCHARTNOTEFT_GEN_A_CORE
: Eliseo Ronquillo    INDICATION:    PROCEDURE:  [ x] LIMITED ECHO  [ x] LIMITED CHEST    FINDINGS:  Lungs: B line predominance. No pleural effusions.   Heart: Normal LVSF. LV > RV. E/A wave reversal. No pericardial effusion. IVC indeterminant in size. Agitated saline contrast did not demonstrate shunt.     INTERPRETATION:  Interstitial disease pattern.   E/A wave reversal suggestive of impaired LV relaxation.  No intracardiac or transpulmonary shunt.     Images uploaded on [employer] : Eliseo Ronquillo    INDICATION:  Hypoxemia    PROCEDURE:  [ x] LIMITED ECHO  [ x] LIMITED CHEST    FINDINGS:  Lungs: B line predominance. No pleural effusions.   Heart: Normal LVSF. LV > RV. E/A wave reversal. No pericardial effusion. IVC indeterminant in size. Agitated saline contrast did not demonstrate shunt.     INTERPRETATION:  Interstitial disease pattern.   E/A wave reversal suggestive of impaired LV relaxation.  No intracardiac or transpulmonary shunt.     Images uploaded on [employer] : Jon Romero    INDICATION:  Acute hypoxemic respiratory failure    PROCEDURE:  [x] LIMITED ECHO  [x] LIMITED CHEST    FINDINGS:  Preserved LV systolic function. E/A wave reversal. Collapsible IVC. Agitated saline administered. Negative for shunt.  Scattered B-lines anteriorly. No pleural effusions.    INTERPRETATION:  B-lines may represent interstitial disease  E/A wave reversal suggests impaired LV relaxation  Negative bubble study    Images uploaded on [employer]      Attending Attestation:    I was present during the key portions of the procedure and immediately available during the entire procedure.    Jon Mendez MD  Attending  Pulmonary & Critical Care Medicine

## 2023-02-16 NOTE — H&P ADULT - PROBLEM SELECTOR PLAN 2
-patient with long standing smoking history. Current active smoker  -CT with emphysema. No official prior diagnosis. Not on any meds at home  -Start duonebs, symbicort, prednisone  -pulm consulted  -goal spo2 92%, deescalate 02 as tolerated

## 2023-02-16 NOTE — CONSULT NOTE ADULT - ATTENDING COMMENTS
67 year old male, active smoker, with medical history of hypertension, BPH, glaucoma, spondylolisthesis, and MAC infection (never treated) diagnosed last year but lost to follow up, presents from his PCP with hypoxemia. SpO2 improved with use of HFNC. Suspected acute hypoxemic respiratory failure secondary to COPD exacerbation.    Titrate FiO2 on HFNC to target SpO2 88 - 92%  Check TTE  Prednisone 40 mg daily. Taper based on clinical response.  Bronchodilators - Symbicort, Duonebs  Would not initiate MAC therapy at this time.

## 2023-02-16 NOTE — ED ADULT NURSE REASSESSMENT NOTE - NS ED NURSE REASSESS COMMENT FT1
Resting on stretcher. denies any pain, patient on oxygen via nasal cannula , high flow. Alert and oriented x 4, safety maintained. BP remains elevated. MD Wang made aware.

## 2023-02-16 NOTE — ED ADULT NURSE REASSESSMENT NOTE - NS ED NURSE REASSESS COMMENT FT1
pt in room, on isolation precaution, breathing on High-flow O2 at 40% and 40L/min, awake and alert, does not appear to be in any distress at this time. noted to have eaten dinner, and tolerated PO. sat at 95% at this time.

## 2023-02-16 NOTE — H&P ADULT - HISTORY OF PRESENT ILLNESS
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 history provided by daughter at bedside. Per daughter patient stopped taking medication for MAC and following with pulm as he was going to return to [facility] for further treatment. However patient had deconditioned considerably and has been unable to fly back. He gets winded easily. He has lack of appetite and suffers from nausea. He has dry cough for a year now. No fevers, chills, sputum production. Also reports generalized weakness and neuropathy with minimal improvement with medications. He is a chronic smoker and now is down to 1-2 cigarettes daily. Patient currently reports SOB, chest pain and back pain. No recent sick contact or travel. Patient is vaccinated not boosted against covid.   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 history provided by daughter at bedside. Per daughter patient stopped taking medication for MAC and following with pulm as he was going to return to [facility] for further treatment. However patient had deconditioned considerably and has been unable to fly back. He gets winded easily. He has lack of appetite and suffers from nausea. He has dry cough for a year now. No fevers, chills, sputum production. Also reports generalized weakness and neuropathy with minimal improvement with medications. He is a chronic smoker and now is down to 1-2 cigarettes daily. Patient currently reports SOB, chest pain and back pain. No recent sick contact or travel. Patient is vaccinated not boosted against covid.      Vital Signs Last 24 Hrs  T(C): 36.7 (16 Feb 2023 11:40), Max: 37.1 (15 Feb 2023 17:13)  T(F): 98.1 (16 Feb 2023 11:40), Max: 98.7 (15 Feb 2023 17:13)  HR: 72 (16 Feb 2023 11:40) (64 - 76)  BP: 145/90 (16 Feb 2023 11:40) (145/90 - 197/104)  BP(mean): --  RR: 16 (16 Feb 2023 11:40) (16 - 19)  SpO2: 98% (16 Feb 2023 11:40) (93% - 100%)    Parameters below as of 16 Feb 2023 11:40  Patient On (Oxygen Delivery Method): nasal cannula, high flow

## 2023-02-16 NOTE — H&P ADULT - PROBLEM SELECTOR PLAN 6
-reports h/o back pain with spondylolisthesis and generalized neuropathy  -at home takes gabapentin 300bid and PRN flexeril  -continue with home regimen  -trazadone for sleep  -PT cheri

## 2023-02-16 NOTE — H&P ADULT - PROBLEM SELECTOR PLAN 3
-h/o MAC infection.  -was seen by pulm as outpatient and started on treatment. however patient did not finish regimen and lost to follow up  -CT with resolving upper lobe opacities  -pulm consulted to see if treatment needs to be re-initiated

## 2023-02-16 NOTE — H&P ADULT - ASSESSMENT
67-year-old male with past medical history of hypertension, BPH, glaucoma, spondylolisthesis, 30-pack-year smoking history, and MAC pneumonia diagnosed last year but lost to follow up, presenting with hypoxic respiratory failure

## 2023-02-16 NOTE — ED ADULT NURSE REASSESSMENT NOTE - NS ED NURSE REASSESS COMMENT FT1
Resting comfortably on stretcher. Tolerating high flow O2 via nasal cannula. Denies any pain, not in acute distress. Alert and oriented x 4, safety maintained. MD made aware of BP. Patient for Inj Fr[place].

## 2023-02-16 NOTE — H&P ADULT - PROBLEM SELECTOR PLAN 1
-patient with hypoxia to 85 on RA at PCP office  -was noted to be 89% on 6l NC in ED and was transitioned to high flow NC  -titrate to goal spo2 92%  -CTA negative for PE, focal consolidation to suggest PNA  -s/p lasix 40 in ED, does not appear grossly overloaded. Assess need daily  -No viral etiology - RVp negative  -CT with emphysema. Possible pulm HTN? patient noted to have clubbing. TTE ordered  -Pulm consulted   -start duonebs ATC, Symbicort and prednisone for possible COPD exacerbation

## 2023-02-17 DIAGNOSIS — G93.40 ENCEPHALOPATHY, UNSPECIFIED: ICD-10-CM

## 2023-02-17 DIAGNOSIS — G93.41 METABOLIC ENCEPHALOPATHY: ICD-10-CM

## 2023-02-17 LAB
A1C WITH ESTIMATED AVERAGE GLUCOSE RESULT: 6.5 % — HIGH (ref 4–5.6)
ALBUMIN SERPL ELPH-MCNC: 4.8 G/DL — SIGNIFICANT CHANGE UP (ref 3.3–5)
ALP SERPL-CCNC: 107 U/L — SIGNIFICANT CHANGE UP (ref 40–120)
ALT FLD-CCNC: 20 U/L — SIGNIFICANT CHANGE UP (ref 4–41)
ANION GAP SERPL CALC-SCNC: 12 MMOL/L — SIGNIFICANT CHANGE UP (ref 7–14)
AST SERPL-CCNC: 44 U/L — HIGH (ref 4–40)
BILIRUB SERPL-MCNC: 0.7 MG/DL — SIGNIFICANT CHANGE UP (ref 0.2–1.2)
BUN SERPL-MCNC: 8 MG/DL — SIGNIFICANT CHANGE UP (ref 7–23)
CALCIUM SERPL-MCNC: 9.5 MG/DL — SIGNIFICANT CHANGE UP (ref 8.4–10.5)
CHLORIDE SERPL-SCNC: 98 MMOL/L — SIGNIFICANT CHANGE UP (ref 98–107)
CHOLEST SERPL-MCNC: 224 MG/DL — HIGH
CO2 SERPL-SCNC: 25 MMOL/L — SIGNIFICANT CHANGE UP (ref 22–31)
CREAT SERPL-MCNC: 0.78 MG/DL — SIGNIFICANT CHANGE UP (ref 0.5–1.3)
EGFR: 97 ML/MIN/1.73M2 — SIGNIFICANT CHANGE UP
ESTIMATED AVERAGE GLUCOSE: 140 — SIGNIFICANT CHANGE UP
GAS PNL BLDA: SIGNIFICANT CHANGE UP
GAS PNL BLDA: SIGNIFICANT CHANGE UP
GLUCOSE SERPL-MCNC: 126 MG/DL — HIGH (ref 70–99)
HCT VFR BLD CALC: 52.9 % — HIGH (ref 39–50)
HCV AB S/CO SERPL IA: 0.17 S/CO — SIGNIFICANT CHANGE UP (ref 0–0.99)
HCV AB SERPL-IMP: SIGNIFICANT CHANGE UP
HDLC SERPL-MCNC: 28 MG/DL — LOW
HGB BLD-MCNC: 17.7 G/DL — HIGH (ref 13–17)
LIPID PNL WITH DIRECT LDL SERPL: 158 MG/DL — HIGH
MCHC RBC-ENTMCNC: 32.4 PG — SIGNIFICANT CHANGE UP (ref 27–34)
MCHC RBC-ENTMCNC: 33.5 GM/DL — SIGNIFICANT CHANGE UP (ref 32–36)
MCV RBC AUTO: 96.9 FL — SIGNIFICANT CHANGE UP (ref 80–100)
NON HDL CHOLESTEROL: 196 MG/DL — HIGH
NRBC # BLD: 0 /100 WBCS — SIGNIFICANT CHANGE UP (ref 0–0)
NRBC # FLD: 0 K/UL — SIGNIFICANT CHANGE UP (ref 0–0)
PLATELET # BLD AUTO: 230 K/UL — SIGNIFICANT CHANGE UP (ref 150–400)
POTASSIUM SERPL-MCNC: 5 MMOL/L — SIGNIFICANT CHANGE UP (ref 3.5–5.3)
POTASSIUM SERPL-SCNC: 5 MMOL/L — SIGNIFICANT CHANGE UP (ref 3.5–5.3)
PROT SERPL-MCNC: 8.3 G/DL — SIGNIFICANT CHANGE UP (ref 6–8.3)
RBC # BLD: 5.46 M/UL — SIGNIFICANT CHANGE UP (ref 4.2–5.8)
RBC # FLD: 13.4 % — SIGNIFICANT CHANGE UP (ref 10.3–14.5)
SODIUM SERPL-SCNC: 135 MMOL/L — SIGNIFICANT CHANGE UP (ref 135–145)
TRIGL SERPL-MCNC: 191 MG/DL — HIGH
WBC # BLD: 8.86 K/UL — SIGNIFICANT CHANGE UP (ref 3.8–10.5)
WBC # FLD AUTO: 8.86 K/UL — SIGNIFICANT CHANGE UP (ref 3.8–10.5)

## 2023-02-17 PROCEDURE — 99233 SBSQ HOSP IP/OBS HIGH 50: CPT

## 2023-02-17 PROCEDURE — 99233 SBSQ HOSP IP/OBS HIGH 50: CPT | Mod: GC

## 2023-02-17 PROCEDURE — 70450 CT HEAD/BRAIN W/O DYE: CPT | Mod: 26

## 2023-02-17 PROCEDURE — 93306 TTE W/DOPPLER COMPLETE: CPT | Mod: 26

## 2023-02-17 RX ORDER — INFLUENZA VIRUS VACCINE 15; 15; 15; 15 UG/.5ML; UG/.5ML; UG/.5ML; UG/.5ML
0.7 SUSPENSION INTRAMUSCULAR ONCE
Refills: 0 | Status: DISCONTINUED | OUTPATIENT
Start: 2023-02-17 | End: 2023-02-21

## 2023-02-17 RX ORDER — HALOPERIDOL DECANOATE 100 MG/ML
2 INJECTION INTRAMUSCULAR ONCE
Refills: 0 | Status: DISCONTINUED | OUTPATIENT
Start: 2023-02-17 | End: 2023-02-18

## 2023-02-17 RX ORDER — HALOPERIDOL DECANOATE 100 MG/ML
0.5 INJECTION INTRAMUSCULAR ONCE
Refills: 0 | Status: COMPLETED | OUTPATIENT
Start: 2023-02-17 | End: 2023-02-17

## 2023-02-17 RX ORDER — NICOTINE POLACRILEX 2 MG
1 GUM BUCCAL DAILY
Refills: 0 | Status: DISCONTINUED | OUTPATIENT
Start: 2023-02-17 | End: 2023-02-21

## 2023-02-17 RX ORDER — HYDRALAZINE HCL 50 MG
5 TABLET ORAL ONCE
Refills: 0 | Status: COMPLETED | OUTPATIENT
Start: 2023-02-17 | End: 2023-02-17

## 2023-02-17 RX ORDER — BNT162B2 ORIGINAL AND OMICRON BA.4/BA.5 .1125; .1125 MG/2.25ML; MG/2.25ML
0.3 INJECTION, SUSPENSION INTRAMUSCULAR ONCE
Refills: 0 | Status: DISCONTINUED | OUTPATIENT
Start: 2023-02-17 | End: 2023-02-21

## 2023-02-17 RX ADMIN — FAMOTIDINE 40 MILLIGRAM(S): 10 INJECTION INTRAVENOUS at 18:29

## 2023-02-17 RX ADMIN — Medication 1 PATCH: at 18:25

## 2023-02-17 RX ADMIN — Medication 5 MILLIGRAM(S): at 05:30

## 2023-02-17 RX ADMIN — ATORVASTATIN CALCIUM 10 MILLIGRAM(S): 80 TABLET, FILM COATED ORAL at 22:30

## 2023-02-17 RX ADMIN — Medication 5 MILLIGRAM(S): at 18:50

## 2023-02-17 RX ADMIN — HALOPERIDOL DECANOATE 0.5 MILLIGRAM(S): 100 INJECTION INTRAMUSCULAR at 06:30

## 2023-02-17 RX ADMIN — DORZOLAMIDE HYDROCHLORIDE TIMOLOL MALEATE 1 DROP(S): 20; 5 SOLUTION/ DROPS OPHTHALMIC at 01:11

## 2023-02-17 RX ADMIN — DORZOLAMIDE HYDROCHLORIDE TIMOLOL MALEATE 1 DROP(S): 20; 5 SOLUTION/ DROPS OPHTHALMIC at 05:49

## 2023-02-17 RX ADMIN — Medication 100 MILLIGRAM(S): at 23:02

## 2023-02-17 RX ADMIN — Medication 40 MILLIGRAM(S): at 05:29

## 2023-02-17 RX ADMIN — FAMOTIDINE 40 MILLIGRAM(S): 10 INJECTION INTRAVENOUS at 05:28

## 2023-02-17 RX ADMIN — BUDESONIDE AND FORMOTEROL FUMARATE DIHYDRATE 2 PUFF(S): 160; 4.5 AEROSOL RESPIRATORY (INHALATION) at 22:29

## 2023-02-17 RX ADMIN — TAMSULOSIN HYDROCHLORIDE 0.4 MILLIGRAM(S): 0.4 CAPSULE ORAL at 22:29

## 2023-02-17 RX ADMIN — Medication 3 MILLILITER(S): at 03:11

## 2023-02-17 RX ADMIN — DORZOLAMIDE HYDROCHLORIDE TIMOLOL MALEATE 1 DROP(S): 20; 5 SOLUTION/ DROPS OPHTHALMIC at 18:26

## 2023-02-17 RX ADMIN — LATANOPROST 1 DROP(S): 0.05 SOLUTION/ DROPS OPHTHALMIC; TOPICAL at 01:11

## 2023-02-17 RX ADMIN — Medication 100 MILLIGRAM(S): at 03:11

## 2023-02-17 RX ADMIN — Medication 3 MILLILITER(S): at 22:30

## 2023-02-17 RX ADMIN — GABAPENTIN 300 MILLIGRAM(S): 400 CAPSULE ORAL at 18:29

## 2023-02-17 RX ADMIN — LOSARTAN POTASSIUM 50 MILLIGRAM(S): 100 TABLET, FILM COATED ORAL at 05:30

## 2023-02-17 RX ADMIN — GABAPENTIN 300 MILLIGRAM(S): 400 CAPSULE ORAL at 05:31

## 2023-02-17 RX ADMIN — LATANOPROST 1 DROP(S): 0.05 SOLUTION/ DROPS OPHTHALMIC; TOPICAL at 23:02

## 2023-02-17 RX ADMIN — ENOXAPARIN SODIUM 40 MILLIGRAM(S): 100 INJECTION SUBCUTANEOUS at 22:30

## 2023-02-17 NOTE — PROGRESS NOTE ADULT - SUBJECTIVE AND OBJECTIVE BOX
Interval Events:  Agitated this morning  Confused, delirious  Pulling at lines  Respiratory status stable  Remains on HFNC    REVIEW OF SYSTEMS:  [ ] All other systems negative  [x] Unable to assess ROS due to delirium    OBJECTIVE:  ICU Vital Signs Last 24 Hrs  T(C): 36.4 (17 Feb 2023 09:25), Max: 36.8 (16 Feb 2023 18:46)  T(F): 97.5 (17 Feb 2023 09:25), Max: 98.2 (16 Feb 2023 18:46)  HR: 71 (17 Feb 2023 10:12) (71 - 95)  BP: 120/72 (17 Feb 2023 10:12) (120/72 - 185/120)  BP(mean): --  ABP: --  ABP(mean): --  RR: 18 (17 Feb 2023 10:52) (17 - 28)  SpO2: 96% (17 Feb 2023 10:52) (88% - 98%)    O2 Parameters below as of 17 Feb 2023 10:52  Patient On (Oxygen Delivery Method): nasal cannula, high flow  O2 Flow (L/min): 40  O2 Concentration (%): 40          CAPILLARY BLOOD GLUCOSE          PHYSICAL EXAM:  General: Agitated  HEENT: NC/AT  Neck: Supple  Respiratory: No wheezing. Clear anteriorly. No use of accessory muscles.  Cardiovascular: RRR. No murmur. No edema.  Abdomen: Soft, nontender, nondistedned  Extremities: Warm. Moving x 4.  Skin: Intact  Neurological: Alert. Agitated. Pulling at lines. Believes he is in [facility] (home country).      HOSPITAL MEDICATIONS:  enoxaparin Injectable 40 milliGRAM(s) SubCutaneous every 24 hours      hydrALAZINE 5 milliGRAM(s) Oral once  losartan 50 milliGRAM(s) Oral daily    atorvastatin 10 milliGRAM(s) Oral at bedtime  predniSONE   Tablet 40 milliGRAM(s) Oral daily    albuterol/ipratropium for Nebulization 3 milliLiter(s) Nebulizer every 6 hours  budesonide 160 MICROgram(s)/formoterol 4.5 MICROgram(s) Inhaler 2 Puff(s) Inhalation two times a day    acetaminophen     Tablet .. 650 milliGRAM(s) Oral every 6 hours PRN  cyclobenzaprine 5 milliGRAM(s) Oral three times a day PRN  gabapentin 300 milliGRAM(s) Oral two times a day  haloperidol    Injectable 2 milliGRAM(s) IntraMuscular once  traZODone 100 milliGRAM(s) Oral at bedtime    famotidine    Tablet 40 milliGRAM(s) Oral two times a day      tamsulosin 0.4 milliGRAM(s) Oral at bedtime      coronavirus bivalent (EUA) Booster Vaccine (PFIZER) 0.3 milliLiter(s) IntraMuscular once  influenza  Vaccine (HIGH DOSE) 0.7 milliLiter(s) IntraMuscular once    dorzolamide 2%/timolol 0.5% Ophthalmic Solution 1 Drop(s) Both EYES two times a day  latanoprost 0.005% Ophthalmic Solution 1 Drop(s) Both EYES at bedtime    nicotine -   7 mG/24Hr(s) Patch 1 Patch Transdermal daily      LABS:                        17.7   8.86  )-----------( 230      ( 17 Feb 2023 05:00 )             52.9     Hgb Trend: 17.7<--, 15.1<--  02-17    135  |  98  |  8   ----------------------------<  126<H>  5.0   |  25  |  0.78    Ca    9.5      17 Feb 2023 05:00    TPro  8.3  /  Alb  4.8  /  TBili  0.7  /  DBili  x   /  AST  44<H>  /  ALT  20  /  AlkPhos  107  02-17    Creatinine Trend: 0.78<--, 0.90<--      Arterial Blood Gas:  02-17 @ 12:15  7.38/38/79/22/94.8/-2.2  ABG lactate: --  Arterial Blood Gas:  02-17 @ 11:22  7.36/52/34/29/53.9/2.5  ABG lactate: --    Venous Blood Gas:  02-15 @ 18:48  7.34/52/23/28/34.0  VBG Lactate: 1.0

## 2023-02-17 NOTE — PHYSICAL THERAPY INITIAL EVALUATION ADULT - ADDITIONAL COMMENTS
Pt ambulatory with a cane independent within home per daughter  Limited ambulation in community 2/2 h/o spondylolisthesis with resultant leg weakness/numbness (1-2 blocks)

## 2023-02-17 NOTE — PATIENT PROFILE ADULT - DO YOU FEEL THREATENED BY OTHERS?
34 y/o male with hx HTN, Anxiety presents to the ED c/o "A raccoon was attacking my chicken and I grabbed it and it bit my right thumb. I washed it out." no fever/ chills/ difficulty moving finger
no

## 2023-02-17 NOTE — PROGRESS NOTE ADULT - SUBJECTIVE AND OBJECTIVE BOX
Patient is a 67y old  Male who presents with a chief complaint of hypoxia (17 Feb 2023 08:04)    Nnamdi Mccrary MD   [facility]   Pager [phone]  Reachable on Microsoft Teams     SUBJECTIVE / OVERNIGHT EVENTS:  Patient seen and examined this morning in the ED. Difficult to arouse requiring sternal rub. After wakening very restless pulling off HFNC.   Following simple commands, but not responding verbally.     MEDICATIONS  (STANDING):  albuterol/ipratropium for Nebulization 3 milliLiter(s) Nebulizer every 6 hours  atorvastatin 10 milliGRAM(s) Oral at bedtime  budesonide 160 MICROgram(s)/formoterol 4.5 MICROgram(s) Inhaler 2 Puff(s) Inhalation two times a day  dorzolamide 2%/timolol 0.5% Ophthalmic Solution 1 Drop(s) Both EYES two times a day  enoxaparin Injectable 40 milliGRAM(s) SubCutaneous every 24 hours  famotidine    Tablet 40 milliGRAM(s) Oral two times a day  gabapentin 300 milliGRAM(s) Oral two times a day  hydrALAZINE 5 milliGRAM(s) Oral once  latanoprost 0.005% Ophthalmic Solution 1 Drop(s) Both EYES at bedtime  losartan 50 milliGRAM(s) Oral daily  oxybutynin 5 milliGRAM(s) Oral two times a day  predniSONE   Tablet 40 milliGRAM(s) Oral daily  tamsulosin 0.4 milliGRAM(s) Oral at bedtime  traZODone 100 milliGRAM(s) Oral at bedtime    MEDICATIONS  (PRN):  acetaminophen     Tablet .. 650 milliGRAM(s) Oral every 6 hours PRN Temp greater or equal to 38C (100.4F), Mild Pain (1 - 3)  cyclobenzaprine 5 milliGRAM(s) Oral three times a day PRN Muscle Spasm      Vital Signs Last 24 Hrs  T(C): 36.4 (17 Feb 2023 09:25), Max: 36.8 (16 Feb 2023 18:46)  T(F): 97.5 (17 Feb 2023 09:25), Max: 98.2 (16 Feb 2023 18:46)  HR: 71 (17 Feb 2023 10:12) (71 - 95)  BP: 120/72 (17 Feb 2023 10:12) (120/72 - 185/120)  BP(mean): --  RR: 18 (17 Feb 2023 10:52) (16 - 28)  SpO2: 96% (17 Feb 2023 10:52) (88% - 98%)  CAPILLARY BLOOD GLUCOSE        I&O's Summary      General: elderly man sitting up in bed appears comfortable in NAD, awake and alert  Eyes: conjunctiva clear, nonicteric sclera  HENMT: NCAT, MMM  Neck: Supple, trachea midline   Respiratory: No respiratory distress, CTABL, No rales, rhonchi, wheezing.  Cardiovascular: S1,S2; Regular rate and rhythm; No m/g/r. 2+ peripheral pulses  Gastrointestinal: Soft, Nontender, Nondistended; +BS.   Extremities: atrophic limbs, No c/c/e; warm to touch  Neurological: Moving all 4 extremities spontaneously ;  Skin: No rashes, No erythema   Psych: agitated pulling at tubes     LABS:                        17.7   8.86  )-----------( 230      ( 17 Feb 2023 05:00 )             52.9     02-17    135  |  98  |  8   ----------------------------<  126<H>  5.0   |  25  |  0.78    Ca    9.5      17 Feb 2023 05:00    TPro  8.3  /  Alb  4.8  /  TBili  0.7  /  DBili  x   /  AST  44<H>  /  ALT  20  /  AlkPhos  107  02-17              RADIOLOGY & ADDITIONAL TESTS:    Imaging Personally Reviewed:    Consultant(s) Notes Reviewed:      Care Discussed with Consultants/Other Providers:   Patient is a 67y old  Male who presents with a chief complaint of hypoxia (17 Feb 2023 08:04)    Nnamdi Mccrary MD   [facility]   Pager [phone]  Reachable on Microsoft Teams     SUBJECTIVE / OVERNIGHT EVENTS:  Patient seen and examined this morning in the ED. Difficult to arouse requiring sternal rub. After wakening very restless pulling off HFNC.   Following simple commands, but not responding verbally.     MEDICATIONS  (STANDING):  albuterol/ipratropium for Nebulization 3 milliLiter(s) Nebulizer every 6 hours  atorvastatin 10 milliGRAM(s) Oral at bedtime  budesonide 160 MICROgram(s)/formoterol 4.5 MICROgram(s) Inhaler 2 Puff(s) Inhalation two times a day  dorzolamide 2%/timolol 0.5% Ophthalmic Solution 1 Drop(s) Both EYES two times a day  enoxaparin Injectable 40 milliGRAM(s) SubCutaneous every 24 hours  famotidine    Tablet 40 milliGRAM(s) Oral two times a day  gabapentin 300 milliGRAM(s) Oral two times a day  hydrALAZINE 5 milliGRAM(s) Oral once  latanoprost 0.005% Ophthalmic Solution 1 Drop(s) Both EYES at bedtime  losartan 50 milliGRAM(s) Oral daily  oxybutynin 5 milliGRAM(s) Oral two times a day  predniSONE   Tablet 40 milliGRAM(s) Oral daily  tamsulosin 0.4 milliGRAM(s) Oral at bedtime  traZODone 100 milliGRAM(s) Oral at bedtime    MEDICATIONS  (PRN):  acetaminophen     Tablet .. 650 milliGRAM(s) Oral every 6 hours PRN Temp greater or equal to 38C (100.4F), Mild Pain (1 - 3)  cyclobenzaprine 5 milliGRAM(s) Oral three times a day PRN Muscle Spasm      Vital Signs Last 24 Hrs  T(C): 36.4 (17 Feb 2023 09:25), Max: 36.8 (16 Feb 2023 18:46)  T(F): 97.5 (17 Feb 2023 09:25), Max: 98.2 (16 Feb 2023 18:46)  HR: 71 (17 Feb 2023 10:12) (71 - 95)  BP: 120/72 (17 Feb 2023 10:12) (120/72 - 185/120)  BP(mean): --  RR: 18 (17 Feb 2023 10:52) (16 - 28)  SpO2: 96% (17 Feb 2023 10:52) (88% - 98%)  CAPILLARY BLOOD GLUCOSE        I&O's Summary      General: elderly man sitting up in bed appears comfortable in NAD, awake and alert  Eyes: conjunctiva clear, nonicteric sclera  HENMT: NCAT, MMM  Neck: Supple, trachea midline   Respiratory: No respiratory distress, CTABL, No rales, rhonchi, wheezing.  Cardiovascular: S1,S2; Regular rate and rhythm; No m/g/r. 2+ peripheral pulses  Gastrointestinal: Soft, Nontender, Nondistended; +BS.   Extremities: atrophic limbs, No c/c/e; warm to touch  Neurological: Moving all 4 extremities spontaneously ;  Skin: No rashes, No erythema   Psych: agitated and pulling at tubes     LABS:                        17.7   8.86  )-----------( 230      ( 17 Feb 2023 05:00 )             52.9     02-17    135  |  98  |  8   ----------------------------<  126<H>  5.0   |  25  |  0.78    Ca    9.5      17 Feb 2023 05:00    TPro  8.3  /  Alb  4.8  /  TBili  0.7  /  DBili  x   /  AST  44<H>  /  ALT  20  /  AlkPhos  107  02-17              RADIOLOGY & ADDITIONAL TESTS:    Imaging Personally Reviewed:    Consultant(s) Notes Reviewed:      Care Discussed with Consultants/Other Providers:

## 2023-02-17 NOTE — PATIENT PROFILE ADULT - FALL HARM RISK - HARM RISK INTERVENTIONS
Assistance with ambulation/Assistance OOB with selected safe patient handling equipment/Communicate Risk of Fall with Harm to all staff/Discuss with provider need for PT consult/Monitor gait and stability/Provide patient with walking aids - walker, cane, crutches/Reinforce activity limits and safety measures with patient and family/Tailored Fall Risk Interventions/Use of alarms - bed, chair and/or voice tab/Visual Cue: Yellow wristband and red socks/Bed in lowest position, wheels locked, appropriate side rails in place/Call bell, personal items and telephone in reach/Instruct patient to call for assistance before getting out of bed or chair/Non-slip footwear when patient is out of bed/[place] to call system/Physically safe environment - no spills, clutter or unnecessary equipment/Purposeful Proactive Rounding/Room/bathroom lighting operational, light cord in reach

## 2023-02-17 NOTE — PROGRESS NOTE ADULT - TIME BILLING
review of laboratory data, radiology results, discussion with patient, documenting in [place]. Interventions were performed as documented above.

## 2023-02-17 NOTE — PROGRESS NOTE ADULT - ATTENDING COMMENTS
67 year old male, active smoker, with medical history of hypertension, BPH, glaucoma, spondylolisthesis, and MAC infection (never treated) diagnosed last year but lost to follow up, presents from his PCP with hypoxemia. SpO2 improved with use of HFNC. Suspected acute hypoxemic respiratory failure secondary to COPD exacerbation.    At bedside today transitioned to 6 L NC. SpO2 is acceptable.  Continue supplemental O2 to target SpO2 > 92%  Suspect there is a chronic component of his hypoxemia. Will likely need O2 supplementation set up for home.  Check TTE  Prednisone 40 mg daily. Plan for 5 day course.  Bronchodilators - Symbicort, Duonebs  Would not initiate MAC therapy at this time.

## 2023-02-17 NOTE — PATIENT PROFILE ADULT - FUNCTIONAL ASSESSMENT - BASIC MOBILITY 6.
3-calculated by average/Not able to assess (calculate score using [facility] averaging method)

## 2023-02-17 NOTE — PHYSICAL THERAPY INITIAL EVALUATION ADULT - PERTINENT HX OF CURRENT PROBLEM, REHAB EVAL
67 year old male with past medical history of hypertension, BPH, glaucoma, spondylolisthesis, 30-pack-year smoking history, and MAC pneumonia diagnosed last year but lost to follow up, presented with hypoxic respiratory failure

## 2023-02-17 NOTE — ED ADULT NURSE REASSESSMENT NOTE - NS ED NURSE REASSESS COMMENT FT1
Pt wife made us aware pt removed oxygen again, removed cardiac monitor/ O2 sat monitoring. Pt unable to be verbally redirected multiple times. Pt wife at bedside with jarrod interrupter [id number] requesting pt received medications to relax. ACP aware and pt medicated as ordered.

## 2023-02-17 NOTE — ED ADULT NURSE REASSESSMENT NOTE - NS ED NURSE REASSESS COMMENT FT1
Emiliano interrupter [id number]. Pt with wife at bedside. Pt placed back on cardiac monitor and given AM meds and labs performed. VS as charted. Pt and wife instructed to remain in bed and on hi agnes oxygen at this time. Instructed to call for assistance as needed. wife reports understanding. Report to primary RN. Emiliano interrupter [id number]. Pt with wife at bedside. Pt found with hi agnes cannula off, off cardiac monitor and standing. Pt placed back on cardiac monitor and given AM meds and labs performed. VS as charted. Pt and wife instructed to remain in bed and on hi agnes oxygen at this time. Instructed to call for assistance as needed. wife reports understanding. Report to primary RN. Covering team ACP contacted and made aware.

## 2023-02-17 NOTE — PROGRESS NOTE ADULT - CONVERSATION DETAILS
Spoke with daughter at bedside while patient Spoke with daughter at bedside who states she is HCP. States that father would want CPR/Intubation if indicated.  Patient is FULL CODE

## 2023-02-18 DIAGNOSIS — I27.20 PULMONARY HYPERTENSION, UNSPECIFIED: ICD-10-CM

## 2023-02-18 LAB
ALBUMIN SERPL ELPH-MCNC: 4.6 G/DL — SIGNIFICANT CHANGE UP (ref 3.3–5)
ALP SERPL-CCNC: 111 U/L — SIGNIFICANT CHANGE UP (ref 40–120)
ALT FLD-CCNC: 14 U/L — SIGNIFICANT CHANGE UP (ref 4–41)
ANION GAP SERPL CALC-SCNC: 16 MMOL/L — HIGH (ref 7–14)
APPEARANCE UR: CLEAR — SIGNIFICANT CHANGE UP
AST SERPL-CCNC: 26 U/L — SIGNIFICANT CHANGE UP (ref 4–40)
BACTERIA # UR AUTO: NEGATIVE — SIGNIFICANT CHANGE UP
BASE EXCESS BLDV CALC-SCNC: -1.9 MMOL/L — SIGNIFICANT CHANGE UP (ref -2–3)
BASOPHILS # BLD AUTO: 0.05 K/UL — SIGNIFICANT CHANGE UP (ref 0–0.2)
BASOPHILS NFR BLD AUTO: 0.4 % — SIGNIFICANT CHANGE UP (ref 0–2)
BILIRUB SERPL-MCNC: 0.6 MG/DL — SIGNIFICANT CHANGE UP (ref 0.2–1.2)
BILIRUB UR-MCNC: NEGATIVE — SIGNIFICANT CHANGE UP
BLOOD GAS VENOUS COMPREHENSIVE RESULT: SIGNIFICANT CHANGE UP
BUN SERPL-MCNC: 11 MG/DL — SIGNIFICANT CHANGE UP (ref 7–23)
CALCIUM SERPL-MCNC: 9.8 MG/DL — SIGNIFICANT CHANGE UP (ref 8.4–10.5)
CHLORIDE BLDV-SCNC: 101 MMOL/L — SIGNIFICANT CHANGE UP (ref 96–108)
CHLORIDE SERPL-SCNC: 98 MMOL/L — SIGNIFICANT CHANGE UP (ref 98–107)
CO2 BLDV-SCNC: 25.7 MMOL/L — SIGNIFICANT CHANGE UP (ref 22–26)
CO2 SERPL-SCNC: 21 MMOL/L — LOW (ref 22–31)
COLOR SPEC: SIGNIFICANT CHANGE UP
CREAT SERPL-MCNC: 0.84 MG/DL — SIGNIFICANT CHANGE UP (ref 0.5–1.3)
DIFF PNL FLD: NEGATIVE — SIGNIFICANT CHANGE UP
EGFR: 94 ML/MIN/1.73M2 — SIGNIFICANT CHANGE UP
EOSINOPHIL # BLD AUTO: 0.29 K/UL — SIGNIFICANT CHANGE UP (ref 0–0.5)
EOSINOPHIL NFR BLD AUTO: 2.4 % — SIGNIFICANT CHANGE UP (ref 0–6)
GAS PNL BLDV: 133 MMOL/L — LOW (ref 136–145)
GAS PNL BLDV: SIGNIFICANT CHANGE UP
GLUCOSE BLDV-MCNC: 197 MG/DL — HIGH (ref 70–99)
GLUCOSE SERPL-MCNC: 139 MG/DL — HIGH (ref 70–99)
GLUCOSE UR QL: NEGATIVE — SIGNIFICANT CHANGE UP
HCO3 BLDV-SCNC: 24 MMOL/L — SIGNIFICANT CHANGE UP (ref 22–29)
HCT VFR BLD CALC: 51.6 % — HIGH (ref 39–50)
HCT VFR BLDA CALC: 52 % — HIGH (ref 39–51)
HGB BLD CALC-MCNC: 17.4 G/DL — SIGNIFICANT CHANGE UP (ref 12.6–17.4)
HGB BLD-MCNC: 17.3 G/DL — HIGH (ref 13–17)
IANC: 8.46 K/UL — HIGH (ref 1.8–7.4)
IMM GRANULOCYTES NFR BLD AUTO: 0.5 % — SIGNIFICANT CHANGE UP (ref 0–0.9)
KETONES UR-MCNC: NEGATIVE — SIGNIFICANT CHANGE UP
LACTATE BLDV-MCNC: 2.2 MMOL/L — HIGH (ref 0.5–2)
LEUKOCYTE ESTERASE UR-ACNC: NEGATIVE — SIGNIFICANT CHANGE UP
LYMPHOCYTES # BLD AUTO: 18.9 % — SIGNIFICANT CHANGE UP (ref 13–44)
LYMPHOCYTES # BLD AUTO: 2.3 K/UL — SIGNIFICANT CHANGE UP (ref 1–3.3)
MAGNESIUM SERPL-MCNC: 2.2 MG/DL — SIGNIFICANT CHANGE UP (ref 1.6–2.6)
MCHC RBC-ENTMCNC: 32.3 PG — SIGNIFICANT CHANGE UP (ref 27–34)
MCHC RBC-ENTMCNC: 33.5 GM/DL — SIGNIFICANT CHANGE UP (ref 32–36)
MCV RBC AUTO: 96.4 FL — SIGNIFICANT CHANGE UP (ref 80–100)
MONOCYTES # BLD AUTO: 0.99 K/UL — HIGH (ref 0–0.9)
MONOCYTES NFR BLD AUTO: 8.1 % — SIGNIFICANT CHANGE UP (ref 2–14)
NEUTROPHILS # BLD AUTO: 8.46 K/UL — HIGH (ref 1.8–7.4)
NEUTROPHILS NFR BLD AUTO: 69.7 % — SIGNIFICANT CHANGE UP (ref 43–77)
NITRITE UR-MCNC: NEGATIVE — SIGNIFICANT CHANGE UP
NRBC # BLD: 0 /100 WBCS — SIGNIFICANT CHANGE UP (ref 0–0)
NRBC # FLD: 0 K/UL — SIGNIFICANT CHANGE UP (ref 0–0)
PCO2 BLDV: 45 MMHG — SIGNIFICANT CHANGE UP (ref 42–55)
PH BLDV: 7.34 — SIGNIFICANT CHANGE UP (ref 7.32–7.43)
PH UR: 6.5 — SIGNIFICANT CHANGE UP (ref 5–8)
PHOSPHATE SERPL-MCNC: 4.3 MG/DL — SIGNIFICANT CHANGE UP (ref 2.5–4.5)
PLATELET # BLD AUTO: 240 K/UL — SIGNIFICANT CHANGE UP (ref 150–400)
PO2 BLDV: 46 MMHG — HIGH (ref 25–45)
POTASSIUM BLDV-SCNC: 4.1 MMOL/L — SIGNIFICANT CHANGE UP (ref 3.5–5.1)
POTASSIUM SERPL-MCNC: 3.4 MMOL/L — LOW (ref 3.5–5.3)
POTASSIUM SERPL-SCNC: 3.4 MMOL/L — LOW (ref 3.5–5.3)
PROT SERPL-MCNC: 7.9 G/DL — SIGNIFICANT CHANGE UP (ref 6–8.3)
PROT UR-MCNC: ABNORMAL
RBC # BLD: 5.35 M/UL — SIGNIFICANT CHANGE UP (ref 4.2–5.8)
RBC # FLD: 12.9 % — SIGNIFICANT CHANGE UP (ref 10.3–14.5)
RBC CASTS # UR COMP ASSIST: SIGNIFICANT CHANGE UP /HPF (ref 0–4)
SAO2 % BLDV: 74 % — SIGNIFICANT CHANGE UP (ref 67–88)
SODIUM SERPL-SCNC: 135 MMOL/L — SIGNIFICANT CHANGE UP (ref 135–145)
SP GR SPEC: 1.01 — LOW (ref 1.01–1.05)
UROBILINOGEN FLD QL: SIGNIFICANT CHANGE UP
WBC # BLD: 12.15 K/UL — HIGH (ref 3.8–10.5)
WBC # FLD AUTO: 12.15 K/UL — HIGH (ref 3.8–10.5)
WBC UR QL: SIGNIFICANT CHANGE UP /HPF (ref 0–5)

## 2023-02-18 PROCEDURE — 99233 SBSQ HOSP IP/OBS HIGH 50: CPT

## 2023-02-18 RX ORDER — HYDRALAZINE HCL 50 MG
2.5 TABLET ORAL ONCE
Refills: 0 | Status: COMPLETED | OUTPATIENT
Start: 2023-02-18 | End: 2023-02-18

## 2023-02-18 RX ORDER — HALOPERIDOL DECANOATE 100 MG/ML
0.5 INJECTION INTRAMUSCULAR EVERY 6 HOURS
Refills: 0 | Status: DISCONTINUED | OUTPATIENT
Start: 2023-02-18 | End: 2023-02-20

## 2023-02-18 RX ORDER — POTASSIUM CHLORIDE 20 MEQ
20 PACKET (EA) ORAL ONCE
Refills: 0 | Status: COMPLETED | OUTPATIENT
Start: 2023-02-18 | End: 2023-02-18

## 2023-02-18 RX ORDER — IPRATROPIUM/ALBUTEROL SULFATE 18-103MCG
3 AEROSOL WITH ADAPTER (GRAM) INHALATION EVERY 12 HOURS
Refills: 0 | Status: DISCONTINUED | OUTPATIENT
Start: 2023-02-18 | End: 2023-02-21

## 2023-02-18 RX ORDER — HALOPERIDOL DECANOATE 100 MG/ML
0.5 INJECTION INTRAMUSCULAR ONCE
Refills: 0 | Status: COMPLETED | OUTPATIENT
Start: 2023-02-18 | End: 2023-02-18

## 2023-02-18 RX ADMIN — LATANOPROST 1 DROP(S): 0.05 SOLUTION/ DROPS OPHTHALMIC; TOPICAL at 21:55

## 2023-02-18 RX ADMIN — TAMSULOSIN HYDROCHLORIDE 0.4 MILLIGRAM(S): 0.4 CAPSULE ORAL at 21:55

## 2023-02-18 RX ADMIN — BUDESONIDE AND FORMOTEROL FUMARATE DIHYDRATE 2 PUFF(S): 160; 4.5 AEROSOL RESPIRATORY (INHALATION) at 10:06

## 2023-02-18 RX ADMIN — Medication 2.5 MILLIGRAM(S): at 01:05

## 2023-02-18 RX ADMIN — Medication 100 MILLIGRAM(S): at 21:55

## 2023-02-18 RX ADMIN — Medication 3 MILLILITER(S): at 20:11

## 2023-02-18 RX ADMIN — BUDESONIDE AND FORMOTEROL FUMARATE DIHYDRATE 2 PUFF(S): 160; 4.5 AEROSOL RESPIRATORY (INHALATION) at 21:54

## 2023-02-18 RX ADMIN — GABAPENTIN 300 MILLIGRAM(S): 400 CAPSULE ORAL at 17:57

## 2023-02-18 RX ADMIN — Medication 1 PATCH: at 19:56

## 2023-02-18 RX ADMIN — DORZOLAMIDE HYDROCHLORIDE TIMOLOL MALEATE 1 DROP(S): 20; 5 SOLUTION/ DROPS OPHTHALMIC at 05:32

## 2023-02-18 RX ADMIN — Medication 40 MILLIGRAM(S): at 05:31

## 2023-02-18 RX ADMIN — LOSARTAN POTASSIUM 50 MILLIGRAM(S): 100 TABLET, FILM COATED ORAL at 05:31

## 2023-02-18 RX ADMIN — ATORVASTATIN CALCIUM 10 MILLIGRAM(S): 80 TABLET, FILM COATED ORAL at 21:55

## 2023-02-18 RX ADMIN — GABAPENTIN 300 MILLIGRAM(S): 400 CAPSULE ORAL at 05:31

## 2023-02-18 RX ADMIN — ENOXAPARIN SODIUM 40 MILLIGRAM(S): 100 INJECTION SUBCUTANEOUS at 21:56

## 2023-02-18 RX ADMIN — Medication 20 MILLIEQUIVALENT(S): at 21:53

## 2023-02-18 RX ADMIN — Medication 1 PATCH: at 07:00

## 2023-02-18 RX ADMIN — Medication 3 MILLILITER(S): at 03:07

## 2023-02-18 RX ADMIN — Medication 1 PATCH: at 11:41

## 2023-02-18 RX ADMIN — CYCLOBENZAPRINE HYDROCHLORIDE 5 MILLIGRAM(S): 10 TABLET, FILM COATED ORAL at 10:12

## 2023-02-18 RX ADMIN — FAMOTIDINE 40 MILLIGRAM(S): 10 INJECTION INTRAVENOUS at 17:56

## 2023-02-18 RX ADMIN — HALOPERIDOL DECANOATE 0.5 MILLIGRAM(S): 100 INJECTION INTRAMUSCULAR at 13:56

## 2023-02-18 RX ADMIN — Medication 1 PATCH: at 18:41

## 2023-02-18 RX ADMIN — FAMOTIDINE 40 MILLIGRAM(S): 10 INJECTION INTRAVENOUS at 05:30

## 2023-02-18 RX ADMIN — DORZOLAMIDE HYDROCHLORIDE TIMOLOL MALEATE 1 DROP(S): 20; 5 SOLUTION/ DROPS OPHTHALMIC at 17:58

## 2023-02-18 RX ADMIN — HALOPERIDOL DECANOATE 0.5 MILLIGRAM(S): 100 INJECTION INTRAMUSCULAR at 17:08

## 2023-02-18 NOTE — DIETITIAN INITIAL EVALUATION ADULT - ORAL INTAKE PTA/DIET HISTORY
daughter states pt has had no changes in baseline appetite PTA. denies poor PO, but pt with poor appetite for past couple of years.

## 2023-02-18 NOTE — PROGRESS NOTE ADULT - SUBJECTIVE AND OBJECTIVE BOX
Patient is a 67y old  Male who presents with a chief complaint of Acute respiratory failure with hypoxia  67-year-old male with past medical history of hypertension, BPH, glaucoma, spondylolisthesis, 30-pack-year smoking history, and MAC pneumonia diagnosed last year but lost to follow up, presenting with hypoxic respiratory failure. history provided by daughter at bedside   (18 Feb 2023 11:59)      SUBJECTIVE / OVERNIGHT EVENTS: No acute events overnight. More calm today. Wife at bedside, states he is still somewhat confused.     MEDICATIONS  (STANDING):  albuterol/ipratropium for Nebulization 3 milliLiter(s) Nebulizer every 12 hours  atorvastatin 10 milliGRAM(s) Oral at bedtime  budesonide 160 MICROgram(s)/formoterol 4.5 MICROgram(s) Inhaler 2 Puff(s) Inhalation two times a day  coronavirus bivalent (EUA) Booster Vaccine (PFIZER) 0.3 milliLiter(s) IntraMuscular once  dorzolamide 2%/timolol 0.5% Ophthalmic Solution 1 Drop(s) Both EYES two times a day  enoxaparin Injectable 40 milliGRAM(s) SubCutaneous every 24 hours  famotidine    Tablet 40 milliGRAM(s) Oral two times a day  gabapentin 300 milliGRAM(s) Oral two times a day  influenza  Vaccine (HIGH DOSE) 0.7 milliLiter(s) IntraMuscular once  latanoprost 0.005% Ophthalmic Solution 1 Drop(s) Both EYES at bedtime  losartan 50 milliGRAM(s) Oral daily  nicotine -   7 mG/24Hr(s) Patch 1 Patch Transdermal daily  predniSONE   Tablet 40 milliGRAM(s) Oral daily  tamsulosin 0.4 milliGRAM(s) Oral at bedtime  traZODone 100 milliGRAM(s) Oral at bedtime    MEDICATIONS  (PRN):  acetaminophen     Tablet .. 650 milliGRAM(s) Oral every 6 hours PRN Temp greater or equal to 38C (100.4F), Mild Pain (1 - 3)  cyclobenzaprine 5 milliGRAM(s) Oral three times a day PRN Muscle Spasm      T(C): 36.4 (02-18-23 @ 10:00), Max: 36.8 (02-17-23 @ 18:55)  HR: 95 (02-18-23 @ 10:00) (68 - 104)  BP: 153/95 (02-18-23 @ 10:00) (116/77 - 193/102)  RR: 20 (02-18-23 @ 10:00) (14 - 23)  SpO2: 91% (02-18-23 @ 10:00) (91% - 97%)  CAPILLARY BLOOD GLUCOSE        I&O's Summary      PHYSICAL EXAM:  GENERAL: no apparent distress, on room air  EYES: EOMI, PERRLA, conjunctiva and sclera clear b/l  CHEST/LUNG: Clear to auscultation bilaterally; No wheezing or crackles  HEART: s1/s2, RRR, no murmurs appreciated  ABDOMEN: Soft, Nontender, Nondistended; Bowel sounds present  EXTREMITIES:  2+ Peripheral Pulses, No clubbing, cyanosis, or edema  MSK: no joint effusions  Back: no spinal tenderness  NEUROLOGY: awake, alert, no sensory or motor deficits, 5/5 muscle strength equal in all extremities, CN 2-12 grossly intact  PSYCH: calm, cooperative  SKIN: No rashes or lesions    LABS:                        17.3   12.15 )-----------( 240      ( 18 Feb 2023 05:42 )             51.6     02-18    135  |  98  |  11  ----------------------------<  139<H>  3.4<L>   |  21<L>  |  0.84    Ca    9.8      18 Feb 2023 05:42  Phos  4.3     02-18  Mg     2.20     02-18    TPro  7.9  /  Alb  4.6  /  TBili  0.6  /  DBili  x   /  AST  26  /  ALT  14  /  AlkPhos  111  02-18              RADIOLOGY & ADDITIONAL TESTS:

## 2023-02-18 NOTE — DIETITIAN INITIAL EVALUATION ADULT - PERTINENT MEDS FT
MEDICATIONS  (STANDING):  albuterol/ipratropium for Nebulization 3 milliLiter(s) Nebulizer every 12 hours  atorvastatin 10 milliGRAM(s) Oral at bedtime  budesonide 160 MICROgram(s)/formoterol 4.5 MICROgram(s) Inhaler 2 Puff(s) Inhalation two times a day  coronavirus bivalent (EUA) Booster Vaccine (PFIZER) 0.3 milliLiter(s) IntraMuscular once  dorzolamide 2%/timolol 0.5% Ophthalmic Solution 1 Drop(s) Both EYES two times a day  enoxaparin Injectable 40 milliGRAM(s) SubCutaneous every 24 hours  famotidine    Tablet 40 milliGRAM(s) Oral two times a day  gabapentin 300 milliGRAM(s) Oral two times a day  haloperidol    Injectable 2 milliGRAM(s) IntraMuscular once  influenza  Vaccine (HIGH DOSE) 0.7 milliLiter(s) IntraMuscular once  latanoprost 0.005% Ophthalmic Solution 1 Drop(s) Both EYES at bedtime  losartan 50 milliGRAM(s) Oral daily  nicotine -   7 mG/24Hr(s) Patch 1 Patch Transdermal daily  predniSONE   Tablet 40 milliGRAM(s) Oral daily  tamsulosin 0.4 milliGRAM(s) Oral at bedtime  traZODone 100 milliGRAM(s) Oral at bedtime    MEDICATIONS  (PRN):  acetaminophen     Tablet .. 650 milliGRAM(s) Oral every 6 hours PRN Temp greater or equal to 38C (100.4F), Mild Pain (1 - 3)  cyclobenzaprine 5 milliGRAM(s) Oral three times a day PRN Muscle Spasm

## 2023-02-18 NOTE — DIETITIAN INITIAL EVALUATION ADULT - PERTINENT LABORATORY DATA
02-18    135  |  98  |  11  ----------------------------<  139<H>  3.4<L>   |  21<L>  |  0.84    Ca    9.8      18 Feb 2023 05:42  Phos  4.3     02-18  Mg     2.20     02-18    TPro  7.9  /  Alb  4.6  /  TBili  0.6  /  DBili  x   /  AST  26  /  ALT  14  /  AlkPhos  111  02-18  A1C with Estimated Average Glucose Result: 6.5 % (02-17-23 @ 05:00)

## 2023-02-18 NOTE — PROVIDER CONTACT NOTE (OTHER) - ACTION/TREATMENT ORDERED:
Vitals after IV Hydralazine are as follow  /93  Temp 98  HR 96  SpO2 97%  Safety maintained. Will continue to monitor.

## 2023-02-18 NOTE — DIETITIAN INITIAL EVALUATION ADULT - ADD RECOMMEND
1. Encourage PO intake and honor food preferences as able.   2. Continue to document PO in RN flow sheets and monitor weekly weights.   3. Daily multivitamin.

## 2023-02-18 NOTE — DIETITIAN INITIAL EVALUATION ADULT - OTHER INFO
A&Ox4- restless. Daughter at bedside provided collateral and translation. Pt with good PO during hospitalization; completing >75% meals. No reported GI issues (nausea/vomiting/diarrhea/constipation.) Denies any chewing or swallowing difficulties at this time. NKFA. UBW reported 115 pounds; daughter denies wt loss and confirms that pt has always been thin.

## 2023-02-18 NOTE — DIETITIAN INITIAL EVALUATION ADULT - WEIGHT FOR BMI (LBS)
" Patient ID: Silke Greenberg is a 62 y.o. female.    Chief Complaint: Diabetes    HPI Patient is 62-year-old female who presents visit to follow up diabetes.  Currently takes Farxiga glipizide and Soliqua (41 units daily).  Fasting BG 100s-180s. Taking insulin for approx one year. Denies hypoglycemia, extremity numbness and tingling. Does check her feet for ulcers etc.    Takes bp meds as prescribed. Does not check bps at home.    Family History   Problem Relation Age of Onset    Stroke Mother     Heart disease Mother     Heart disease Father     Breast cancer Neg Hx     Colon cancer Neg Hx     Ovarian cancer Neg Hx     Thrombosis Neg Hx        Current Outpatient Medications:     ACCU-CHEK JASPAL PLUS TEST STRP Strp, TEST BEFORE MEALS  AND AT BEDTIME, Disp: 400 strip, Rfl: 2    ACCU-CHEK SOFTCLIX LANCETS Misc, Use 1 lancet for each CBG check, Disp: 300 each, Rfl: 3    aspirin (ECOTRIN) 81 MG EC tablet, Take 81 mg by mouth once daily., Disp: , Rfl:     atorvastatin (LIPITOR) 20 MG tablet, Take 1 tablet (20 mg total) by mouth once daily., Disp: 90 tablet, Rfl: 3    BD ALCOHOL SWABS PadM, Apply 3 each topically once daily., Disp: 300 each, Rfl: 11    BD ULTRA-FINE MINI PEN NEEDLE 31 gauge x 3/16" Ndle, 1 each by Misc.(Non-Drug; Combo Route) route 2 (two) times daily., Disp: 100 each, Rfl: 0    betamethasone dipropionate (DIPROLENE) 0.05 % lotion, Apply topically daily as needed. For flare ups of dermatitis, Disp: 60 mL, Rfl: 5    betamethasone valerate 0.1% (VALISONE) 0.1 % Oint, Apply topically 2 (two) times daily., Disp: 45 g, Rfl: 2    blood-glucose meter (ACCU-CHEK JASPAL PLUS METER) Misc, Use daily to check blood sugar, Disp: 1 each, Rfl: 0    buPROPion (WELLBUTRIN XL) 300 MG 24 hr tablet, TAKE 1 TABLET ONCE DAILY., Disp: 90 tablet, Rfl: 4    famotidine (PEPCID) 40 MG tablet, Take 1 tablet (40 mg total) by mouth once daily., Disp: 30 tablet, Rfl: 11    FARXIGA 10 mg Tab, TAKE 1 TABLET EVERY " DAY, Disp: 90 tablet, Rfl: 4    fluticasone propionate (FLONASE) 50 mcg/actuation nasal spray, 1 spray (50 mcg total) by Each Nostril route once daily., Disp: 15.8 mL, Rfl: 1    gemfibroziL (LOPID) 600 MG tablet, Take 1 tablet (600 mg total) by mouth 2 (two) times daily before meals., Disp: 60 tablet, Rfl: 5    glipiZIDE (GLUCOTROL) 5 MG tablet, Take 1 tablet (5 mg total) by mouth 2 (two) times daily before meals., Disp: 180 tablet, Rfl: 3    hydrocortisone 2.5 % cream, Apply topically 2 (two) times daily., Disp: 20 g, Rfl: 2    ketoconazole (NIZORAL) 2 % shampoo, Apply topically twice a week., Disp: 120 mL, Rfl: 11    levothyroxine (SYNTHROID) 50 MCG tablet, Take 1 tablet (50 mcg total) by mouth once daily., Disp: 90 tablet, Rfl: 3    lidocaine HCL 2% (XYLOCAINE) 2 % jelly, Apply to affected area twice daily 30 minutes prior to applying clobetasol, Disp: 30 mL, Rfl: 1    linaCLOtide (LINZESS) 145 mcg Cap capsule, Take 1 capsule (145 mcg total) by mouth once daily., Disp: 30 capsule, Rfl: 0    lisinopriL 10 MG tablet, Take 1 tablet (10 mg total) by mouth once daily., Disp: 90 tablet, Rfl: 2    metoprolol tartrate (LOPRESSOR) 50 MG tablet, TAKE 1 TABLET 2 TIMES DAILY., Disp: 180 tablet, Rfl: 4    paroxetine (PAXIL) 10 MG tablet, Take 1 tablet (10 mg total) by mouth every morning., Disp: 90 tablet, Rfl: 1    potassium chloride (KLOR-CON) 10 MEQ TbSR, Take 1 tablet (10 mEq total) by mouth once daily., Disp: 30 tablet, Rfl: 2    risperiDONE (RISPERDAL M-TABS) 1 MG TbDL, DISSOLVE 1 TABLET ON THE TONGUE ONCE DAILY IN THE EVENING, Disp: 90 tablet, Rfl: 0    SOLIQUA 100/33 100 unit-33 mcg/mL InPn, Inject 41 Units into the skin once daily., Disp: 15 mL, Rfl: 2    Review of Systems   Constitutional: Negative for activity change and appetite change.   Eyes: Negative for visual disturbance.   Respiratory: Negative for shortness of breath.    Cardiovascular: Negative for chest pain.   Gastrointestinal: Negative for  "abdominal pain, diarrhea and nausea.   Endocrine: Negative for polydipsia and polyuria.   Musculoskeletal: Negative for arthralgias.   Skin: Negative for rash.   Neurological: Negative for dizziness, weakness, numbness and headaches.   Psychiatric/Behavioral: Negative for sleep disturbance. The patient is not nervous/anxious.        Objective:   /62 (BP Location: Left arm, Patient Position: Sitting, BP Method: Large (Automatic))   Pulse 75   Resp 18   Ht 5' 1" (1.549 m)   Wt 81.4 kg (179 lb 7.3 oz)   SpO2 96%   BMI 33.91 kg/m²      Physical Exam   Constitutional: She is oriented to person, place, and time. She appears well-developed and well-nourished.   HENT:   Head: Normocephalic and atraumatic.   Eyes: Conjunctivae are normal.   Neck: Normal range of motion.   Cardiovascular: Normal rate, regular rhythm, normal heart sounds and intact distal pulses.   Pulmonary/Chest: Effort normal and breath sounds normal.   Abdominal: Soft.   Musculoskeletal: Normal range of motion.   Neurological: She is alert and oriented to person, place, and time.   Skin: Skin is warm.   Psychiatric: She has a normal mood and affect. Her behavior is normal.       Assessment & Plan     Problem List Items Addressed This Visit        Cardiac/Vascular    Essential hypertension    Current Assessment & Plan     -bp controlled. Continue lisinopril and metoprolol            Renal/    Hypercalcemia    Current Assessment & Plan     -has been seen by nephro in the past who recommended stopping both hctz and vit d.  -patient nol longer takes either of these meds and ca and K have been stable.  -will continue K supplements for now         Relevant Medications    potassium chloride (KLOR-CON) 10 MEQ TbSR       Endocrine    Type 2 diabetes mellitus without complication - Primary    Overview     On glipizide, farxiga, and Soliqua         Current Assessment & Plan     -most recent A1c 6.7% 3 months ago. Continue current meds         Relevant " Orders    POCT Glucose, Hand-Held Device (Completed)    Comprehensive metabolic panel    Hemoglobin A1C            Follow up in about 3 months (around 8/4/2020).      Disclaimer:  This note may have been prepared using voice recognition software, without extensive proofreading. As such, there could be errors within the text such as sound alike errors.   115

## 2023-02-18 NOTE — DIETITIAN INITIAL EVALUATION ADULT - PERSON TAUGHT/METHOD
increased needs for COPD and how to increase caloric intake. suggested smoothies with ensure/protein powder for extra 3-400 erma/d/verbal instruction/daughter instructed

## 2023-02-18 NOTE — DIETITIAN INITIAL EVALUATION ADULT - REASON FOR ADMISSION
Acute respiratory failure with hypoxia  67-year-old male with past medical history of hypertension, BPH, glaucoma, spondylolisthesis, 30-pack-year smoking history, and MAC pneumonia diagnosed last year but lost to follow up, presenting with hypoxic respiratory failure. history provided by daughter at bedside

## 2023-02-19 LAB
ALBUMIN SERPL ELPH-MCNC: 4.3 G/DL — SIGNIFICANT CHANGE UP (ref 3.3–5)
ALP SERPL-CCNC: 109 U/L — SIGNIFICANT CHANGE UP (ref 40–120)
ALT FLD-CCNC: 16 U/L — SIGNIFICANT CHANGE UP (ref 4–41)
ANION GAP SERPL CALC-SCNC: 13 MMOL/L — SIGNIFICANT CHANGE UP (ref 7–14)
AST SERPL-CCNC: 32 U/L — SIGNIFICANT CHANGE UP (ref 4–40)
BASOPHILS # BLD AUTO: 0.07 K/UL — SIGNIFICANT CHANGE UP (ref 0–0.2)
BASOPHILS NFR BLD AUTO: 0.6 % — SIGNIFICANT CHANGE UP (ref 0–2)
BILIRUB SERPL-MCNC: 0.4 MG/DL — SIGNIFICANT CHANGE UP (ref 0.2–1.2)
BUN SERPL-MCNC: 14 MG/DL — SIGNIFICANT CHANGE UP (ref 7–23)
CALCIUM SERPL-MCNC: 9.6 MG/DL — SIGNIFICANT CHANGE UP (ref 8.4–10.5)
CHLORIDE SERPL-SCNC: 102 MMOL/L — SIGNIFICANT CHANGE UP (ref 98–107)
CO2 SERPL-SCNC: 22 MMOL/L — SIGNIFICANT CHANGE UP (ref 22–31)
CREAT SERPL-MCNC: 0.95 MG/DL — SIGNIFICANT CHANGE UP (ref 0.5–1.3)
EGFR: 87 ML/MIN/1.73M2 — SIGNIFICANT CHANGE UP
EOSINOPHIL # BLD AUTO: 0.18 K/UL — SIGNIFICANT CHANGE UP (ref 0–0.5)
EOSINOPHIL NFR BLD AUTO: 1.6 % — SIGNIFICANT CHANGE UP (ref 0–6)
GLUCOSE SERPL-MCNC: 170 MG/DL — HIGH (ref 70–99)
HCT VFR BLD CALC: 48.3 % — SIGNIFICANT CHANGE UP (ref 39–50)
HGB BLD-MCNC: 16 G/DL — SIGNIFICANT CHANGE UP (ref 13–17)
IANC: 7.67 K/UL — HIGH (ref 1.8–7.4)
IMM GRANULOCYTES NFR BLD AUTO: 0.4 % — SIGNIFICANT CHANGE UP (ref 0–0.9)
LYMPHOCYTES # BLD AUTO: 2.29 K/UL — SIGNIFICANT CHANGE UP (ref 1–3.3)
LYMPHOCYTES # BLD AUTO: 20.4 % — SIGNIFICANT CHANGE UP (ref 13–44)
MAGNESIUM SERPL-MCNC: 2.2 MG/DL — SIGNIFICANT CHANGE UP (ref 1.6–2.6)
MCHC RBC-ENTMCNC: 32.6 PG — SIGNIFICANT CHANGE UP (ref 27–34)
MCHC RBC-ENTMCNC: 33.1 GM/DL — SIGNIFICANT CHANGE UP (ref 32–36)
MCV RBC AUTO: 98.4 FL — SIGNIFICANT CHANGE UP (ref 80–100)
MONOCYTES # BLD AUTO: 0.99 K/UL — HIGH (ref 0–0.9)
MONOCYTES NFR BLD AUTO: 8.8 % — SIGNIFICANT CHANGE UP (ref 2–14)
NEUTROPHILS # BLD AUTO: 7.67 K/UL — HIGH (ref 1.8–7.4)
NEUTROPHILS NFR BLD AUTO: 68.2 % — SIGNIFICANT CHANGE UP (ref 43–77)
NRBC # BLD: 0 /100 WBCS — SIGNIFICANT CHANGE UP (ref 0–0)
NRBC # FLD: 0 K/UL — SIGNIFICANT CHANGE UP (ref 0–0)
PHOSPHATE SERPL-MCNC: 3.6 MG/DL — SIGNIFICANT CHANGE UP (ref 2.5–4.5)
PLATELET # BLD AUTO: 231 K/UL — SIGNIFICANT CHANGE UP (ref 150–400)
POTASSIUM SERPL-MCNC: 3.5 MMOL/L — SIGNIFICANT CHANGE UP (ref 3.5–5.3)
POTASSIUM SERPL-SCNC: 3.5 MMOL/L — SIGNIFICANT CHANGE UP (ref 3.5–5.3)
PROT SERPL-MCNC: 7.6 G/DL — SIGNIFICANT CHANGE UP (ref 6–8.3)
RBC # BLD: 4.91 M/UL — SIGNIFICANT CHANGE UP (ref 4.2–5.8)
RBC # FLD: 13.2 % — SIGNIFICANT CHANGE UP (ref 10.3–14.5)
SODIUM SERPL-SCNC: 137 MMOL/L — SIGNIFICANT CHANGE UP (ref 135–145)
WBC # BLD: 11.25 K/UL — HIGH (ref 3.8–10.5)
WBC # FLD AUTO: 11.25 K/UL — HIGH (ref 3.8–10.5)

## 2023-02-19 PROCEDURE — 99233 SBSQ HOSP IP/OBS HIGH 50: CPT

## 2023-02-19 RX ORDER — POLYETHYLENE GLYCOL 3350 17 G/17G
17 POWDER, FOR SOLUTION ORAL DAILY
Refills: 0 | Status: DISCONTINUED | OUTPATIENT
Start: 2023-02-19 | End: 2023-02-21

## 2023-02-19 RX ORDER — SENNA PLUS 8.6 MG/1
2 TABLET ORAL AT BEDTIME
Refills: 0 | Status: DISCONTINUED | OUTPATIENT
Start: 2023-02-19 | End: 2023-02-21

## 2023-02-19 RX ADMIN — CYCLOBENZAPRINE HYDROCHLORIDE 5 MILLIGRAM(S): 10 TABLET, FILM COATED ORAL at 19:01

## 2023-02-19 RX ADMIN — BUDESONIDE AND FORMOTEROL FUMARATE DIHYDRATE 2 PUFF(S): 160; 4.5 AEROSOL RESPIRATORY (INHALATION) at 08:54

## 2023-02-19 RX ADMIN — DORZOLAMIDE HYDROCHLORIDE TIMOLOL MALEATE 1 DROP(S): 20; 5 SOLUTION/ DROPS OPHTHALMIC at 06:01

## 2023-02-19 RX ADMIN — Medication 650 MILLIGRAM(S): at 15:14

## 2023-02-19 RX ADMIN — CYCLOBENZAPRINE HYDROCHLORIDE 5 MILLIGRAM(S): 10 TABLET, FILM COATED ORAL at 00:08

## 2023-02-19 RX ADMIN — Medication 1 PATCH: at 11:40

## 2023-02-19 RX ADMIN — GABAPENTIN 300 MILLIGRAM(S): 400 CAPSULE ORAL at 17:45

## 2023-02-19 RX ADMIN — FAMOTIDINE 40 MILLIGRAM(S): 10 INJECTION INTRAVENOUS at 17:47

## 2023-02-19 RX ADMIN — SENNA PLUS 2 TABLET(S): 8.6 TABLET ORAL at 21:56

## 2023-02-19 RX ADMIN — Medication 100 MILLIGRAM(S): at 21:56

## 2023-02-19 RX ADMIN — POLYETHYLENE GLYCOL 3350 17 GRAM(S): 17 POWDER, FOR SOLUTION ORAL at 17:57

## 2023-02-19 RX ADMIN — Medication 3 MILLILITER(S): at 10:13

## 2023-02-19 RX ADMIN — FAMOTIDINE 40 MILLIGRAM(S): 10 INJECTION INTRAVENOUS at 06:01

## 2023-02-19 RX ADMIN — Medication 1 PATCH: at 18:03

## 2023-02-19 RX ADMIN — ATORVASTATIN CALCIUM 10 MILLIGRAM(S): 80 TABLET, FILM COATED ORAL at 21:56

## 2023-02-19 RX ADMIN — LATANOPROST 1 DROP(S): 0.05 SOLUTION/ DROPS OPHTHALMIC; TOPICAL at 21:55

## 2023-02-19 RX ADMIN — Medication 1 PATCH: at 06:30

## 2023-02-19 RX ADMIN — LOSARTAN POTASSIUM 50 MILLIGRAM(S): 100 TABLET, FILM COATED ORAL at 06:01

## 2023-02-19 RX ADMIN — BUDESONIDE AND FORMOTEROL FUMARATE DIHYDRATE 2 PUFF(S): 160; 4.5 AEROSOL RESPIRATORY (INHALATION) at 21:55

## 2023-02-19 RX ADMIN — Medication 1 PATCH: at 11:18

## 2023-02-19 RX ADMIN — Medication 40 MILLIGRAM(S): at 06:01

## 2023-02-19 RX ADMIN — Medication 650 MILLIGRAM(S): at 16:10

## 2023-02-19 RX ADMIN — HALOPERIDOL DECANOATE 0.5 MILLIGRAM(S): 100 INJECTION INTRAMUSCULAR at 22:01

## 2023-02-19 RX ADMIN — Medication 3 MILLILITER(S): at 19:48

## 2023-02-19 RX ADMIN — GABAPENTIN 300 MILLIGRAM(S): 400 CAPSULE ORAL at 06:01

## 2023-02-19 RX ADMIN — ENOXAPARIN SODIUM 40 MILLIGRAM(S): 100 INJECTION SUBCUTANEOUS at 21:56

## 2023-02-19 RX ADMIN — DORZOLAMIDE HYDROCHLORIDE TIMOLOL MALEATE 1 DROP(S): 20; 5 SOLUTION/ DROPS OPHTHALMIC at 17:44

## 2023-02-19 RX ADMIN — TAMSULOSIN HYDROCHLORIDE 0.4 MILLIGRAM(S): 0.4 CAPSULE ORAL at 21:56

## 2023-02-19 NOTE — PROGRESS NOTE ADULT - SUBJECTIVE AND OBJECTIVE BOX
Patient is a 67y old  Male who presents with a chief complaint of hypoxia (2023 14:13)      SUBJECTIVE / OVERNIGHT EVENTS: No acute events overnight. Now on 2-3L as opposed to 5-6L yesterday AM.     MEDICATIONS  (STANDING):  albuterol/ipratropium for Nebulization 3 milliLiter(s) Nebulizer every 12 hours  atorvastatin 10 milliGRAM(s) Oral at bedtime  budesonide 160 MICROgram(s)/formoterol 4.5 MICROgram(s) Inhaler 2 Puff(s) Inhalation two times a day  coronavirus bivalent (EUA) Booster Vaccine (PFIZER) 0.3 milliLiter(s) IntraMuscular once  dorzolamide 2%/timolol 0.5% Ophthalmic Solution 1 Drop(s) Both EYES two times a day  enoxaparin Injectable 40 milliGRAM(s) SubCutaneous every 24 hours  famotidine    Tablet 40 milliGRAM(s) Oral two times a day  gabapentin 300 milliGRAM(s) Oral two times a day  influenza  Vaccine (HIGH DOSE) 0.7 milliLiter(s) IntraMuscular once  latanoprost 0.005% Ophthalmic Solution 1 Drop(s) Both EYES at bedtime  losartan 50 milliGRAM(s) Oral daily  nicotine -   7 mG/24Hr(s) Patch 1 Patch Transdermal daily  predniSONE   Tablet 40 milliGRAM(s) Oral daily  tamsulosin 0.4 milliGRAM(s) Oral at bedtime  traZODone 100 milliGRAM(s) Oral at bedtime    MEDICATIONS  (PRN):  acetaminophen     Tablet .. 650 milliGRAM(s) Oral every 6 hours PRN Temp greater or equal to 38C (100.4F), Mild Pain (1 - 3)  cyclobenzaprine 5 milliGRAM(s) Oral three times a day PRN Muscle Spasm  haloperidol    Injectable 0.5 milliGRAM(s) IntraMuscular every 6 hours PRN Agitation      T(C): 36.9 (23 @ 10:00), Max: 36.9 (23 @ 10:00)  HR: 92 (23 @ 10:13) (87 - 95)  BP: 145/91 (23 @ 10:00) (145/91 - 179/88)  RR: 18 (23 @ 10:00) (17 - 20)  SpO2: 94% (23 @ 10:13) (91% - 99%)  CAPILLARY BLOOD GLUCOSE        I&O's Summary    2023 07:01  -  2023 07:00  --------------------------------------------------------  IN: 800 mL / OUT: 550 mL / NET: 250 mL    2023 07:01  -  2023 13:41  --------------------------------------------------------  IN: 270 mL / OUT: 250 mL / NET: 20 mL        PHYSICAL EXAM:  GENERAL: no apparent distress, on room air  EYES: EOMI, PERRLA, conjunctiva and sclera clear b/l  CHEST/LUNG: Clear to auscultation bilaterally; No wheezing or crackles  HEART: s1/s2, RRR, no murmurs appreciated  ABDOMEN: Soft, Nontender, Nondistended; Bowel sounds present  EXTREMITIES:  2+ Peripheral Pulses, No clubbing, cyanosis, or edema  MSK: no joint effusions  Back: no spinal tenderness  NEUROLOGY: awake, alert, no sensory or motor deficits, 5/5 muscle strength equal in all extremities, CN 2-12 grossly intact  PSYCH: calm, cooperative  SKIN: No rashes or lesions    LABS:                        16.0   11.25 )-----------( 231      ( 2023 05:36 )             48.3         137  |  102  |  14  ----------------------------<  170<H>  3.5   |  22  |  0.95    Ca    9.6      2023 05:36  Phos  3.6       Mg     2.20         TPro  7.6  /  Alb  4.3  /  TBili  0.4  /  DBili  x   /  AST  32  /  ALT  16  /  AlkPhos  109            Urinalysis Basic - ( 2023 18:18 )    Color: Light Yellow / Appearance: Clear / S.008 / pH: x  Gluc: x / Ketone: Negative  / Bili: Negative / Urobili: <2 mg/dL   Blood: x / Protein: Trace / Nitrite: Negative   Leuk Esterase: Negative / RBC: 0-2 /HPF / WBC 0-2 /HPF   Sq Epi: x / Non Sq Epi: x / Bacteria: Negative        RADIOLOGY & ADDITIONAL TESTS:

## 2023-02-20 ENCOUNTER — TRANSCRIPTION ENCOUNTER (OUTPATIENT)
Age: 68
End: 2023-02-20

## 2023-02-20 PROCEDURE — 99233 SBSQ HOSP IP/OBS HIGH 50: CPT

## 2023-02-20 RX ORDER — HALOPERIDOL DECANOATE 100 MG/ML
1 INJECTION INTRAMUSCULAR ONCE
Refills: 0 | Status: COMPLETED | OUTPATIENT
Start: 2023-02-20 | End: 2023-02-20

## 2023-02-20 RX ORDER — CYCLOBENZAPRINE HYDROCHLORIDE 10 MG/1
1 TABLET, FILM COATED ORAL
Qty: 0 | Refills: 0 | DISCHARGE

## 2023-02-20 RX ORDER — LIDOCAINE 4 G/100G
1 CREAM TOPICAL DAILY
Refills: 0 | Status: DISCONTINUED | OUTPATIENT
Start: 2023-02-20 | End: 2023-02-21

## 2023-02-20 RX ORDER — HALOPERIDOL DECANOATE 100 MG/ML
0.5 INJECTION INTRAMUSCULAR ONCE
Refills: 0 | Status: COMPLETED | OUTPATIENT
Start: 2023-02-20 | End: 2023-02-20

## 2023-02-20 RX ORDER — BUDESONIDE AND FORMOTEROL FUMARATE DIHYDRATE 160; 4.5 UG/1; UG/1
2 AEROSOL RESPIRATORY (INHALATION)
Qty: 1 | Refills: 0
Start: 2023-02-20 | End: 2023-03-21

## 2023-02-20 RX ORDER — OXYBUTYNIN CHLORIDE 5 MG
1 TABLET ORAL
Qty: 0 | Refills: 0 | DISCHARGE

## 2023-02-20 RX ORDER — GABAPENTIN 400 MG/1
1 CAPSULE ORAL
Qty: 0 | Refills: 0 | DISCHARGE

## 2023-02-20 RX ORDER — TRAZODONE HCL 50 MG
2 TABLET ORAL
Qty: 0 | Refills: 0 | DISCHARGE

## 2023-02-20 RX ORDER — ALBUTEROL 90 UG/1
1 AEROSOL, METERED ORAL
Qty: 1 | Refills: 0
Start: 2023-02-20 | End: 2023-03-21

## 2023-02-20 RX ORDER — LANOLIN ALCOHOL/MO/W.PET/CERES
6 CREAM (GRAM) TOPICAL ONCE
Refills: 0 | Status: DISCONTINUED | OUTPATIENT
Start: 2023-02-20 | End: 2023-02-20

## 2023-02-20 RX ORDER — OLANZAPINE 15 MG/1
2.5 TABLET, FILM COATED ORAL ONCE
Refills: 0 | Status: COMPLETED | OUTPATIENT
Start: 2023-02-20 | End: 2023-02-20

## 2023-02-20 RX ORDER — TIOTROPIUM BROMIDE 18 UG/1
2 CAPSULE ORAL; RESPIRATORY (INHALATION)
Qty: 1 | Refills: 0
Start: 2023-02-20 | End: 2023-03-21

## 2023-02-20 RX ORDER — OLANZAPINE 15 MG/1
5 TABLET, FILM COATED ORAL EVERY 6 HOURS
Refills: 0 | Status: DISCONTINUED | OUTPATIENT
Start: 2023-02-20 | End: 2023-02-21

## 2023-02-20 RX ADMIN — DORZOLAMIDE HYDROCHLORIDE TIMOLOL MALEATE 1 DROP(S): 20; 5 SOLUTION/ DROPS OPHTHALMIC at 17:20

## 2023-02-20 RX ADMIN — Medication 1 PATCH: at 05:07

## 2023-02-20 RX ADMIN — Medication 3 MILLILITER(S): at 19:59

## 2023-02-20 RX ADMIN — FAMOTIDINE 40 MILLIGRAM(S): 10 INJECTION INTRAVENOUS at 17:23

## 2023-02-20 RX ADMIN — Medication 40 MILLIGRAM(S): at 05:55

## 2023-02-20 RX ADMIN — Medication 1 PATCH: at 12:10

## 2023-02-20 RX ADMIN — OLANZAPINE 2.5 MILLIGRAM(S): 15 TABLET, FILM COATED ORAL at 10:26

## 2023-02-20 RX ADMIN — HALOPERIDOL DECANOATE 0.5 MILLIGRAM(S): 100 INJECTION INTRAMUSCULAR at 02:17

## 2023-02-20 RX ADMIN — CYCLOBENZAPRINE HYDROCHLORIDE 5 MILLIGRAM(S): 10 TABLET, FILM COATED ORAL at 05:55

## 2023-02-20 RX ADMIN — DORZOLAMIDE HYDROCHLORIDE TIMOLOL MALEATE 1 DROP(S): 20; 5 SOLUTION/ DROPS OPHTHALMIC at 05:56

## 2023-02-20 RX ADMIN — GABAPENTIN 300 MILLIGRAM(S): 400 CAPSULE ORAL at 17:19

## 2023-02-20 RX ADMIN — SENNA PLUS 2 TABLET(S): 8.6 TABLET ORAL at 21:20

## 2023-02-20 RX ADMIN — HALOPERIDOL DECANOATE 1 MILLIGRAM(S): 100 INJECTION INTRAMUSCULAR at 03:39

## 2023-02-20 RX ADMIN — Medication 1 PATCH: at 18:07

## 2023-02-20 RX ADMIN — BUDESONIDE AND FORMOTEROL FUMARATE DIHYDRATE 2 PUFF(S): 160; 4.5 AEROSOL RESPIRATORY (INHALATION) at 09:51

## 2023-02-20 RX ADMIN — BUDESONIDE AND FORMOTEROL FUMARATE DIHYDRATE 2 PUFF(S): 160; 4.5 AEROSOL RESPIRATORY (INHALATION) at 21:22

## 2023-02-20 RX ADMIN — Medication 1 PATCH: at 11:16

## 2023-02-20 RX ADMIN — LATANOPROST 1 DROP(S): 0.05 SOLUTION/ DROPS OPHTHALMIC; TOPICAL at 21:24

## 2023-02-20 RX ADMIN — LIDOCAINE 1 PATCH: 4 CREAM TOPICAL at 11:26

## 2023-02-20 RX ADMIN — OLANZAPINE 2.5 MILLIGRAM(S): 15 TABLET, FILM COATED ORAL at 11:27

## 2023-02-20 RX ADMIN — LIDOCAINE 1 PATCH: 4 CREAM TOPICAL at 23:00

## 2023-02-20 RX ADMIN — GABAPENTIN 300 MILLIGRAM(S): 400 CAPSULE ORAL at 05:55

## 2023-02-20 RX ADMIN — Medication 3 MILLILITER(S): at 08:16

## 2023-02-20 RX ADMIN — ATORVASTATIN CALCIUM 10 MILLIGRAM(S): 80 TABLET, FILM COATED ORAL at 21:20

## 2023-02-20 RX ADMIN — LOSARTAN POTASSIUM 50 MILLIGRAM(S): 100 TABLET, FILM COATED ORAL at 05:55

## 2023-02-20 RX ADMIN — FAMOTIDINE 40 MILLIGRAM(S): 10 INJECTION INTRAVENOUS at 05:55

## 2023-02-20 RX ADMIN — TAMSULOSIN HYDROCHLORIDE 0.4 MILLIGRAM(S): 0.4 CAPSULE ORAL at 21:20

## 2023-02-20 RX ADMIN — ENOXAPARIN SODIUM 40 MILLIGRAM(S): 100 INJECTION SUBCUTANEOUS at 21:44

## 2023-02-20 RX ADMIN — LIDOCAINE 1 PATCH: 4 CREAM TOPICAL at 18:07

## 2023-02-20 RX ADMIN — Medication 100 MILLIGRAM(S): at 21:20

## 2023-02-20 RX ADMIN — POLYETHYLENE GLYCOL 3350 17 GRAM(S): 17 POWDER, FOR SOLUTION ORAL at 11:26

## 2023-02-20 NOTE — CHART NOTE - NSCHARTNOTEFT_GEN_A_CORE
Night ACP signed out this AM that patient and his family members want to take patient home. Per night ACP pt is here for COPD exacerbation however pulling on lines and agitated despite being on haldo. Met with patient and family member this AM, patient was laying NAD with pulse ox reading intermittently on the toe, per RN ranges from 70-80% on RA when it does read. Pt agitated when placing NC tube on. Family member at bedside wishes to take patient home as patient has not been improving on the haldo for the past 2 days and wants to leave AMA, MD at bedside discussed with patient that even if they do leave AMA they will likely be a readmission as being without home O2 sating 70-80% would have consequences including death. Family members understands the risk wants to leave after establishing O2, CM involved and states O2 will likely be approved and delivered tomorrow. Family wants patient to leave today adamantly and states they will buy oxygen from private companies, I asissted patient in calling these companies, "[employer] at 7231032233 and [employer] at [phone], however they only provide O2 to companies and not individual buyers. Patient is more calm s/p zyprexa 5mg and tolerating O2 on 4L sating ~92-94%. Family still adamant of taking patient home, will await CM news about O2 prior to facilitating d/c AMA.    -Josiah BUI [id number]

## 2023-02-20 NOTE — DISCHARGE NOTE PROVIDER - NSDCCPCAREPLAN_GEN_ALL_CORE_FT
PRINCIPAL DISCHARGE DIAGNOSIS  Diagnosis: Acute respiratory failure with hypoxia  Assessment and Plan of Treatment: Due to COPD. Patient has low oxygen levels and needs to weak oxygen at all times. O2 level should be 88-94% at all times. Please follow up with pulmonoligist as soon as possible. Please return to hospital if patient not wearing oxygen. Prolonged lack of oxygen will result in death.      SECONDARY DISCHARGE DIAGNOSES  Diagnosis: AMS (altered mental status)  Assessment and Plan of Treatment: Suspect delirium due to hospitalization vs steroid use. Please return to hospital if patient refusing oxygen and treatment at home.     PRINCIPAL DISCHARGE DIAGNOSIS  Diagnosis: Acute respiratory failure with hypoxia  Assessment and Plan of Treatment: Due to COPD. Patient has low oxygen levels and needs to wear oxygen at all times. O2 level should be 88-94% at all times. Please follow up with pulmonoligist as soon as possible. Please return to hospital if patient not wearing oxygen. Prolonged lack of oxygen will result in death.      SECONDARY DISCHARGE DIAGNOSES  Diagnosis: AMS (altered mental status)  Assessment and Plan of Treatment: Suspect delirium due to hospitalization vs steroid use. Please return to hospital if patient refusing oxygen and treatment at home.     PRINCIPAL DISCHARGE DIAGNOSIS  Diagnosis: Acute respiratory failure with hypoxia  Assessment and Plan of Treatment: Due to COPD. Patient has low oxygen levels and needs to wear oxygen at all times. O2 level should be 88-94% at all times. Please follow up with pulmonoligist as soon as possible. Please return to hospital if patient not wearing oxygen. Prolonged lack of oxygen will result in death. You completed 5 day course of steroids, do not  the 1 day prednisone sent to your pharmacy, continue the inhalers.      SECONDARY DISCHARGE DIAGNOSES  Diagnosis: AMS (altered mental status)  Assessment and Plan of Treatment: Suspect delirium due to hospitalization vs steroid use. Please return to hospital if patient refusing oxygen and treatment at home.

## 2023-02-20 NOTE — PROGRESS NOTE ADULT - NSPROGADDITIONALINFOA_GEN_ALL_CORE
Daughter at bedside. Very frustrated with care last 2 days. Says that patient has remained agitated and refusing 02 but nothing has been done to address delirium. At baseline patient is functional and AAox3. Daughter wants home 02 setup and wants to take patient home. I explained that delirium likely due to hospitalization vs steroid use. Not hypercapnic and UA negative. At this time patient needs 02 for hypoxemia. Also explained that delirium may not resolve immediately at home and if patient refuses 02 at home, it will put patient at risk for death. Hence discharge today is not advisable. Daughter understands but wants to take patient home AMA after 02 setup.

## 2023-02-20 NOTE — PROVIDER CONTACT NOTE (OTHER) - ACTION/TREATMENT ORDERED:
Rito Ortega made aware. No new recommendations recommended at this time. Will continue to monitor the patient. Safety maintained.

## 2023-02-20 NOTE — DISCHARGE NOTE PROVIDER - CARE PROVIDER_API CALL
Katherine Clayton)  Critical Care Medicine; Internal Medicine; Pulmonary Disease  [address] [place] [address]  Phone: [phone]  Fax: [phone]  Follow Up Time:    Katherine Clayton)  Critical Care Medicine; Internal Medicine; Pulmonary Disease  [address] [place] [address]  Phone: [phone]  Fax: [phone]  Follow Up Time:     Lora Maxwell)  Family Medicine  [address] [place] [address]  Phone: [phone]  Fax: [phone]  Follow Up Time: 1 week

## 2023-02-20 NOTE — DISCHARGE NOTE PROVIDER - NSDCFUSCHEDAPPT_GEN_ALL_CORE_FT
Oziel Madison  [facility] Physician [facility]  UROLOGY [address] [facility]  Scheduled Appointment: 03/30/2023

## 2023-02-20 NOTE — DISCHARGE NOTE PROVIDER - CARE PROVIDERS DIRECT ADDRESSES
[email or web address]

## 2023-02-20 NOTE — DISCHARGE NOTE PROVIDER - NSDCMRMEDTOKEN_GEN_ALL_CORE_FT
Cosopt 2%-0.5% ophthalmic solution: 1 drop(s) to each affected eye 2 times a day  Flexeril 5 mg oral tablet: 1 tab(s) orally 3 times a day, As Needed  Flomax 0.4 mg oral capsule: 1 cap(s) orally once a day  gabapentin 300 mg oral capsule: 1 cap(s) orally 2 times a day  latanoprost 0.005% ophthalmic solution: 1 drop(s) to each affected eye once a day (in the evening)  Lipitor 10 mg oral tablet: 1 tab(s) orally once a day  losartan 50 mg oral tablet: 1 tab(s) orally once a day  oxybutynin 5 mg oral tablet: 1 tab(s) orally 2 times a day  Pepcid 40 mg oral tablet: 1 tab(s) orally 2 times a day  traZODone 100 mg oral tablet: 2 tab(s) orally once a day (at bedtime)   budesonide-formoterol 160 mcg-4.5 mcg/inh inhalation aerosol: 2 puff(s) inhaled 2 times a day   Cosopt 2%-0.5% ophthalmic solution: 1 drop(s) to each affected eye 2 times a day  Flomax 0.4 mg oral capsule: 1 cap(s) orally once a day  latanoprost 0.005% ophthalmic solution: 1 drop(s) to each affected eye once a day (in the evening)  Lipitor 10 mg oral tablet: 1 tab(s) orally once a day  losartan 50 mg oral tablet: 1 tab(s) orally once a day  Pepcid 40 mg oral tablet: 1 tab(s) orally 2 times a day  predniSONE 20 mg oral tablet: 2 tab(s) orally once a day  Spiriva Respimat 1.25 mcg/inh inhalation aerosol: 2 puff(s) inhaled once a day   Ventolin HFA 90 mcg/inh inhalation aerosol: 1 puff(s) inhaled every 6 hours as needed for shortness of breath.

## 2023-02-20 NOTE — DISCHARGE NOTE PROVIDER - PROVIDER TOKENS
PROVIDER:[TOKEN:[[id number]]] PROVIDER:[TOKEN:[[id number]]],PROVIDER:[TOKEN:[[id number]],FOLLOWUP:[1 week]]

## 2023-02-20 NOTE — PROGRESS NOTE ADULT - SUBJECTIVE AND OBJECTIVE BOX
[facility] Medicine  Dr. Georgette Brooks  Pager [phone]    Patient is a 67y old  Male who presents with a chief complaint of hypoxia (2023 14:13)      SUBJECTIVE / OVERNIGHT EVENTS: Seen and examined. family at bedside. Daughter very unhappy. States that patient has been agitated and pulling on NC for 2 days now and nothing has been done. Says that haldol does not work but he keeps getting same medication that is ineffective. Wants to take patient home today. Says that at baseline he is AAOx4 and functional. No h/o dementia. This change in mental status occured since admission. Thinks patient will be better at home. I explained that patient on RA desaturates to high 70s and needs 02 setup at home. I explained risk of death with prolonged hypoxemia. Per daughter insistent, CM to work on home 02 setup. I explained to daughter that she will need to take patient home AMA once 02 has been setup. She understands risks including death and wishes to take him danay. States that she will bring him back if MS does not improve and he continues to refuse 02 at home.    MEDICATIONS  (STANDING):  albuterol/ipratropium for Nebulization 3 milliLiter(s) Nebulizer every 12 hours  atorvastatin 10 milliGRAM(s) Oral at bedtime  budesonide 160 MICROgram(s)/formoterol 4.5 MICROgram(s) Inhaler 2 Puff(s) Inhalation two times a day  coronavirus bivalent (EUA) Booster Vaccine (PFIZER) 0.3 milliLiter(s) IntraMuscular once  dorzolamide 2%/timolol 0.5% Ophthalmic Solution 1 Drop(s) Both EYES two times a day  enoxaparin Injectable 40 milliGRAM(s) SubCutaneous every 24 hours  famotidine    Tablet 40 milliGRAM(s) Oral two times a day  gabapentin 300 milliGRAM(s) Oral two times a day  influenza  Vaccine (HIGH DOSE) 0.7 milliLiter(s) IntraMuscular once  latanoprost 0.005% Ophthalmic Solution 1 Drop(s) Both EYES at bedtime  losartan 50 milliGRAM(s) Oral daily  nicotine -   7 mG/24Hr(s) Patch 1 Patch Transdermal daily  predniSONE   Tablet 40 milliGRAM(s) Oral daily  tamsulosin 0.4 milliGRAM(s) Oral at bedtime  traZODone 100 milliGRAM(s) Oral at bedtime    MEDICATIONS  (PRN):  acetaminophen     Tablet .. 650 milliGRAM(s) Oral every 6 hours PRN Temp greater or equal to 38C (100.4F), Mild Pain (1 - 3)  cyclobenzaprine 5 milliGRAM(s) Oral three times a day PRN Muscle Spasm  haloperidol    Injectable 0.5 milliGRAM(s) IntraMuscular every 6 hours PRN Agitation      Vital Signs Last 24 Hrs  T(C): 36.3 (2023 05:55), Max: 37 (2023 17:30)  T(F): 97.4 (2023 05:55), Max: 98.6 (2023 17:30)  HR: 95 (2023 05:55) (87 - 98)  BP: 150/84 (2023 05:55) (150/84 - 173/118)  BP(mean): --  RR: 19 (2023 10:01) (18 - 25)  SpO2: 77% (2023 10:01) (77% - 96%)    Parameters below as of 2023 10:01  Patient On (Oxygen Delivery Method): room air    I&O's Summary    2023 07:01  -  2023 07:00  --------------------------------------------------------  IN: 1170 mL / OUT: 1200 mL / NET: -30 mL    2023 07:01  -  2023 13:56  --------------------------------------------------------  IN: 320 mL / OUT: 350 mL / NET: -30 mL      PHYSICAL EXAM:  GENERAL: confused, refusing 02, moving around the bed  EYES: EOMI, PERRLA, conjunctiva and sclera clear b/l  CHEST/LUNG: Clear to auscultation bilaterally; No wheezing or crackles  HEART: s1/s2, RRR, no murmurs appreciated  ABDOMEN: Soft, Nontender, Nondistended; Bowel sounds present  EXTREMITIES:  2+ Peripheral Pulses, No clubbing, cyanosis, or edema  MSK: no joint effusions  Back: no spinal tenderness  NEUROLOGY: awake, alert, no sensory or motor deficits, 5/5 muscle strength equal in all extremities, CN 2-12 grossly intact  PSYCH: agitated   SKIN: No rashes or lesions    LABS:                                   16.0   11.25 )-----------( 231      ( 2023 05:36 )             48.3   -    137  |  102  |  14  ----------------------------<  170<H>  3.5   |  22  |  0.95    Ca    9.6      2023 05:36  Phos  3.6     -  Mg     2.20     -    TPro  7.6  /  Alb  4.3  /  TBili  0.4  /  DBili  x   /  AST  32  /  ALT  16  /  AlkPhos  109  -    Urinalysis Basic - ( 2023 18:18 )    Color: Light Yellow / Appearance: Clear / S.008 / pH: x  Gluc: x / Ketone: Negative  / Bili: Negative / Urobili: <2 mg/dL   Blood: x / Protein: Trace / Nitrite: Negative   Leuk Esterase: Negative / RBC: 0-2 /HPF / WBC 0-2 /HPF   Sq Epi: x / Non Sq Epi: x / Bacteria: Negative        RADIOLOGY & ADDITIONAL TESTS:

## 2023-02-20 NOTE — CHART NOTE - NSCHARTNOTEFT_GEN_A_CORE
Pt presents wit COPD exacerbation, O2 resting sating 77%. Pt will need home oxygen on discharge.    Josiah BUI [id number] Pt presents wit COPD exacerbation, O2 resting (on room air) sating 77%. Pt will need home oxygen on discharge.    Josiah BUI [id number]

## 2023-02-20 NOTE — DISCHARGE NOTE PROVIDER - HOSPITAL COURSE
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 Initially needed HFNC then was able to be down tritated to NC4L. Patient was seen by pulmonary and was recommended to be treated for COPD exacerbation. Patient will need home 02 due to severe emphysema. Patient also noted to have pulm HTN on TTE and will need close follow up with pulm. Course complicated by delirium likely hospitalization related. Patient agitated and pulling on lines and oxygen, No improvement in haldol. Responded well to zyprexa and noted to be calmer. Patient's daughter however adamant on taking patient home. I explained to her risk of death given hypoxemia and given patient continues to pull on NC. Family however not open to any further discussion and wishes to take patient home. Daughter understands risk of death given severe COPD exacerbation and hypoxia and understanding these risks still wishes to signout AMA. 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 Initially needed HFNC then was able to be down tritated to NC4L. Patient was seen by pulmonary and was recommended to be treated for COPD exacerbation. Patient will need home 02 due to severe emphysema. Patient also noted to have pulm HTN on TTE and will need close follow up with pulm. Course complicated by delirium likely hospitalization related. Patient agitated and pulling on lines and oxygen, No improvement in haldol. Responded well to zyprexa and noted to be calmer. Patient's daughter however adamant on taking patient home. I explained to her risk of death given hypoxemia and given patient continues to pull on NC. Family however not open to any further discussion and wishes to take patient home. Daughter understands risk of death given severe COPD exacerbation and hypoxia and understanding these risks still wishes to signout AMA, form filled and placed in chart.    Oxygen delivered to bedside prior to D/C, CM help appreciated.    On 2/20/23, discussed with Dr. Brooks, patient is medically cleared and optimized for discharge today. All medications were reviewed with attending, and sent to mutually agreed upon pharmacy.  Reviewed discharge medications with patient; All new medications requiring new prescription sent to pharmacy of patients choice. Reviewed need for prescription for previous home medications and new prescriptions sent if requested. Patient in agreement and understands.     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 Initially needed HFNC then was able to be down tritated to NC4L. Patient was seen by pulmonary and was recommended to be treated for COPD exacerbation. Patient will need home 02 due to severe emphysema. Patient also noted to have pulm HTN on TTE and will need close follow up with pulm. Course complicated by delirium likely hospitalization related. Patient agitated and pulling on lines and oxygen, No improvement in haldol. Responded well to zyprexa and noted to be calmer. Initially family wanted to take patient home AMA, however we discussed with patient risk of death given hypoxemia and given patient's agitation, AMA form was signed and placed in chart, however they were amendable to wait for oxygen to be set up, CM help appreciated. Oxygen was able to be set up by CM, oxygen delivered to bedside prior to D/C, CM help appreciated, on ambulation, pulse ox went to 92% on 4L, stable, medically cleared for discharge.    On 2/21/23, discussed with Dr. Mazariegos, patient is medically cleared and optimized for discharge today. All medications were reviewed with attending, and sent to mutually agreed upon pharmacy.  Reviewed discharge medications with patient; All new medications requiring new prescription sent to pharmacy of patients choice. Reviewed need for prescription for previous home medications and new prescriptions sent if requested. Patient in agreement and understands.     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 Initially needed HFNC then was able to be down tritated to NC4L. Patient was seen by pulmonary and was recommended to be treated for COPD exacerbation. Patient will need home 02 due to severe emphysema. Patient also noted to have pulm HTN on TTE and will need close follow up with pulm. Course complicated by delirium likely hospitalization related. Patient agitated and pulling on lines and oxygen, No improvement in haldol. Responded well to zyprexa and noted to be calmer. Initially family wanted to take patient home AMA, however we discussed with patient risk of death given hypoxemia and given patient's agitation, AMA form was signed and placed in chart, however they were amendable to wait for oxygen to be set up, CM help appreciated. Oxygen was able to be set up by CM, oxygen delivered to bedside prior to D/C, CM help appreciated, on ambulation, pulse ox went to 92% on 4L, stable, medically cleared for discharge.    - Completed steroid course, do not need to take anymore.    On 2/21/23, discussed with Dr. Mazariegos, patient is medically cleared and optimized for discharge today. All medications were reviewed with attending, and sent to mutually agreed upon pharmacy.  Reviewed discharge medications with patient; All new medications requiring new prescription sent to pharmacy of patients choice. Reviewed need for prescription for previous home medications and new prescriptions sent if requested. Patient in agreement and understands.

## 2023-02-21 ENCOUNTER — TRANSCRIPTION ENCOUNTER (OUTPATIENT)
Age: 68
End: 2023-02-21

## 2023-02-21 VITALS
TEMPERATURE: 98 F | SYSTOLIC BLOOD PRESSURE: 149 MMHG | DIASTOLIC BLOOD PRESSURE: 99 MMHG | RESPIRATION RATE: 17 BRPM | HEART RATE: 105 BPM | OXYGEN SATURATION: 97 %

## 2023-02-21 LAB
ANION GAP SERPL CALC-SCNC: 14 MMOL/L — SIGNIFICANT CHANGE UP (ref 7–14)
BASE EXCESS BLDV CALC-SCNC: -1.8 MMOL/L — SIGNIFICANT CHANGE UP (ref -2–3)
BASOPHILS # BLD AUTO: 0.06 K/UL — SIGNIFICANT CHANGE UP (ref 0–0.2)
BASOPHILS NFR BLD AUTO: 0.4 % — SIGNIFICANT CHANGE UP (ref 0–2)
BUN SERPL-MCNC: 19 MG/DL — SIGNIFICANT CHANGE UP (ref 7–23)
CA-I SERPL-SCNC: 1.26 MMOL/L — SIGNIFICANT CHANGE UP (ref 1.15–1.33)
CALCIUM SERPL-MCNC: 9.4 MG/DL — SIGNIFICANT CHANGE UP (ref 8.4–10.5)
CHLORIDE BLDV-SCNC: 100 MMOL/L — SIGNIFICANT CHANGE UP (ref 96–108)
CHLORIDE SERPL-SCNC: 100 MMOL/L — SIGNIFICANT CHANGE UP (ref 98–107)
CO2 BLDV-SCNC: 24.3 MMOL/L — SIGNIFICANT CHANGE UP (ref 22–26)
CO2 SERPL-SCNC: 22 MMOL/L — SIGNIFICANT CHANGE UP (ref 22–31)
CREAT SERPL-MCNC: 0.88 MG/DL — SIGNIFICANT CHANGE UP (ref 0.5–1.3)
EGFR: 93 ML/MIN/1.73M2 — SIGNIFICANT CHANGE UP
EOSINOPHIL # BLD AUTO: 0.14 K/UL — SIGNIFICANT CHANGE UP (ref 0–0.5)
EOSINOPHIL NFR BLD AUTO: 1 % — SIGNIFICANT CHANGE UP (ref 0–6)
GAS PNL BLDV: 136 MMOL/L — SIGNIFICANT CHANGE UP (ref 136–145)
GAS PNL BLDV: SIGNIFICANT CHANGE UP
GLUCOSE BLDV-MCNC: 172 MG/DL — HIGH (ref 70–99)
GLUCOSE SERPL-MCNC: 147 MG/DL — HIGH (ref 70–99)
HCO3 BLDV-SCNC: 23 MMOL/L — SIGNIFICANT CHANGE UP (ref 22–29)
HCT VFR BLD CALC: 51.9 % — HIGH (ref 39–50)
HCT VFR BLDA CALC: 54 % — HIGH (ref 39–51)
HGB BLD CALC-MCNC: 18 G/DL — HIGH (ref 12.6–17.4)
HGB BLD-MCNC: 17.2 G/DL — HIGH (ref 13–17)
IANC: 8.66 K/UL — HIGH (ref 1.8–7.4)
IMM GRANULOCYTES NFR BLD AUTO: 0.4 % — SIGNIFICANT CHANGE UP (ref 0–0.9)
LACTATE BLDV-MCNC: 2.9 MMOL/L — HIGH (ref 0.5–2)
LYMPHOCYTES # BLD AUTO: 24.4 % — SIGNIFICANT CHANGE UP (ref 13–44)
LYMPHOCYTES # BLD AUTO: 3.31 K/UL — HIGH (ref 1–3.3)
MAGNESIUM SERPL-MCNC: 2.3 MG/DL — SIGNIFICANT CHANGE UP (ref 1.6–2.6)
MCHC RBC-ENTMCNC: 32.6 PG — SIGNIFICANT CHANGE UP (ref 27–34)
MCHC RBC-ENTMCNC: 33.1 GM/DL — SIGNIFICANT CHANGE UP (ref 32–36)
MCV RBC AUTO: 98.5 FL — SIGNIFICANT CHANGE UP (ref 80–100)
MONOCYTES # BLD AUTO: 1.32 K/UL — HIGH (ref 0–0.9)
MONOCYTES NFR BLD AUTO: 9.7 % — SIGNIFICANT CHANGE UP (ref 2–14)
NEUTROPHILS # BLD AUTO: 8.66 K/UL — HIGH (ref 1.8–7.4)
NEUTROPHILS NFR BLD AUTO: 64.1 % — SIGNIFICANT CHANGE UP (ref 43–77)
NRBC # BLD: 0 /100 WBCS — SIGNIFICANT CHANGE UP (ref 0–0)
NRBC # FLD: 0 K/UL — SIGNIFICANT CHANGE UP (ref 0–0)
PCO2 BLDV: 39 MMHG — LOW (ref 42–55)
PH BLDV: 7.38 — SIGNIFICANT CHANGE UP (ref 7.32–7.43)
PHOSPHATE SERPL-MCNC: 4.1 MG/DL — SIGNIFICANT CHANGE UP (ref 2.5–4.5)
PLATELET # BLD AUTO: 219 K/UL — SIGNIFICANT CHANGE UP (ref 150–400)
PO2 BLDV: 78 MMHG — HIGH (ref 25–45)
POTASSIUM BLDV-SCNC: 3.8 MMOL/L — SIGNIFICANT CHANGE UP (ref 3.5–5.1)
POTASSIUM SERPL-MCNC: 3.8 MMOL/L — SIGNIFICANT CHANGE UP (ref 3.5–5.3)
POTASSIUM SERPL-SCNC: 3.8 MMOL/L — SIGNIFICANT CHANGE UP (ref 3.5–5.3)
RBC # BLD: 5.27 M/UL — SIGNIFICANT CHANGE UP (ref 4.2–5.8)
RBC # FLD: 13.2 % — SIGNIFICANT CHANGE UP (ref 10.3–14.5)
SAO2 % BLDV: 94.4 % — HIGH (ref 67–88)
SODIUM SERPL-SCNC: 136 MMOL/L — SIGNIFICANT CHANGE UP (ref 135–145)
WBC # BLD: 13.55 K/UL — HIGH (ref 3.8–10.5)
WBC # FLD AUTO: 13.55 K/UL — HIGH (ref 3.8–10.5)

## 2023-02-21 PROCEDURE — 99239 HOSP IP/OBS DSCHRG MGMT >30: CPT

## 2023-02-21 RX ADMIN — OLANZAPINE 5 MILLIGRAM(S): 15 TABLET, FILM COATED ORAL at 00:20

## 2023-02-21 RX ADMIN — Medication 1 PATCH: at 07:00

## 2023-02-21 RX ADMIN — POLYETHYLENE GLYCOL 3350 17 GRAM(S): 17 POWDER, FOR SOLUTION ORAL at 11:53

## 2023-02-21 RX ADMIN — GABAPENTIN 300 MILLIGRAM(S): 400 CAPSULE ORAL at 17:11

## 2023-02-21 RX ADMIN — Medication 1 PATCH: at 18:32

## 2023-02-21 RX ADMIN — FAMOTIDINE 40 MILLIGRAM(S): 10 INJECTION INTRAVENOUS at 17:11

## 2023-02-21 RX ADMIN — BUDESONIDE AND FORMOTEROL FUMARATE DIHYDRATE 2 PUFF(S): 160; 4.5 AEROSOL RESPIRATORY (INHALATION) at 08:41

## 2023-02-21 RX ADMIN — LOSARTAN POTASSIUM 50 MILLIGRAM(S): 100 TABLET, FILM COATED ORAL at 06:18

## 2023-02-21 RX ADMIN — DORZOLAMIDE HYDROCHLORIDE TIMOLOL MALEATE 1 DROP(S): 20; 5 SOLUTION/ DROPS OPHTHALMIC at 17:11

## 2023-02-21 RX ADMIN — LIDOCAINE 1 PATCH: 4 CREAM TOPICAL at 11:53

## 2023-02-21 RX ADMIN — GABAPENTIN 300 MILLIGRAM(S): 400 CAPSULE ORAL at 06:18

## 2023-02-21 RX ADMIN — Medication 1 PATCH: at 11:52

## 2023-02-21 RX ADMIN — OLANZAPINE 5 MILLIGRAM(S): 15 TABLET, FILM COATED ORAL at 12:57

## 2023-02-21 RX ADMIN — DORZOLAMIDE HYDROCHLORIDE TIMOLOL MALEATE 1 DROP(S): 20; 5 SOLUTION/ DROPS OPHTHALMIC at 06:21

## 2023-02-21 RX ADMIN — Medication 1 PATCH: at 11:35

## 2023-02-21 RX ADMIN — Medication 3 MILLILITER(S): at 09:48

## 2023-02-21 RX ADMIN — Medication 40 MILLIGRAM(S): at 06:18

## 2023-02-21 RX ADMIN — LIDOCAINE 1 PATCH: 4 CREAM TOPICAL at 18:32

## 2023-02-21 RX ADMIN — FAMOTIDINE 40 MILLIGRAM(S): 10 INJECTION INTRAVENOUS at 06:18

## 2023-02-21 NOTE — DISCHARGE NOTE NURSING/CASE MANAGEMENT/SOCIAL WORK - NSDCCRNUMBER_GEN_ALL_CORE_FT
[phone]- made aware for reinstatement cdpap hha- family to take 24hr/7day responsibility for pt and care

## 2023-02-21 NOTE — PROGRESS NOTE ADULT - PROBLEM SELECTOR PLAN 8
-reports h/o back pain with spondylolisthesis and generalized neuropathy  -at home takes gabapentin 300bid and PRN flexeril  -continue with home regimen  -[place] alonzo
-c/w flomax   - holding home Oxybutynin in setting of AMS
-reports h/o back pain with spondylolisthesis and generalized neuropathy  -at home takes gabapentin 300bid and PRN flexeril  -continue with home regimen  -[place] alonzo

## 2023-02-21 NOTE — PROGRESS NOTE ADULT - PROBLEM SELECTOR PLAN 6
- c/w eye drops Cosopt and latanoprost
BP elevated  - c/w home losartan 50mg  - consider adding amlodipine if remains elevated   - monitor bp

## 2023-02-21 NOTE — DISCHARGE NOTE NURSING/CASE MANAGEMENT/SOCIAL WORK - NSDCPEFALRISK_GEN_ALL_CORE
For information on Fall & Injury Prevention, visit: [email or web address] OR  [email or web address] OR  https://www.cdc.gov/steadi/patient.html

## 2023-02-21 NOTE — PROGRESS NOTE ADULT - PROBLEM SELECTOR PLAN 9
-c/w flomax   - holding home Oxybutynin in setting of AMS
-c/w flomax   - holding home Oxybutynin in setting of AMS - restart at home.
-c/w home lipitor
-c/w flomax   - holding home Oxybutynin in setting of AMS
-c/w flomax   - holding home Oxybutynin in setting of AMS

## 2023-02-21 NOTE — PROGRESS NOTE ADULT - PROBLEM SELECTOR PROBLEM 5
Mycobacterium avium infection
HTN (hypertension)
Mycobacterium avium infection

## 2023-02-21 NOTE — PROVIDER CONTACT NOTE (OTHER) - DATE AND TIME:
19-Feb-2023 23:43
20-Feb-2023 03:29
20-Feb-2023 01:50
21-Feb-2023 06:20
18-Feb-2023 00:30
19-Feb-2023 10:40
20-Feb-2023 10:00
20-Feb-2023 07:34
20-Feb-2023 21:25

## 2023-02-21 NOTE — DISCHARGE NOTE NURSING/CASE MANAGEMENT/SOCIAL WORK - NSDCPEWEB_GEN_ALL_CORE
[facility] for Tobacco Control website --- [email or web address] website --- [email or web address]

## 2023-02-21 NOTE — PROGRESS NOTE ADULT - PROBLEM SELECTOR PROBLEM 9
BPH (benign prostatic hyperplasia)
HLD (hyperlipidemia)

## 2023-02-21 NOTE — PROGRESS NOTE ADULT - PROBLEM SELECTOR PLAN 12
DVTppx: lovenox  Dispo: PT consult   Code: FULL CODE

## 2023-02-21 NOTE — PROVIDER CONTACT NOTE (OTHER) - NAME OF MD/NP/PA/DO NOTIFIED:
acp joe
Rito Ortega
ACP RANIVIR
Rito Ortega
jackeline rush
HUMBERTO Rahman
Maisha
Rito Ortega
acp joe

## 2023-02-21 NOTE — PROGRESS NOTE ADULT - SUBJECTIVE AND OBJECTIVE BOX
Hospitalist Progress Note  Rekha Mazariegos MD Pager # [phone]    OVERNIGHT EVENTS: AFIA    SUBJECTIVE / INTERVAL HPI: Patient seen and examined at bedside. Daughter is at bedside who provides interpretation Patient reports no aches/pains. He feels well. No complaints. Daughter states she still wants to take him home today and will sign him out against medical advice if she needs to.     VITAL SIGNS:  Vital Signs Last 24 Hrs  T(C): 36.5 (21 Feb 2023 06:15), Max: 36.8 (20 Feb 2023 14:00)  T(F): 97.7 (21 Feb 2023 06:15), Max: 98.3 (20 Feb 2023 14:00)  HR: 116 (21 Feb 2023 06:15) (90 - 116)  BP: 143/103 (21 Feb 2023 06:15) (143/103 - 152/106)  BP(mean): --  RR: 18 (21 Feb 2023 06:15) (18 - 19)  SpO2: 96% (21 Feb 2023 06:15) (92% - 97%)    Parameters below as of 21 Feb 2023 09:48  Patient On (Oxygen Delivery Method): nasal cannula        PHYSICAL EXAM:    General: Thin appearing male resting in bed   HEENT: NC/AT; PERRL, anicteric sclera; MMM  Neck: supple  Cardiovascular: +S1/S2; RRR  Respiratory: CTA B/L; no W/R/R - distant breath sounds  Gastrointestinal: soft, NT/ND; +BSx4  Extremities: WWP; no edema, clubbing or cyanosis  Vascular: 2+ radial, DP/PT pulses B/L  Neurological: AAOx3; no focal deficits    MEDICATIONS:  MEDICATIONS  (STANDING):  albuterol/ipratropium for Nebulization 3 milliLiter(s) Nebulizer every 12 hours  atorvastatin 10 milliGRAM(s) Oral at bedtime  budesonide 160 MICROgram(s)/formoterol 4.5 MICROgram(s) Inhaler 2 Puff(s) Inhalation two times a day  coronavirus bivalent (EUA) Booster Vaccine (PFIZER) 0.3 milliLiter(s) IntraMuscular once  dorzolamide 2%/timolol 0.5% Ophthalmic Solution 1 Drop(s) Both EYES two times a day  enoxaparin Injectable 40 milliGRAM(s) SubCutaneous every 24 hours  famotidine    Tablet 40 milliGRAM(s) Oral two times a day  gabapentin 300 milliGRAM(s) Oral two times a day  influenza  Vaccine (HIGH DOSE) 0.7 milliLiter(s) IntraMuscular once  latanoprost 0.005% Ophthalmic Solution 1 Drop(s) Both EYES at bedtime  lidocaine   4% Patch 1 Patch Transdermal daily  losartan 50 milliGRAM(s) Oral daily  nicotine -   7 mG/24Hr(s) Patch 1 Patch Transdermal daily  polyethylene glycol 3350 17 Gram(s) Oral daily  senna 2 Tablet(s) Oral at bedtime  tamsulosin 0.4 milliGRAM(s) Oral at bedtime  traZODone 100 milliGRAM(s) Oral at bedtime    MEDICATIONS  (PRN):  acetaminophen     Tablet .. 650 milliGRAM(s) Oral every 6 hours PRN Temp greater or equal to 38C (100.4F), Mild Pain (1 - 3)  cyclobenzaprine 5 milliGRAM(s) Oral three times a day PRN Muscle Spasm  OLANZapine 5 milliGRAM(s) Oral every 6 hours PRN agitation      ALLERGIES:  Allergies    No Known Allergies    Intolerances        LABS:                        17.2   13.55 )-----------( 219      ( 21 Feb 2023 06:15 )             51.9     02-21    136  |  100  |  19  ----------------------------<  147<H>  3.8   |  22  |  0.88    Ca    9.4      21 Feb 2023 06:15  Phos  4.1     02-21  Mg     2.30     02-21          CAPILLARY BLOOD GLUCOSE          RADIOLOGY & ADDITIONAL TESTS: Reviewed.    ASSESSMENT:    PLAN:

## 2023-02-21 NOTE — PROGRESS NOTE ADULT - PROBLEM SELECTOR PLAN 5
h/o MAC infection.  -was seen by pulm as outpatient and started on treatment. however patient did not finish regimen and lost to follow up  -CT with resolving upper lobe opacities  [ ] AFB sputum smear neg x3, f/u culture  -appreciate pulm, MAC unlikely to related to admission, no need to restart treatment
h/o MAC infection.  -was seen by pulm as outpatient and started on treatment. however patient did not finish regimen and lost to follow up  -CT with resolving upper lobe opacities  [ ] AFB sputum smear neg x3, f/u culture  -appreciate pulm, MAC unlikely to related to admission, no need to restart treatment
BP elevated  - c/w home losartan 50mg  - consider adding amlodipine if remains elevated   - monitor bp
h/o MAC infection.  -was seen by pulm as outpatient and started on treatment. however patient did not finish regimen and lost to follow up  -CT with resolving upper lobe opacities  [ ] AFB sputum smear neg x3, f/u culture  -appreciate pulm, MAC unlikely to related to admission, no need to restart treatment
h/o MAC infection.  -was seen by pulm as outpatient and started on treatment. however patient did not finish regimen and lost to follow up  -CT with resolving upper lobe opacities  [ ] AFB sputum smear neg x3, f/u culture  -appreciate pulm, MAC unlikely to related to admission, no need to restart treatment

## 2023-02-21 NOTE — PROGRESS NOTE ADULT - PROBLEM SELECTOR PROBLEM 4
Severe pulmonary hypertension
Mycobacterium avium infection
Severe pulmonary hypertension

## 2023-02-21 NOTE — DISCHARGE NOTE NURSING/CASE MANAGEMENT/SOCIAL WORK - NSDCPEEMAIL_GEN_ALL_CORE
[facility] email [email or web address]

## 2023-02-21 NOTE — PROGRESS NOTE ADULT - PROBLEM SELECTOR PLAN 4
Most likely type 3 consistent with h/o COPD as driving etiology.   - supportive care and therapy of COPD likely to be of most benefit along with supplemental O2 at home  - appreciate Pulm recs
h/o MAC infection.  -was seen by pulm as outpatient and started on treatment. however patient did not finish regimen and lost to follow up  -CT with resolving upper lobe opacities  [ ] AFB sputum smear neg x3, f/u culture  -appreciate pulm, MAC unlikely to related to admission, no need to restart treatment
Most likely type 3 consistent with h/o COPD as driving etiology.   - supportive care and therapy of COPD likely to be of most benefit along with supplemental O2 at home  - appreciate Pulm recs

## 2023-02-21 NOTE — CHART NOTE - NSCHARTNOTEFT_GEN_A_CORE
Will sign out to night ACP, discussed with Dr. Mazariegos. amulatory sats stable on 4L, medically ready to be discharged if portable home O2 set up. Night ACP to reach out to CM who is working OT. Continue inhalers on d/c.    Josiah BUI [id number]

## 2023-02-21 NOTE — PROVIDER CONTACT NOTE (OTHER) - SITUATION
Admitted for acute respiratory failure with hypoxia.
patient agitated, kept pulling oxygen cannula out, O2 saturation went down to 77% on room air
patient family requested that we try to obtain morning labs later as patient finally just calmed down and does not want to be disturbed
patient was noted with desaturation of 88% to 89% with oxygen 2L/min via n/c
Admitted for acute respiratory failure with hypoxia
patient agitated and restless. unable to sleep. pulling at tele wires and NC
Admitted for acute respiratory failure with hypoxia.
patient combative, refusing tele, pulling off NC
patient nighttime /93. attempted to retake BP but patient extremely restless at present time and unable to get accurate reading as he is not able to keep arms or legs still

## 2023-02-21 NOTE — PROGRESS NOTE ADULT - PROBLEM SELECTOR PLAN 1
Presenting with hypoxia to 85 on RA at PCP office, likely chronically hypoxemic given elevated Hgb   - was noted to be 89% on 6L NC in ED and was transitioned to high flow NC  - titrate to goal SPO2 92%; has come down in O2 requirements in past 24 hours, will continue to titrate down as tolerated, possible he won't need O2 upon discharge   - RVP negative  - CTA chest with emphysema, but otherwise clear lungs   - c/w standing duonebs, Symbicort and prednisone for possible COPD exacerbation
Presenting with hypoxia to 85 on RA at PCP office, likely chronically hypoxemic given elevated Hgb   - was noted to be 89% on 6L NC in ED and was transitioned to high flow NC  - titrate to goal SPO2 92%  - RVP negative  - CTA chest with emphysema, but otherwise clear lungs   - c/w standing duonebs, Symbicort and prednisone for possible COPD exacerbation  -pulm input appreciated.  -daughter wants to take patient home today. Will have CM setup home 02. Pending. Will perform ambulatory O2 sat if patient is cooperative to determine if he desaturates on oxygen or if NC is enough to maintain adequate oxygen saturation that is safe for discharge.
Presenting with hypoxia to 85 on RA at PCP office, likely chronically hypoxemic given elevated Hgb   - was noted to be 89% on 6L NC in ED and was transitioned to high flow NC  - titrate to goal SPO2 92%; will try to wean down again today though most likely will need to be discharged with O2  - RVP negative  - CTA chest with emphysema, but otherwise clear lungs   - c/w standing duonebs, Symbicort and prednisone for possible COPD exacerbation
Presenting with hypoxia to 85 on RA at PCP office, likely chronically hypoxemic given elevated Hgb   - was noted to be 89% on 6L NC in ED and was transitioned to high flow NC  - titrate to goal SPO2 92%; has come down in O2 requirements in past 24 hours, will continue to titrate down as tolerated  - RVP negative  - CTA chest with emphysema, but otherwise clear lungs   - c/w standing duonebs, Symbicort and prednisone for possible COPD exacerbation  -pulm input appreciated.  -daughter wants to take patient home today. Will have CM setup home 02. Discharge will be against medical advice as patient remains hypoxic and refusing 02 due to delirium. Unsafe discharge home. Daughter understands risks including death.
Presenting with hypoxia to 85 on RA at PCP office, likely chronically hypoxemic given elevated Hgb   - was noted to be 89% on 6L NC in ED and was transitioned to high flow NC  - titrate to goal SPO2 92%  - RVP negative  - CTA chest with emphysema, but otherwise clear lungs   - c/w standing duonebs, Symbicort and prednisone for possible COPD exacerbation

## 2023-02-21 NOTE — DISCHARGE NOTE NURSING/CASE MANAGEMENT/SOCIAL WORK - PATIENT PORTAL LINK FT
You can access the FollowMyHealth Patient Portal offered by [facility] by registering at the following website: [email or web address]. By joining [employer]’s FollowMyHealth portal, you will also be able to view your health information using other applications (apps) compatible with our system.

## 2023-02-21 NOTE — PROGRESS NOTE ADULT - PROBLEM SELECTOR PROBLEM 11
GERD (gastroesophageal reflux disease)
GERD (gastroesophageal reflux disease)
Prophylactic measure
GERD (gastroesophageal reflux disease)
GERD (gastroesophageal reflux disease)

## 2023-02-21 NOTE — DISCHARGE NOTE NURSING/CASE MANAGEMENT/SOCIAL WORK - NSDCDMETYPESERV_GEN_ALL_CORE_FT
home 02 portable tank and home concentrator/ [employer]ait[employer] liasion states order approved for delivery today home 02 portable tank and home concentrator/ [employer]lin liaison aware of delivery today and tonight home concentrator

## 2023-02-21 NOTE — PROGRESS NOTE ADULT - PROBLEM SELECTOR PLAN 7
- c/w eye drops Cosopt and latanoprost
- c/w eye drops Cosopt and latanoprost
-reports h/o back pain with spondylolisthesis and generalized neuropathy  -at home takes gabapentin 300bid and PRN flexeril  -continue with home regimen  -[place] alonzo
- c/w eye drops Cosopt and latanoprost
- c/w eye drops Cosopt and latanoprost

## 2023-02-21 NOTE — PROVIDER CONTACT NOTE (OTHER) - RECOMMENDATIONS
Rito Ortega made aware. No new recommendations recommended at this time.
notify provider. relay message to day shift rn
provider aware
Rito Ortega made aware. IV hydralazine given as per orders.
Rito Ortega made aware. No new recommendations recommended at this time.
provider aware
provider aware. to reassess patient

## 2023-02-21 NOTE — PROGRESS NOTE ADULT - PROBLEM SELECTOR PLAN 2
Unclear etiology, possible hospital induced delirium although family states patient has been restless for last 5 days. -  VBG on admission with pCO2 52, ABG does not show hypercapnia   - CTH unremarkable  - UA negative
Unclear etiology, possible hospital induced delirium although family states patient has been restless for last 5 days. -  VBG on admission with pCO2 52, ABG does not show hypercapnia   - CTH unremarkable  - UA negative  -haldol ineffective per daughter  -trial of zyprexa today to calm patient as he is refusing 02 and remains hypoxic.
Unclear etiology, possible hospital induced delirium although family states patient has been restless for last 5 days. Possible hypercapnia?  Currently pulling at NC  VBG on admission with pCO2 52, will repeat ABG now   - CTH unremarkable  - UA pending
Unclear etiology, possible hospital induced delirium although family states patient has been restless for last 5 days. -  VBG on admission with pCO2 52, ABG does not show hypercapnia   - CTH unremarkable  - UA negative  -haldol ineffective per daughter  - zyprexa helped with agitation - less agitated today.
Unclear etiology, possible hospital induced delirium although family states patient has been restless for last 5 days. Possible hypercapnia?  Currently pulling at NC  VBG on admission with pCO2 52, will repeat ABG now   [ ] UA, CTH pending

## 2023-02-21 NOTE — PROGRESS NOTE ADULT - PROBLEM SELECTOR PLAN 3
-patient with long standing smoking history. Current active smoker  - CT with emphysema. No official prior diagnosis. Not on any meds at home  - c/w duonebs, Symbicort, prednisone  - pulm consult appreciated   - goal spo2 92%, deescalate 02 as tolerated

## 2023-02-21 NOTE — PROVIDER CONTACT NOTE (OTHER) - REASON
desaturation
patient family requested that we try to obtain morning labs later as patient finally just calmed down and does not want to be disturbed.
Patient's /106. Patient restless, but has no complains of chest pain.
desaturation
Patient's /103 and . Patient confused and restless, but has no complains of dizziness or chest pain.
Patient's /110. Patient had no complains of chest pain.
patient combative, refusing tele, pulling off NC
patient nighttime /93
patient agitated and restless. unable to sleep. pulling at tele wires and NC

## 2023-02-21 NOTE — PROVIDER CONTACT NOTE (OTHER) - BACKGROUND
History if BPH, anxiety, hypertension, and cigarette smoker.
History of hypertension, cigarette smoker, anxiety, and benign prostatic hypertrophy.
hypoxia
History of hypertension, anxiety, cigarette smoker, and benign prostatic hypertrophy
hypoxia

## 2023-02-21 NOTE — PROVIDER CONTACT NOTE (OTHER) - ASSESSMENT
Vital signs are as follow:  /110  HR 84  Temp 84  SpO2 97%
patient is on 1:1 for safety, anxiety and restlessness a&OXx3. no s/s of acute distress.
patient agitated, kept pulling oxygen cannula out, O2 saturation went down to 77% on room air, no acute distress noted at this time
patient is on 1:1 for safety, anxiety and restlessness a&OXx3. no s/s of acute distress.
A&OX3 less restless and agitated. no s/s of acute distress
patient is on 1:1 for safety, anxiety and restlessness, no acute distress noted, denies chest pain noted
patient is on 1:1 for safety, anxiety and restlessness, no acute distress noted, no s/s chest pain noted
Vital signs are as follow  /106  HR 92  Temp 97.6  SpO2 97%
Vital signs are as follows  /103    Temp 97.7  SpO2 96%

## 2023-02-21 NOTE — PROGRESS NOTE ADULT - PROBLEM SELECTOR PROBLEM 10
HLD (hyperlipidemia)
GERD (gastroesophageal reflux disease)

## 2023-02-21 NOTE — PROVIDER CONTACT NOTE (OTHER) - ACTION/TREATMENT ORDERED:
Rito Ortega made aware. No new recommendations recommended at this time. Will continue to monitor. Safety maintained.

## 2023-02-21 NOTE — PROGRESS NOTE ADULT - ASSESSMENT
67-year-old male with past medical history of hypertension, BPH, glaucoma, spondylolisthesis, 30-pack-year smoking history, and MAC pneumonia diagnosed last year but lost to follow up, presenting with hypoxic respiratory failure and acute encephalopathy. 

## 2023-02-21 NOTE — DISCHARGE NOTE NURSING/CASE MANAGEMENT/SOCIAL WORK - NSDCDMENAME_GEN_ALL_CORE_FT
[employer] to deliver potable 02 to bedside today and home concentrator to home today [employer] delivered portable Poc 02 to bedside today and home concentrator to be delivered to home today after 8pm- Dtr made aware company will call to make home delivery today- Dtr made aware how to use portable 02 at bedside

## 2023-02-22 ENCOUNTER — NON-APPOINTMENT (OUTPATIENT)
Age: 68
End: 2023-02-22

## 2023-03-06 ENCOUNTER — APPOINTMENT (OUTPATIENT)
Dept: INTERNAL MEDICINE | Facility: CLINIC | Age: 68
End: 2023-03-06
Payer: MEDICAID

## 2023-03-06 VITALS
HEART RATE: 76 BPM | OXYGEN SATURATION: 90 % | TEMPERATURE: 97.8 F | SYSTOLIC BLOOD PRESSURE: 132 MMHG | BODY MASS INDEX: 19.46 KG/M2 | WEIGHT: 114 LBS | DIASTOLIC BLOOD PRESSURE: 72 MMHG | HEIGHT: 64 IN

## 2023-03-06 DIAGNOSIS — A31.0 PULMONARY MYCOBACTERIAL INFECTION: ICD-10-CM

## 2023-03-06 PROCEDURE — 99495 TRANSJ CARE MGMT MOD F2F 14D: CPT

## 2023-03-06 RX ORDER — MELOXICAM 7.5 MG/1
7.5 TABLET ORAL DAILY
Qty: 20 | Refills: 3 | Status: ACTIVE | COMMUNITY
Start: 2023-03-06 | End: 1900-01-01

## 2023-03-06 RX ORDER — CYCLOBENZAPRINE HYDROCHLORIDE 5 MG/1
5 TABLET, FILM COATED ORAL
Qty: 60 | Refills: 1 | Status: COMPLETED | COMMUNITY
Start: 2022-03-10 | End: 2023-03-06

## 2023-03-06 NOTE — HISTORY OF PRESENT ILLNESS
[Post-hospitalization from ___ Hospital] : Post-hospitalization from [unfilled] Hospital [Admitted on: ___] : The patient was admitted on [unfilled] [Discharged on ___] : discharged on [unfilled] [FreeTextEntry2] : 68 yo M with hx severe emphysema, MAC was admitted to [facility] for hypoxia, O2 86% on RA. Pt found to have severe emphysema and pulm htn. He was given budesonide, prednisone, spiriva and ventolin. Course was complicated by psychosis-- pt had not slept in 2 days. Per daughter, nothing was given until she complained. Haldol was not helping, ended up getting zyprexa which helped. Since discharge, he has been sleeping better, still not great, but much better. Breathing also much better. Gets SOB easily with exertion. Needs new pulm. \par \par Needs med refills.

## 2023-03-06 NOTE — PHYSICAL EXAM
[No Acute Distress] : no acute distress [Well-Appearing] : well-appearing [Cachectic] : cachexia was observed [Normal] : affect was normal and insight and judgment were intact [de-identified] : On O2 nasal cannula

## 2023-03-30 ENCOUNTER — APPOINTMENT (OUTPATIENT)
Dept: UROLOGY | Facility: CLINIC | Age: 68
End: 2023-03-30

## 2023-04-05 LAB
CULTURE RESULTS: SIGNIFICANT CHANGE UP
SPECIMEN SOURCE: SIGNIFICANT CHANGE UP

## 2023-04-12 ENCOUNTER — NON-APPOINTMENT (OUTPATIENT)
Age: 68
End: 2023-04-12

## 2023-04-12 ENCOUNTER — APPOINTMENT (OUTPATIENT)
Dept: CARDIOLOGY | Facility: CLINIC | Age: 68
End: 2023-04-12
Payer: MEDICAID

## 2023-04-12 VITALS
OXYGEN SATURATION: 93 % | DIASTOLIC BLOOD PRESSURE: 80 MMHG | BODY MASS INDEX: 19.29 KG/M2 | WEIGHT: 113 LBS | SYSTOLIC BLOOD PRESSURE: 118 MMHG | HEART RATE: 72 BPM | HEIGHT: 64 IN

## 2023-04-12 DIAGNOSIS — Z82.49 FAMILY HISTORY OF ISCHEMIC HEART DISEASE AND OTHER DISEASES OF THE CIRCULATORY SYSTEM: ICD-10-CM

## 2023-04-12 DIAGNOSIS — Z83.3 FAMILY HISTORY OF DIABETES MELLITUS: ICD-10-CM

## 2023-04-12 DIAGNOSIS — Z83.42 FAMILY HISTORY OF FAMILIAL HYPERCHOLESTEROLEMIA: ICD-10-CM

## 2023-04-12 PROCEDURE — 93000 ELECTROCARDIOGRAM COMPLETE: CPT

## 2023-04-12 PROCEDURE — 99204 OFFICE O/P NEW MOD 45 MIN: CPT

## 2023-04-12 NOTE — PHYSICAL EXAM
[Frail] : frail [Cachectic] : cachexia was observed [Normal Conjunctiva] : normal conjunctiva [No Carotid Bruit] : no carotid bruit [Normal S1, S2] : normal S1, S2 [No Rub] : no rub [Clear Lung Fields] : clear lung fields [Soft] : abdomen soft [Non Tender] : non-tender [Normal Bowel Sounds] : normal bowel sounds [No Edema] : no edema [No Cyanosis] : no cyanosis [No Rash] : no rash [Normal] : moves all extremities, no focal deficits, normal speech [Alert and Oriented] : alert and oriented [Normal memory] : normal memory [de-identified] : walks with cane.

## 2023-04-12 NOTE — DISCUSSION/SUMMARY
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 Further plan based on Echo results. Hypertension, controlled. Continue Losartan. Hypercholesterolemia, continue Atorvastatin. COPD, on home Oxygen, follows up with Pulmonary. Explained Mr. Ding and Daughter in detail.

## 2023-04-12 NOTE — HISTORY OF PRESENT ILLNESS
[FreeTextEntry1] :  Md Timur R is a 67 year old Indonesian speaking male ( translated by daughter Ms. Mercedes Stephania) with history of Hypertension, Hypercholesterolemia and COPD comes for cardiac evaluation. Admitted to [facility] in 2/ 2023 with acute respiratory failure secondary to hypoxia and was treated. Since discharge he is on 4 L Oxygen 24 hours a day. After discharge from hospital quit smoking. 40 year history of cigarette smoking. Complaining of shortness of breath. Complaining of chest tightness/pressure on walking, 5/10 in intensity, non radiating and relieved with rest in few minutes of 6 months duration. No palpitations. Compliant to medications.

## 2023-04-12 NOTE — REVIEW OF SYSTEMS
[Feeling Fatigued] : feeling fatigued [Dyspnea on exertion] : dyspnea during exertion [Chest Discomfort] : chest discomfort [Joint Pain] : joint pain [Negative] : Respiratory [Fever] : no fever [Headache] : no headache [Chills] : no chills [Blurry Vision] : no blurred vision [Lower Ext Edema] : no extremity edema [Palpitations] : no palpitations [Orthopnea] : no orthopnea [PND] : no PND [Abdominal Pain] : no abdominal pain [Nausea] : no nausea [Vomiting] : no vomiting [Rash] : no rash [Itching] : no itching [Dizziness] : no dizziness [Tremor] : no tremor was seen [Numbness (Hypoesthesia)] : no numbness [Tingling (Paresthesia)] : no tingling [Confusion] : no confusion was observed [Anxiety] : no anxiety [Under Stress] : not under stress [Easy Bleeding] : no tendency for easy bleeding [Easy Bruising] : no tendency for easy bruising

## 2023-04-17 ENCOUNTER — APPOINTMENT (OUTPATIENT)
Dept: CARDIOLOGY | Facility: CLINIC | Age: 68
End: 2023-04-17
Payer: MEDICAID

## 2023-04-17 DIAGNOSIS — R06.02 SHORTNESS OF BREATH: ICD-10-CM

## 2023-04-17 DIAGNOSIS — R07.89 OTHER CHEST PAIN: ICD-10-CM

## 2023-04-17 PROCEDURE — 93306 TTE W/DOPPLER COMPLETE: CPT

## 2023-04-26 ENCOUNTER — APPOINTMENT (OUTPATIENT)
Dept: CARDIOLOGY | Facility: CLINIC | Age: 68
End: 2023-04-26

## 2023-05-09 ENCOUNTER — OUTPATIENT (OUTPATIENT)
Dept: OUTPATIENT SERVICES | Facility: HOSPITAL | Age: 68
LOS: 1 days | End: 2023-05-09

## 2023-05-09 ENCOUNTER — APPOINTMENT (OUTPATIENT)
Dept: CV DIAGNOSTICS | Facility: HOSPITAL | Age: 68
End: 2023-05-09
Payer: MEDICAID

## 2023-05-09 DIAGNOSIS — R06.02 SHORTNESS OF BREATH: ICD-10-CM

## 2023-05-09 DIAGNOSIS — Z98.890 OTHER SPECIFIED POSTPROCEDURAL STATES: Chronic | ICD-10-CM

## 2023-05-09 DIAGNOSIS — R07.89 OTHER CHEST PAIN: ICD-10-CM

## 2023-05-09 DIAGNOSIS — Z98.41 CATARACT EXTRACTION STATUS, RIGHT EYE: Chronic | ICD-10-CM

## 2023-05-09 PROCEDURE — 93018 CV STRESS TEST I&R ONLY: CPT | Mod: GC

## 2023-05-09 PROCEDURE — 78452 HT MUSCLE IMAGE SPECT MULT: CPT | Mod: 26,MH

## 2023-05-09 PROCEDURE — 93016 CV STRESS TEST SUPVJ ONLY: CPT | Mod: GC

## 2023-07-07 PROBLEM — I10 HYPERTENSION, ESSENTIAL: Status: ACTIVE | Noted: 2017-08-26

## 2023-07-07 PROBLEM — R73.03 PRE-DIABETES: Status: ACTIVE | Noted: 2019-05-02

## 2023-07-07 PROBLEM — E78.00 HYPERCHOLESTEROLEMIA: Status: ACTIVE | Noted: 2023-04-12

## 2023-07-07 PROBLEM — R53.1 WEAKNESS: Status: ACTIVE | Noted: 2023-01-01

## 2023-07-07 NOTE — HISTORY OF PRESENT ILLNESS
[Family Member] : family member [FreeTextEntry1] : f/u, TREVON form completion [de-identified] : 66 yo M with severe COPD, MAC, severe lumbar stenosis presents for f/u. Daughter reports has been very fatigued lately. Gets tired/SOB after minimal activity. Currently on 4L O2. Even with supplemental O2, levels go down to 70s with exertion, even minimal exertion. Weakness/fatigue-- some days better than others. Denes issues sleeping but nasal cannula does come off in middle of night at times and pt wakes up due to SOB and needs to readjust.\par \par Prev pulm moved out of [facility], pt prefers to stay within the system. Needs new referral.\par \par Had recent stress test with cardio-- no ischemia. \par

## 2023-07-11 PROBLEM — E53.8 B12 DEFICIENCY: Status: ACTIVE | Noted: 2023-01-01

## 2023-09-08 PROBLEM — K59.09 CHRONIC CONSTIPATION: Status: ACTIVE | Noted: 2017-05-26

## 2023-09-08 NOTE — REVIEW OF SYSTEMS
[Fatigue] : fatigue [Recent Change In Weight] : ~T no recent weight change [Cough] : no cough [Dyspnea on Exertion] : dyspnea on exertion [Negative] : Heme/Lymph

## 2023-09-08 NOTE — PLAN
[FreeTextEntry1] : Pt when severe chronic illness-- will try to get home care services for patient-- VS check, PT for muscular weakness.

## 2023-09-08 NOTE — HISTORY OF PRESENT ILLNESS
[Family Member] : family member [FreeTextEntry1] : follow up [de-identified] : 69 yo M with severe COPD presents for f/u. Will feeling very fatigued and weak. He is on 4L of O2 currently. He has not been able to find a pulm to follow up with. [facility] They were able to find one through insurance but very far. Getting out of house is difficult. He also has severe lumbar stenosis, making getting around difficult as well.   They currently have no home care services. He was started on O2 during his last hospital stay in Feb.

## 2023-09-08 NOTE — PHYSICAL EXAM
[Cachectic] : cachexia was observed [Chronically Ill] : chronically ill [Appears Older] : appears older than stated age [No Accessory Muscle Use] : no accessory muscle use [Clear to Auscultation] : lungs were clear to auscultation bilaterally [Normal] : normal rate, regular rhythm, normal S1 and S2 and no murmur heard [de-identified] : 4L O2 [de-identified] : unsteady gait

## 2023-09-11 PROBLEM — R29.898 WEAKNESS OF BOTH LOWER EXTREMITIES: Status: ACTIVE | Noted: 2019-02-22

## 2023-12-12 NOTE — PROGRESS NOTE ADULT - PROBLEM SELECTOR PLAN 11
Patient : Ilir Blanchard Age: 69 year old Sex: male   MRN: [id number] Encounter Date: 12/12/2023    History     Chief Complaint   Patient presents with    Weakness       HPI    Ilir Blanchard is a 69 year old presenting to the emergency department complaining of low blood pressures.  The patient was diagnosed with a UTI 3 days ago by his primary care doctor and started on Bactrim.  The patient is on peritoneal dialysis at home.  This morning the patient was on his way to the nephrology clinic for labs and workup.  He was feeling nauseated in the car transport on the way to the appointment.  At the clinic the patient had a blood pressure of 80s over 50s.  Patient was sent over here for evaluation.  He denies any pain right now.  Patient denies any nausea.  There are no alleviating factors.  He has not had cough or cold or fevers or chills.  He has not had any skin changes.  Family member has not heard from the primary care physician regarding the patient's urinary culture    I have reviewed Ilir Blanchard's previous  urine culture report from 6 days ago .  Note Review Summary:  Patient's urine specimen shows greater than 100,000 colonies of MRSA which is susceptible to Bactrim.  It is also susceptible to nitrofurantoin rifampin and vancomycin.    Allergies   Allergen Reactions    Beef Allergy   (Food Or Med) Other (See Comments)     No Beef per pt request        Current Facility-Administered Medications   Medication    sodium chloride 0.9 % flush bag 25 mL    sodium chloride 0.9 % injection 2 mL       Past Medical History:   Diagnosis Date    BPH (benign prostatic hyperplasia)     Cataracts, bilateral     Cerebral infarction (CMD) 2022    x3; left sided weakness    Chronic kidney disease     Dialysis    Diabetes mellitus (CMD)     Essential (primary) hypertension     High cholesterol     Lumbar radiculopathy        Past Surgical History:   Procedure Laterality Date    Cataract extraction w/  intraocular lens implant  Right 08/26/2020    Eye surgery      Lap place peritoneal cath permanent  05/18/2023    Dr Sue    Lipoma resection      back    Peritoneal catheter insertion  05/18/2023    Removal gallbladder      Wound debridement  09/25/2023    gluteal wound debridement    Wound debridement  09/30/2023    gluteal/sacral wound debridement       Family History   Problem Relation Age of Onset    Diabetes Mother     Diabetes Father        Social History     Tobacco Use    Smoking status: Never    Smokeless tobacco: Never   Vaping Use    Vaping Use: never used   Substance Use Topics    Alcohol use: Not Currently    Drug use: Never       Review of Systems     Review of Systems   Constitutional:  Negative for chills and fever.   HENT:  Negative for sore throat.    Eyes:  Negative for discharge.   Respiratory:  Negative for shortness of breath.    Cardiovascular:  Negative for chest pain.   Gastrointestinal:  Positive for nausea. Negative for abdominal pain.   Genitourinary:  Negative for dysuria.   Musculoskeletal:  Negative for joint swelling.   Skin:  Negative for rash.   Neurological:  Negative for seizures.   Hematological:  Does not bruise/bleed easily.   Psychiatric/Behavioral:  Negative for confusion.        Physical Exam     ED Triage Vitals   ED Triage Vitals Group      Temp 12/12/23 1148 97.2 °F (36.2 °C)      Heart Rate 12/12/23 1148 69      Resp 12/12/23 1148 16      BP 12/12/23 1148 129/66      SpO2 12/12/23 1148 100 %      EtCO2 mmHg --       Height --       Weight 12/12/23 1222 140 lb 3.4 oz (63.6 kg)      Weight Scale Used 12/12/23 1222 Scale in bed      BMI (Calculated) --       IBW/kg (Calculated) --        Physical Exam  Vitals and nursing note reviewed.   Constitutional:       General: He is not in acute distress.     Appearance: He is well-developed.      Comments: Mildly cachectic   HENT:      Head: Normocephalic and atraumatic.      Right Ear: External ear normal.      Left Ear: External ear normal.   Eyes:       Pupils: Pupils are equal, round, and reactive to light.   Cardiovascular:      Rate and Rhythm: Normal rate and regular rhythm.      Heart sounds: Normal heart sounds.   Pulmonary:      Effort: Pulmonary effort is normal.      Breath sounds: Normal breath sounds.   Abdominal:      General: Bowel sounds are normal.      Palpations: Abdomen is soft.      Tenderness: There is no abdominal tenderness. There is no guarding or rebound.   Musculoskeletal:         General: Normal range of motion.      Cervical back: Normal range of motion and neck supple.   Skin:     General: Skin is warm and dry.   Neurological:      Mental Status: He is alert.           Procedures     Procedures    Lab Results     Results for orders placed or performed during the hospital encounter of 12/12/23   Comprehensive Metabolic Panel   Result Value Ref Range    Fasting Status      Sodium 122 (L) 135 - 145 mmol/L    Potassium 4.7 3.4 - 5.1 mmol/L    Chloride 90 (L) 97 - 110 mmol/L    Carbon Dioxide 19 (L) 21 - 32 mmol/L    Anion Gap 18 7 - 19 mmol/L    Glucose 196 (H) 70 - 99 mg/dL    BUN 67 (H) 6 - 20 mg/dL    Creatinine 9.49 (H) 0.67 - 1.17 mg/dL    Glomerular Filtration Rate 5 (L) >=60    BUN/Cr 7 7 - 25    Calcium 9.1 8.4 - 10.2 mg/dL    Bilirubin, Total 0.2 0.2 - 1.0 mg/dL    GOT/AST 27 <=37 Units/L    GPT/ALT 53 <64 Units/L    Alkaline Phosphatase 102 45 - 117 Units/L    Albumin 2.5 (L) 3.6 - 5.1 g/dL    Protein, Total 7.6 6.4 - 8.2 g/dL    Globulin 5.1 (H) 2.0 - 4.0 g/dL    A/G Ratio 0.5 (L) 1.0 - 2.4   TROPONIN I, HIGH SENSITIVITY   Result Value Ref Range    Troponin I, High Sensitivity 18 <77 ng/L   NT proBNP   Result Value Ref Range    NT-proBNP 2,408 (H) <=125 pg/mL   CBC with Automated Differential (performable only)   Result Value Ref Range    WBC 7.8 4.2 - 11.0 K/mcL    RBC 3.80 (L) 4.50 - 5.90 mil/mcL    HGB 10.6 (L) 13.0 - 17.0 g/dL    HCT 30.9 (L) 39.0 - 51.0 %    MCV 81.3 78.0 - 100.0 fl    MCH 27.9 26.0 - 34.0 pg    MCHC 34.3  32.0 - 36.5 g/dL    RDW-CV 16.3 (H) 11.0 - 15.0 %    RDW-SD 49.0 39.0 - 50.0 fL     140 - 450 K/mcL    NRBC 0 <=0 /100 WBC    Neutrophil, Percent 70 %    Lymphocytes, Percent 19 %    Mono, Percent 6 %    Eosinophils, Percent 5 %    Basophils, Percent 0 %    Immature Granulocytes 0 %    Absolute Neutrophils 5.4 1.8 - 7.7 K/mcL    Absolute Lymphocytes 1.5 1.0 - 4.0 K/mcL    Absolute Monocytes 0.4 0.3 - 0.9 K/mcL    Absolute Eosinophils  0.4 0.0 - 0.5 K/mcL    Absolute Basophils 0.0 0.0 - 0.3 K/mcL    Absolute Immature Granulocytes 0.0 0.0 - 0.2 K/mcL   ISTAT8 VENOUS  POINT OF CARE   Result Value Ref Range    BUN - POINT OF CARE 61 (H) 6 - 20 mg/dL    SODIUM - POINT OF CARE 122 (L) 135 - 145 mmol/L    POTASSIUM - POINT OF CARE 4.9 3.4 - 5.1 mmol/L    CHLORIDE - POINT OF CARE 93 (L) 97 - 110 mmol/L    TCO2 - POINT OF CARE 17 (L) 19 - 24 mmol/L    ANION GAP - POINT OF CARE 18 7 - 19 mmol/L    HEMATOCRIT - POINT OF CARE 35.0 (L) 39.0 - 51.0 %    HEMOGLOBIN - POINT OF CARE 11.9 (L) 13.0 - 17.0 g/dL    GLUCOSE - POINT OF CARE 186 (H) 70 - 99 mg/dL    CALCIUM, IONIZED - POINT OF CARE 1.13 (L) 1.15 - 1.29 mmol/L    Creatinine 10.30 (H) 0.67 - 1.17 mg/dL    Glomerular Filtration Rate 5 (L) >=60   LACTIC ACID VENOUS WITH REFLEX - POINT OF CARE   Result Value Ref Range    LACTIC ACID, VENOUS - POINT OF CARE 0.8 <2.0 mmol/L   TROPONIN I  POINT OF CARE   Result Value Ref Range    TROPONIN I - POINT OF CARE <0.10 <0.10 ng/mL       EKG     [facility]use EKG report   Results for orders placed or performed during the hospital encounter of 12/12/23   Electrocardiogram 12-Lead   Result Value Ref Range    Systolic Blood Pressure 129     Diastolic Blood Pressure 66     Ventricular Rate EKG/Min (BPM) 67     Atrial Rate (BPM) 67     ND-Interval (MSEC) 166     QRS-Interval (MSEC) 142     QT-Interval (MSEC) 470     QTc 496     P Axis (Degrees) 72     R Axis (Degrees) 57     T Axis (Degrees) 56     REPORT TEXT       Normal sinus rhythm  Right  bundle branch block  Abnormal ECG  When compared with ECG of  12-OCT-2023 05:02,  No significant change was found  Confirmed by TAMIA BEE MD ([id number]),  Chandrika Villaseñor ([id number]) on 12/12/2023 12:55:31 PM         Radiology Results     Imaging Results              XR CHEST PA OR AP 1 VIEW (Final result)  Result time 12/12/23 14:42:20      Final result                   Impression:    IMPRESSION:    No acute abnormality.      Electronically Signed by: Rosio Lee MD  Signed on: 12/12/2023 2:42 PM  Created on Workstation ID: [id number]  Signed on Workstation ID: [id number]               Narrative:    EXAM: XR CHEST AP OR PA    INDICATION: weakness, assess for pneumonia / chf, Hypo-osmolality and  hyponatremia, Hypotension, unspecified.    COMPARISON: 10/5/2023.    FINDINGS:    The right IJ dialysis catheter with tip near the cavoatrial junction.    The heart is normal in size. The pulmonary vasculature is within normal  limits.     The lungs are clear. No pleural effusions are seen. There is no evidence  for pneumothorax.                                      ED Medications     Medications   sodium chloride 0.9 % flush bag 25 mL (has no administration in time range)   sodium chloride 0.9 % injection 2 mL (2 mLs Intracatheter Given 12/12/23 1243)   sodium chloride (NORMAL SALINE) 0.9 % bolus 500 mL (0 mLs Intravenous Completed 12/12/23 1243)   ondansetron (ZOFRAN) injection 4 mg (4 mg Intravenous Given 12/12/23 1214)         ED Course     Vitals:    12/12/23 1300 12/12/23 1330 12/12/23 1400 12/12/23 1500   BP: (!) 155/79 (!) 158/74 (!) 152/73 (!) 153/72   BP Location:       Patient Position:       Pulse: 70 70 72 73   Resp: 18 16 19 18   Temp:       TempSrc:       SpO2: 100% 100% 100% 100%   Weight:           ED Course as of 12/12/23 1553   Tue Dec 12, 2023   1204 69-year-old male with an episode of hypotension prior to arrival.  The patient may have had a vasovagal episode in response to nausea.  Patient has had  a recent UTI but is on antibiotics that should eradicate his MRSA which was noted on urine culture.  Differential diagnosis includes infection, electrolyte abnormality, cardiac abnormality, dehydration, anemia among others.  Appropriate workup ordered.   [PH]   1338 Patient's workup has returned.  His laboratories are showing a sodium of 122 which could make him symptomatic with weakness.  I also suspect that there was a vasovagal component of his symptoms due to the nausea he was having in the transport vehicle on the way to the clinic.  I do think the patient should be admitted to the hospital to correct his hyponatremia. [PH]   1341  I have discussed the case with FELIPE Lucas for Dr. Ryan, internal med.   We discussed the pertinent history, physical, diagnostic studies and the ED management of the patient.  The plan is admission to Dr. Ryan  [PH]      ED Course User Index  [PH] Earnest Bill MD       Radiology Review: I have independently interpreted the Chest X-Ray and have found No acute or active disease.  I am awaiting on the final radiology read.          Consults                ED Consults done in the ED course    Medical Decision Making                          MDM done in ED Course        Does the Patient have sepsis: NO     Critical Care       No Critical Care        Disposition       Clinical Impression and Diagnosis  3:53 PM 12/12/23     Diagnosis:   1. Hyponatremia    2. Hypotensive episode           Pt to be admitted to Dr. Ryan     Condition on Admission: Serious    12/12/23          Admit 12/12/2023  1:41 PM  Telemetry Bed?: Yes  Admitting Physician: ANABEL RYAN [[id number]]  Observation Unit Appropriate?: No  Transferring Patient to? Only adjust for transfers between [facility] ([facility], [facility], [facility]). **PLEASE ONLY USE BUTTON OPTIONS**: [facility] [[id number]]  Is this a telephone or verbal order?: This is a telephone order from the admitting physician             Earnest Bill,  MD  12/12/23 1558     DVTppx: lovenox DVTppx: lovenox  Dispo: PT consult   Code: FULL CODE

## 2024-01-01 ENCOUNTER — TRANSCRIPTION ENCOUNTER (OUTPATIENT)
Age: 69
End: 2024-01-01

## 2024-01-01 ENCOUNTER — INPATIENT (INPATIENT)
Facility: HOSPITAL | Age: 69
LOS: 1 days | Discharge: HOSPICE | End: 2024-05-17
Attending: HOSPITALIST | Admitting: HOSPITALIST
Payer: MEDICAID

## 2024-01-01 ENCOUNTER — APPOINTMENT (OUTPATIENT)
Dept: INTERNAL MEDICINE | Facility: CLINIC | Age: 69
End: 2024-01-01
Payer: MEDICAID

## 2024-01-01 ENCOUNTER — APPOINTMENT (OUTPATIENT)
Dept: PULMONOLOGY | Facility: CLINIC | Age: 69
End: 2024-01-01
Payer: MEDICAID

## 2024-01-01 ENCOUNTER — RESULT REVIEW (OUTPATIENT)
Age: 69
End: 2024-01-01

## 2024-01-01 ENCOUNTER — RX RENEWAL (OUTPATIENT)
Age: 69
End: 2024-01-01

## 2024-01-01 ENCOUNTER — EMERGENCY (EMERGENCY)
Facility: HOSPITAL | Age: 69
LOS: 1 days | Discharge: HOME | End: 2024-01-01
Attending: STUDENT IN AN ORGANIZED HEALTH CARE EDUCATION/TRAINING PROGRAM | Admitting: STUDENT IN AN ORGANIZED HEALTH CARE EDUCATION/TRAINING PROGRAM
Payer: MEDICAID

## 2024-01-01 VITALS
DIASTOLIC BLOOD PRESSURE: 88 MMHG | RESPIRATION RATE: 18 BRPM | SYSTOLIC BLOOD PRESSURE: 167 MMHG | TEMPERATURE: 98 F | HEART RATE: 69 BPM | OXYGEN SATURATION: 99 %

## 2024-01-01 VITALS
WEIGHT: 115 LBS | SYSTOLIC BLOOD PRESSURE: 110 MMHG | RESPIRATION RATE: 17 BRPM | HEART RATE: 96 BPM | BODY MASS INDEX: 19.63 KG/M2 | DIASTOLIC BLOOD PRESSURE: 68 MMHG | HEIGHT: 64 IN | OXYGEN SATURATION: 80 % | TEMPERATURE: 98 F

## 2024-01-01 VITALS
HEART RATE: 72 BPM | SYSTOLIC BLOOD PRESSURE: 180 MMHG | RESPIRATION RATE: 20 BRPM | OXYGEN SATURATION: 92 % | DIASTOLIC BLOOD PRESSURE: 93 MMHG

## 2024-01-01 VITALS
SYSTOLIC BLOOD PRESSURE: 138 MMHG | HEART RATE: 63 BPM | OXYGEN SATURATION: 95 % | DIASTOLIC BLOOD PRESSURE: 76 MMHG | TEMPERATURE: 98 F | RESPIRATION RATE: 18 BRPM

## 2024-01-01 VITALS
HEART RATE: 80 BPM | TEMPERATURE: 98 F | DIASTOLIC BLOOD PRESSURE: 80 MMHG | SYSTOLIC BLOOD PRESSURE: 132 MMHG | OXYGEN SATURATION: 94 % | RESPIRATION RATE: 18 BRPM

## 2024-01-01 DIAGNOSIS — Z98.41 CATARACT EXTRACTION STATUS, RIGHT EYE: Chronic | ICD-10-CM

## 2024-01-01 DIAGNOSIS — Z29.9 ENCOUNTER FOR PROPHYLACTIC MEASURES, UNSPECIFIED: ICD-10-CM

## 2024-01-01 DIAGNOSIS — H40.9 UNSPECIFIED GLAUCOMA: ICD-10-CM

## 2024-01-01 DIAGNOSIS — Z98.890 OTHER SPECIFIED POSTPROCEDURAL STATES: Chronic | ICD-10-CM

## 2024-01-01 DIAGNOSIS — J44.9 CHRONIC OBSTRUCTIVE PULMONARY DISEASE, UNSPECIFIED: ICD-10-CM

## 2024-01-01 DIAGNOSIS — J96.11 CHRONIC RESPIRATORY FAILURE WITH HYPOXIA: ICD-10-CM

## 2024-01-01 DIAGNOSIS — I10 ESSENTIAL (PRIMARY) HYPERTENSION: ICD-10-CM

## 2024-01-01 DIAGNOSIS — E78.5 HYPERLIPIDEMIA, UNSPECIFIED: ICD-10-CM

## 2024-01-01 DIAGNOSIS — Z51.5 ENCOUNTER FOR PALLIATIVE CARE: ICD-10-CM

## 2024-01-01 DIAGNOSIS — N40.0 BENIGN PROSTATIC HYPERPLASIA WITHOUT LOWER URINARY TRACT SYMPTOMS: ICD-10-CM

## 2024-01-01 DIAGNOSIS — K21.9 GASTRO-ESOPHAGEAL REFLUX DISEASE WITHOUT ESOPHAGITIS: ICD-10-CM

## 2024-01-01 DIAGNOSIS — J81.0 ACUTE PULMONARY EDEMA: ICD-10-CM

## 2024-01-01 DIAGNOSIS — Z71.89 OTHER SPECIFIED COUNSELING: ICD-10-CM

## 2024-01-01 DIAGNOSIS — R73.09 OTHER ABNORMAL GLUCOSE: ICD-10-CM

## 2024-01-01 DIAGNOSIS — G47.00 INSOMNIA, UNSPECIFIED: ICD-10-CM

## 2024-01-01 DIAGNOSIS — R53.81 OTHER MALAISE: ICD-10-CM

## 2024-01-01 DIAGNOSIS — K59.00 CONSTIPATION, UNSPECIFIED: ICD-10-CM

## 2024-01-01 LAB
A1C WITH ESTIMATED AVERAGE GLUCOSE RESULT: 7.1 % — HIGH (ref 4–5.6)
ADD ON TEST-SPECIMEN IN LAB: SIGNIFICANT CHANGE UP
ALBUMIN SERPL ELPH-MCNC: 3.9 G/DL — SIGNIFICANT CHANGE UP (ref 3.3–5)
ALBUMIN SERPL ELPH-MCNC: 4 G/DL — SIGNIFICANT CHANGE UP (ref 3.3–5)
ALBUMIN SERPL ELPH-MCNC: 4.3 G/DL — SIGNIFICANT CHANGE UP (ref 3.3–5)
ALP SERPL-CCNC: 107 U/L — SIGNIFICANT CHANGE UP (ref 40–120)
ALP SERPL-CCNC: 87 U/L — SIGNIFICANT CHANGE UP (ref 40–120)
ALP SERPL-CCNC: 91 U/L — SIGNIFICANT CHANGE UP (ref 40–120)
ALT FLD-CCNC: 14 U/L — SIGNIFICANT CHANGE UP (ref 4–41)
ALT FLD-CCNC: 15 U/L — SIGNIFICANT CHANGE UP (ref 4–41)
ALT FLD-CCNC: 17 U/L — SIGNIFICANT CHANGE UP (ref 4–41)
ANION GAP SERPL CALC-SCNC: 11 MMOL/L — SIGNIFICANT CHANGE UP (ref 7–14)
ANION GAP SERPL CALC-SCNC: 11 MMOL/L — SIGNIFICANT CHANGE UP (ref 7–14)
ANION GAP SERPL CALC-SCNC: 14 MMOL/L — SIGNIFICANT CHANGE UP (ref 7–14)
APTT BLD: 32.3 SEC — SIGNIFICANT CHANGE UP (ref 24.5–35.6)
AST SERPL-CCNC: 19 U/L — SIGNIFICANT CHANGE UP (ref 4–40)
AST SERPL-CCNC: 19 U/L — SIGNIFICANT CHANGE UP (ref 4–40)
AST SERPL-CCNC: 39 U/L — SIGNIFICANT CHANGE UP (ref 4–40)
B PERT DNA SPEC QL NAA+PROBE: SIGNIFICANT CHANGE UP
B PERT+PARAPERT DNA PNL SPEC NAA+PROBE: SIGNIFICANT CHANGE UP
BASE EXCESS BLDV CALC-SCNC: 0.2 MMOL/L — SIGNIFICANT CHANGE UP (ref -2–3)
BASE EXCESS BLDV CALC-SCNC: 0.3 MMOL/L — SIGNIFICANT CHANGE UP (ref -2–3)
BASOPHILS # BLD AUTO: 0.03 K/UL — SIGNIFICANT CHANGE UP (ref 0–0.2)
BASOPHILS # BLD AUTO: 0.05 K/UL — SIGNIFICANT CHANGE UP (ref 0–0.2)
BASOPHILS # BLD AUTO: 0.05 K/UL — SIGNIFICANT CHANGE UP (ref 0–0.2)
BASOPHILS NFR BLD AUTO: 0.4 % — SIGNIFICANT CHANGE UP (ref 0–2)
BASOPHILS NFR BLD AUTO: 0.6 % — SIGNIFICANT CHANGE UP (ref 0–2)
BASOPHILS NFR BLD AUTO: 0.7 % — SIGNIFICANT CHANGE UP (ref 0–2)
BILIRUB SERPL-MCNC: 0.4 MG/DL — SIGNIFICANT CHANGE UP (ref 0.2–1.2)
BILIRUB SERPL-MCNC: 0.5 MG/DL — SIGNIFICANT CHANGE UP (ref 0.2–1.2)
BILIRUB SERPL-MCNC: 0.6 MG/DL — SIGNIFICANT CHANGE UP (ref 0.2–1.2)
BLOOD GAS VENOUS COMPREHENSIVE RESULT: SIGNIFICANT CHANGE UP
BLOOD GAS VENOUS COMPREHENSIVE RESULT: SIGNIFICANT CHANGE UP
BORDETELLA PARAPERTUSSIS (RAPRVP): SIGNIFICANT CHANGE UP
BUN SERPL-MCNC: 11 MG/DL — SIGNIFICANT CHANGE UP (ref 7–23)
BUN SERPL-MCNC: 12 MG/DL — SIGNIFICANT CHANGE UP (ref 7–23)
BUN SERPL-MCNC: 9 MG/DL — SIGNIFICANT CHANGE UP (ref 7–23)
C PNEUM DNA SPEC QL NAA+PROBE: SIGNIFICANT CHANGE UP
CA-I SERPL-SCNC: 1.22 MMOL/L — SIGNIFICANT CHANGE UP (ref 1.15–1.33)
CALCIUM SERPL-MCNC: 8.4 MG/DL — SIGNIFICANT CHANGE UP (ref 8.4–10.5)
CALCIUM SERPL-MCNC: 8.6 MG/DL — SIGNIFICANT CHANGE UP (ref 8.4–10.5)
CALCIUM SERPL-MCNC: 9.1 MG/DL — SIGNIFICANT CHANGE UP (ref 8.4–10.5)
CHLORIDE BLDV-SCNC: 101 MMOL/L — SIGNIFICANT CHANGE UP (ref 96–108)
CHLORIDE BLDV-SCNC: 105 MMOL/L — SIGNIFICANT CHANGE UP (ref 96–108)
CHLORIDE SERPL-SCNC: 100 MMOL/L — SIGNIFICANT CHANGE UP (ref 98–107)
CHLORIDE SERPL-SCNC: 105 MMOL/L — SIGNIFICANT CHANGE UP (ref 98–107)
CHLORIDE SERPL-SCNC: 108 MMOL/L — HIGH (ref 98–107)
CHOLEST SERPL-MCNC: 169 MG/DL — SIGNIFICANT CHANGE UP
CO2 BLDV-SCNC: 28.5 MMOL/L — HIGH (ref 22–26)
CO2 BLDV-SCNC: 28.9 MMOL/L — HIGH (ref 22–26)
CO2 SERPL-SCNC: 21 MMOL/L — LOW (ref 22–31)
CO2 SERPL-SCNC: 22 MMOL/L — SIGNIFICANT CHANGE UP (ref 22–31)
CO2 SERPL-SCNC: 22 MMOL/L — SIGNIFICANT CHANGE UP (ref 22–31)
CREAT SERPL-MCNC: 0.81 MG/DL — SIGNIFICANT CHANGE UP (ref 0.5–1.3)
CREAT SERPL-MCNC: 0.88 MG/DL — SIGNIFICANT CHANGE UP (ref 0.5–1.3)
CREAT SERPL-MCNC: 0.97 MG/DL — SIGNIFICANT CHANGE UP (ref 0.5–1.3)
EGFR: 84 ML/MIN/1.73M2 — SIGNIFICANT CHANGE UP
EGFR: 93 ML/MIN/1.73M2 — SIGNIFICANT CHANGE UP
EGFR: 95 ML/MIN/1.73M2 — SIGNIFICANT CHANGE UP
EOSINOPHIL # BLD AUTO: 0.14 K/UL — SIGNIFICANT CHANGE UP (ref 0–0.5)
EOSINOPHIL # BLD AUTO: 0.15 K/UL — SIGNIFICANT CHANGE UP (ref 0–0.5)
EOSINOPHIL # BLD AUTO: 0.23 K/UL — SIGNIFICANT CHANGE UP (ref 0–0.5)
EOSINOPHIL NFR BLD AUTO: 1.7 % — SIGNIFICANT CHANGE UP (ref 0–6)
EOSINOPHIL NFR BLD AUTO: 2 % — SIGNIFICANT CHANGE UP (ref 0–6)
EOSINOPHIL NFR BLD AUTO: 2.7 % — SIGNIFICANT CHANGE UP (ref 0–6)
ESTIMATED AVERAGE GLUCOSE: 157 — SIGNIFICANT CHANGE UP
FLUAV AG NPH QL: SIGNIFICANT CHANGE UP
FLUAV SUBTYP SPEC NAA+PROBE: SIGNIFICANT CHANGE UP
FLUBV AG NPH QL: SIGNIFICANT CHANGE UP
FLUBV RNA SPEC QL NAA+PROBE: SIGNIFICANT CHANGE UP
GAS PNL BLDV: 136 MMOL/L — SIGNIFICANT CHANGE UP (ref 136–145)
GAS PNL BLDV: 140 MMOL/L — SIGNIFICANT CHANGE UP (ref 136–145)
GAS PNL BLDV: SIGNIFICANT CHANGE UP
GAS PNL BLDV: SIGNIFICANT CHANGE UP
GLUCOSE BLDV-MCNC: 111 MG/DL — HIGH (ref 70–99)
GLUCOSE BLDV-MCNC: 180 MG/DL — HIGH (ref 70–99)
GLUCOSE SERPL-MCNC: 114 MG/DL — HIGH (ref 70–99)
GLUCOSE SERPL-MCNC: 169 MG/DL — HIGH (ref 70–99)
GLUCOSE SERPL-MCNC: 202 MG/DL — HIGH (ref 70–99)
HADV DNA SPEC QL NAA+PROBE: SIGNIFICANT CHANGE UP
HCO3 BLDV-SCNC: 27 MMOL/L — SIGNIFICANT CHANGE UP (ref 22–29)
HCO3 BLDV-SCNC: 27 MMOL/L — SIGNIFICANT CHANGE UP (ref 22–29)
HCOV 229E RNA SPEC QL NAA+PROBE: SIGNIFICANT CHANGE UP
HCOV HKU1 RNA SPEC QL NAA+PROBE: SIGNIFICANT CHANGE UP
HCOV NL63 RNA SPEC QL NAA+PROBE: SIGNIFICANT CHANGE UP
HCOV OC43 RNA SPEC QL NAA+PROBE: SIGNIFICANT CHANGE UP
HCT VFR BLD CALC: 41.1 % — SIGNIFICANT CHANGE UP (ref 39–50)
HCT VFR BLD CALC: 42.7 % — SIGNIFICANT CHANGE UP (ref 39–50)
HCT VFR BLD CALC: 45.2 % — SIGNIFICANT CHANGE UP (ref 39–50)
HCT VFR BLDA CALC: 43 % — SIGNIFICANT CHANGE UP (ref 39–51)
HCT VFR BLDA CALC: 45 % — SIGNIFICANT CHANGE UP (ref 39–51)
HDLC SERPL-MCNC: 27 MG/DL — LOW
HGB BLD CALC-MCNC: 14.4 G/DL — SIGNIFICANT CHANGE UP (ref 12.6–17.4)
HGB BLD CALC-MCNC: 15.1 G/DL — SIGNIFICANT CHANGE UP (ref 12.6–17.4)
HGB BLD-MCNC: 14.1 G/DL — SIGNIFICANT CHANGE UP (ref 13–17)
HGB BLD-MCNC: 14.8 G/DL — SIGNIFICANT CHANGE UP (ref 13–17)
HGB BLD-MCNC: 15.5 G/DL — SIGNIFICANT CHANGE UP (ref 13–17)
HMPV RNA SPEC QL NAA+PROBE: SIGNIFICANT CHANGE UP
HPIV1 RNA SPEC QL NAA+PROBE: SIGNIFICANT CHANGE UP
HPIV2 RNA SPEC QL NAA+PROBE: SIGNIFICANT CHANGE UP
HPIV3 RNA SPEC QL NAA+PROBE: SIGNIFICANT CHANGE UP
HPIV4 RNA SPEC QL NAA+PROBE: SIGNIFICANT CHANGE UP
IANC: 4.23 K/UL — SIGNIFICANT CHANGE UP (ref 1.8–7.4)
IANC: 6.27 K/UL — SIGNIFICANT CHANGE UP (ref 1.8–7.4)
IANC: 6.39 K/UL — SIGNIFICANT CHANGE UP (ref 1.8–7.4)
IMM GRANULOCYTES NFR BLD AUTO: 0.4 % — SIGNIFICANT CHANGE UP (ref 0–0.9)
IMM GRANULOCYTES NFR BLD AUTO: 0.5 % — SIGNIFICANT CHANGE UP (ref 0–0.9)
IMM GRANULOCYTES NFR BLD AUTO: 0.5 % — SIGNIFICANT CHANGE UP (ref 0–0.9)
INR BLD: 1.1 RATIO — SIGNIFICANT CHANGE UP (ref 0.85–1.18)
LACTATE BLDV-MCNC: 1.1 MMOL/L — SIGNIFICANT CHANGE UP (ref 0.5–2)
LACTATE BLDV-MCNC: 1.9 MMOL/L — SIGNIFICANT CHANGE UP (ref 0.5–2)
LACTATE SERPL-SCNC: 2 MMOL/L — SIGNIFICANT CHANGE UP (ref 0.5–2)
LIPID PNL WITH DIRECT LDL SERPL: 105 MG/DL — HIGH
LYMPHOCYTES # BLD AUTO: 1.26 K/UL — SIGNIFICANT CHANGE UP (ref 1–3.3)
LYMPHOCYTES # BLD AUTO: 1.72 K/UL — SIGNIFICANT CHANGE UP (ref 1–3.3)
LYMPHOCYTES # BLD AUTO: 1.91 K/UL — SIGNIFICANT CHANGE UP (ref 1–3.3)
LYMPHOCYTES # BLD AUTO: 14.7 % — SIGNIFICANT CHANGE UP (ref 13–44)
LYMPHOCYTES # BLD AUTO: 19.4 % — SIGNIFICANT CHANGE UP (ref 13–44)
LYMPHOCYTES # BLD AUTO: 27.6 % — SIGNIFICANT CHANGE UP (ref 13–44)
M PNEUMO DNA SPEC QL NAA+PROBE: SIGNIFICANT CHANGE UP
MAGNESIUM SERPL-MCNC: 2.2 MG/DL — SIGNIFICANT CHANGE UP (ref 1.6–2.6)
MCHC RBC-ENTMCNC: 32.3 PG — SIGNIFICANT CHANGE UP (ref 27–34)
MCHC RBC-ENTMCNC: 32.5 PG — SIGNIFICANT CHANGE UP (ref 27–34)
MCHC RBC-ENTMCNC: 32.9 PG — SIGNIFICANT CHANGE UP (ref 27–34)
MCHC RBC-ENTMCNC: 34.3 GM/DL — SIGNIFICANT CHANGE UP (ref 32–36)
MCHC RBC-ENTMCNC: 34.3 GM/DL — SIGNIFICANT CHANGE UP (ref 32–36)
MCHC RBC-ENTMCNC: 34.7 GM/DL — SIGNIFICANT CHANGE UP (ref 32–36)
MCV RBC AUTO: 94.2 FL — SIGNIFICANT CHANGE UP (ref 80–100)
MCV RBC AUTO: 94.7 FL — SIGNIFICANT CHANGE UP (ref 80–100)
MCV RBC AUTO: 94.9 FL — SIGNIFICANT CHANGE UP (ref 80–100)
MONOCYTES # BLD AUTO: 0.57 K/UL — SIGNIFICANT CHANGE UP (ref 0–0.9)
MONOCYTES # BLD AUTO: 0.62 K/UL — SIGNIFICANT CHANGE UP (ref 0–0.9)
MONOCYTES # BLD AUTO: 0.62 K/UL — SIGNIFICANT CHANGE UP (ref 0–0.9)
MONOCYTES NFR BLD AUTO: 7 % — SIGNIFICANT CHANGE UP (ref 2–14)
MONOCYTES NFR BLD AUTO: 7.2 % — SIGNIFICANT CHANGE UP (ref 2–14)
MONOCYTES NFR BLD AUTO: 8.2 % — SIGNIFICANT CHANGE UP (ref 2–14)
NEUTROPHILS # BLD AUTO: 4.23 K/UL — SIGNIFICANT CHANGE UP (ref 1.8–7.4)
NEUTROPHILS # BLD AUTO: 6.27 K/UL — SIGNIFICANT CHANGE UP (ref 1.8–7.4)
NEUTROPHILS # BLD AUTO: 6.39 K/UL — SIGNIFICANT CHANGE UP (ref 1.8–7.4)
NEUTROPHILS NFR BLD AUTO: 61.1 % — SIGNIFICANT CHANGE UP (ref 43–77)
NEUTROPHILS NFR BLD AUTO: 70.8 % — SIGNIFICANT CHANGE UP (ref 43–77)
NEUTROPHILS NFR BLD AUTO: 74.5 % — SIGNIFICANT CHANGE UP (ref 43–77)
NON HDL CHOLESTEROL: 142 MG/DL — HIGH
NRBC # BLD: 0 /100 WBCS — SIGNIFICANT CHANGE UP (ref 0–0)
NRBC # FLD: 0 K/UL — SIGNIFICANT CHANGE UP (ref 0–0)
NT-PROBNP SERPL-SCNC: 543 PG/ML — HIGH
PCO2 BLDV: 50 MMHG — SIGNIFICANT CHANGE UP (ref 42–55)
PCO2 BLDV: 53 MMHG — SIGNIFICANT CHANGE UP (ref 42–55)
PH BLDV: 7.32 — SIGNIFICANT CHANGE UP (ref 7.32–7.43)
PH BLDV: 7.34 — SIGNIFICANT CHANGE UP (ref 7.32–7.43)
PHOSPHATE SERPL-MCNC: 3 MG/DL — SIGNIFICANT CHANGE UP (ref 2.5–4.5)
PLATELET # BLD AUTO: 149 K/UL — LOW (ref 150–400)
PLATELET # BLD AUTO: 171 K/UL — SIGNIFICANT CHANGE UP (ref 150–400)
PLATELET # BLD AUTO: 174 K/UL — SIGNIFICANT CHANGE UP (ref 150–400)
PO2 BLDV: 25 MMHG — SIGNIFICANT CHANGE UP (ref 25–45)
PO2 BLDV: 33 MMHG — SIGNIFICANT CHANGE UP (ref 25–45)
POTASSIUM BLDV-SCNC: 3.9 MMOL/L — SIGNIFICANT CHANGE UP (ref 3.5–5.1)
POTASSIUM BLDV-SCNC: 3.9 MMOL/L — SIGNIFICANT CHANGE UP (ref 3.5–5.1)
POTASSIUM SERPL-MCNC: 3.4 MMOL/L — LOW (ref 3.5–5.3)
POTASSIUM SERPL-MCNC: 3.7 MMOL/L — SIGNIFICANT CHANGE UP (ref 3.5–5.3)
POTASSIUM SERPL-MCNC: 5.8 MMOL/L — HIGH (ref 3.5–5.3)
POTASSIUM SERPL-SCNC: 3.4 MMOL/L — LOW (ref 3.5–5.3)
POTASSIUM SERPL-SCNC: 3.7 MMOL/L — SIGNIFICANT CHANGE UP (ref 3.5–5.3)
POTASSIUM SERPL-SCNC: 5.8 MMOL/L — HIGH (ref 3.5–5.3)
PROT SERPL-MCNC: 7 G/DL — SIGNIFICANT CHANGE UP (ref 6–8.3)
PROT SERPL-MCNC: 7.1 G/DL — SIGNIFICANT CHANGE UP (ref 6–8.3)
PROT SERPL-MCNC: 7.4 G/DL — SIGNIFICANT CHANGE UP (ref 6–8.3)
PROTHROM AB SERPL-ACNC: 12.3 SEC — SIGNIFICANT CHANGE UP (ref 9.5–13)
RAPID RVP RESULT: SIGNIFICANT CHANGE UP
RBC # BLD: 4.34 M/UL — SIGNIFICANT CHANGE UP (ref 4.2–5.8)
RBC # BLD: 4.5 M/UL — SIGNIFICANT CHANGE UP (ref 4.2–5.8)
RBC # BLD: 4.8 M/UL — SIGNIFICANT CHANGE UP (ref 4.2–5.8)
RBC # FLD: 13.3 % — SIGNIFICANT CHANGE UP (ref 10.3–14.5)
RBC # FLD: 14.1 % — SIGNIFICANT CHANGE UP (ref 10.3–14.5)
RBC # FLD: 14.1 % — SIGNIFICANT CHANGE UP (ref 10.3–14.5)
RSV RNA NPH QL NAA+NON-PROBE: SIGNIFICANT CHANGE UP
RSV RNA SPEC QL NAA+PROBE: SIGNIFICANT CHANGE UP
RV+EV RNA SPEC QL NAA+PROBE: SIGNIFICANT CHANGE UP
SAO2 % BLDV: 32.2 % — LOW (ref 67–88)
SAO2 % BLDV: 52.6 % — LOW (ref 67–88)
SARS-COV-2 RNA SPEC QL NAA+PROBE: SIGNIFICANT CHANGE UP
SARS-COV-2 RNA SPEC QL NAA+PROBE: SIGNIFICANT CHANGE UP
SODIUM SERPL-SCNC: 135 MMOL/L — SIGNIFICANT CHANGE UP (ref 135–145)
SODIUM SERPL-SCNC: 138 MMOL/L — SIGNIFICANT CHANGE UP (ref 135–145)
SODIUM SERPL-SCNC: 141 MMOL/L — SIGNIFICANT CHANGE UP (ref 135–145)
TRIGL SERPL-MCNC: 187 MG/DL — HIGH
TROPONIN T, HIGH SENSITIVITY RESULT: 12 NG/L — SIGNIFICANT CHANGE UP
TROPONIN T, HIGH SENSITIVITY RESULT: 12 NG/L — SIGNIFICANT CHANGE UP
TROPONIN T, HIGH SENSITIVITY RESULT: 17 NG/L — SIGNIFICANT CHANGE UP
TSH SERPL-MCNC: 3.59 UIU/ML — SIGNIFICANT CHANGE UP (ref 0.27–4.2)
WBC # BLD: 6.93 K/UL — SIGNIFICANT CHANGE UP (ref 3.8–10.5)
WBC # BLD: 8.57 K/UL — SIGNIFICANT CHANGE UP (ref 3.8–10.5)
WBC # BLD: 8.85 K/UL — SIGNIFICANT CHANGE UP (ref 3.8–10.5)
WBC # FLD AUTO: 6.93 K/UL — SIGNIFICANT CHANGE UP (ref 3.8–10.5)
WBC # FLD AUTO: 8.57 K/UL — SIGNIFICANT CHANGE UP (ref 3.8–10.5)
WBC # FLD AUTO: 8.85 K/UL — SIGNIFICANT CHANGE UP (ref 3.8–10.5)

## 2024-01-01 PROCEDURE — 99497 ADVNCD CARE PLAN 30 MIN: CPT | Mod: 25

## 2024-01-01 PROCEDURE — 93010 ELECTROCARDIOGRAM REPORT: CPT

## 2024-01-01 PROCEDURE — 99498 ADVNCD CARE PLAN ADDL 30 MIN: CPT

## 2024-01-01 PROCEDURE — 99223 1ST HOSP IP/OBS HIGH 75: CPT | Mod: GC

## 2024-01-01 PROCEDURE — 99239 HOSP IP/OBS DSCHRG MGMT >30: CPT

## 2024-01-01 PROCEDURE — 71275 CT ANGIOGRAPHY CHEST: CPT | Mod: 26,MC

## 2024-01-01 PROCEDURE — 99497 ADVNCD CARE PLAN 30 MIN: CPT

## 2024-01-01 PROCEDURE — 99212 OFFICE O/P EST SF 10 MIN: CPT | Mod: 95

## 2024-01-01 PROCEDURE — 99223 1ST HOSP IP/OBS HIGH 75: CPT

## 2024-01-01 PROCEDURE — 71045 X-RAY EXAM CHEST 1 VIEW: CPT | Mod: 26

## 2024-01-01 PROCEDURE — 99233 SBSQ HOSP IP/OBS HIGH 50: CPT

## 2024-01-01 PROCEDURE — 93306 TTE W/DOPPLER COMPLETE: CPT | Mod: 26

## 2024-01-01 PROCEDURE — 99285 EMERGENCY DEPT VISIT HI MDM: CPT

## 2024-01-01 PROCEDURE — 99498 ADVNCD CARE PLAN ADDL 30 MIN: CPT | Mod: 25

## 2024-01-01 PROCEDURE — 99213 OFFICE O/P EST LOW 20 MIN: CPT

## 2024-01-01 RX ORDER — LEVOFLOXACIN 5 MG/ML
1 INJECTION, SOLUTION INTRAVENOUS
Qty: 14 | Refills: 0
Start: 2024-01-01 | End: 2024-01-01

## 2024-01-01 RX ORDER — LATANOPROST 0.05 MG/ML
1 SOLUTION/ DROPS OPHTHALMIC; TOPICAL
Qty: 0 | Refills: 0 | DISCHARGE

## 2024-01-01 RX ORDER — ONDANSETRON 8 MG/1
4 TABLET, FILM COATED ORAL EVERY 8 HOURS
Refills: 0 | Status: DISCONTINUED | OUTPATIENT
Start: 2024-01-01 | End: 2024-01-01

## 2024-01-01 RX ORDER — TRAZODONE HCL 50 MG
100 TABLET ORAL AT BEDTIME
Refills: 0 | Status: DISCONTINUED | OUTPATIENT
Start: 2024-01-01 | End: 2024-01-01

## 2024-01-01 RX ORDER — LATANOPROST 0.05 MG/ML
1 SOLUTION/ DROPS OPHTHALMIC; TOPICAL AT BEDTIME
Refills: 0 | Status: DISCONTINUED | OUTPATIENT
Start: 2024-01-01 | End: 2024-01-01

## 2024-01-01 RX ORDER — TRAZODONE HCL 50 MG
1 TABLET ORAL
Refills: 0 | DISCHARGE

## 2024-01-01 RX ORDER — BUDESONIDE, MICRONIZED 100 %
2 POWDER (GRAM) MISCELLANEOUS
Qty: 1 | Refills: 0
Start: 2024-01-01 | End: 2024-06-15

## 2024-01-01 RX ORDER — GABAPENTIN 300 MG/1
300 CAPSULE ORAL 3 TIMES DAILY
Qty: 270 | Refills: 1 | Status: ACTIVE | COMMUNITY
Start: 2018-08-03 | End: 1900-01-01

## 2024-01-01 RX ORDER — OXYBUTYNIN CHLORIDE 5 MG/1
5 TABLET ORAL TWICE DAILY
Qty: 180 | Refills: 1 | Status: ACTIVE | COMMUNITY
Start: 2021-10-11 | End: 1900-01-01

## 2024-01-01 RX ORDER — BUDESONIDE 90 UG/1
90 AEROSOL, POWDER RESPIRATORY (INHALATION)
Refills: 0 | Status: ACTIVE | COMMUNITY
Start: 2024-01-01

## 2024-01-01 RX ORDER — DORZOLAMIDE HYDROCHLORIDE TIMOLOL MALEATE 20; 5 MG/ML; MG/ML
1 SOLUTION/ DROPS OPHTHALMIC
Refills: 0 | DISCHARGE

## 2024-01-01 RX ORDER — DORZOLAMIDE HYDROCHLORIDE TIMOLOL MALEATE 20; 5 MG/ML; MG/ML
1 SOLUTION/ DROPS OPHTHALMIC
Qty: 0 | Refills: 0 | DISCHARGE

## 2024-01-01 RX ORDER — MORPHINE SULFATE 10 MG/5ML
10 SOLUTION ORAL
Qty: 1 | Refills: 0 | Status: ACTIVE | COMMUNITY
Start: 2024-01-01 | End: 1900-01-01

## 2024-01-01 RX ORDER — POTASSIUM CHLORIDE 20 MEQ
40 PACKET (EA) ORAL ONCE
Refills: 0 | Status: COMPLETED | OUTPATIENT
Start: 2024-01-01 | End: 2024-01-01

## 2024-01-01 RX ORDER — GABAPENTIN 400 MG/1
1 CAPSULE ORAL
Refills: 0 | DISCHARGE

## 2024-01-01 RX ORDER — MORPHINE SULFATE 50 MG/1
1.25 CAPSULE, EXTENDED RELEASE ORAL
Qty: 105 | Refills: 0
Start: 2024-01-01 | End: 2024-01-01

## 2024-01-01 RX ORDER — FAMOTIDINE 10 MG/ML
1 INJECTION INTRAVENOUS
Qty: 0 | Refills: 0 | DISCHARGE

## 2024-01-01 RX ORDER — IPRATROPIUM/ALBUTEROL SULFATE 18-103MCG
3 AEROSOL WITH ADAPTER (GRAM) INHALATION ONCE
Refills: 0 | Status: COMPLETED | OUTPATIENT
Start: 2024-01-01 | End: 2024-01-01

## 2024-01-01 RX ORDER — LOSARTAN POTASSIUM 100 MG/1
50 TABLET, FILM COATED ORAL DAILY
Refills: 0 | Status: DISCONTINUED | OUTPATIENT
Start: 2024-01-01 | End: 2024-01-01

## 2024-01-01 RX ORDER — GLYCOPYRROLATE AND FORMOTEROL FUMARATE 9; 4.8 UG/1; UG/1
2 AEROSOL, METERED RESPIRATORY (INHALATION)
Refills: 0 | Status: DISCONTINUED | OUTPATIENT
Start: 2024-01-01 | End: 2024-01-01

## 2024-01-01 RX ORDER — ACETAMINOPHEN 500 MG
1000 TABLET ORAL ONCE
Refills: 0 | Status: COMPLETED | OUTPATIENT
Start: 2024-01-01 | End: 2024-01-01

## 2024-01-01 RX ORDER — ALBUTEROL 90 UG/1
1 AEROSOL, METERED ORAL EVERY 6 HOURS
Refills: 0 | Status: DISCONTINUED | OUTPATIENT
Start: 2024-01-01 | End: 2024-01-01

## 2024-01-01 RX ORDER — BUDESONIDE, MICRONIZED 100 %
2 POWDER (GRAM) MISCELLANEOUS
Refills: 0 | Status: DISCONTINUED | OUTPATIENT
Start: 2024-01-01 | End: 2024-01-01

## 2024-01-01 RX ORDER — OXYBUTYNIN CHLORIDE 5 MG
1 TABLET ORAL
Refills: 0 | DISCHARGE

## 2024-01-01 RX ORDER — LOSARTAN POTASSIUM 100 MG/1
1 TABLET, FILM COATED ORAL
Qty: 0 | Refills: 0 | DISCHARGE

## 2024-01-01 RX ORDER — DORZOLAMIDE HYDROCHLORIDE TIMOLOL MALEATE 20; 5 MG/ML; MG/ML
1 SOLUTION/ DROPS OPHTHALMIC
Refills: 0 | Status: DISCONTINUED | OUTPATIENT
Start: 2024-01-01 | End: 2024-01-01

## 2024-01-01 RX ORDER — BUDESONIDE, GLYCOPYRROLATE, AND FORMOTEROL FUMARATE 160; 9; 4.8 UG/1; UG/1; UG/1
160-9-4.8 AEROSOL, METERED RESPIRATORY (INHALATION)
Qty: 10.7 | Refills: 1 | Status: ACTIVE | COMMUNITY
Start: 2023-01-01 | End: 1900-01-01

## 2024-01-01 RX ORDER — MORPHINE SULFATE 50 MG/1
2.5 CAPSULE, EXTENDED RELEASE ORAL EVERY 4 HOURS
Refills: 0 | Status: DISCONTINUED | OUTPATIENT
Start: 2024-01-01 | End: 2024-01-01

## 2024-01-01 RX ORDER — MOMETASONE FUROATE 220 UG/1
1 INHALANT RESPIRATORY (INHALATION) DAILY
Refills: 0 | Status: DISCONTINUED | OUTPATIENT
Start: 2024-01-01 | End: 2024-01-01

## 2024-01-01 RX ORDER — FAMOTIDINE 10 MG/ML
40 INJECTION INTRAVENOUS
Refills: 0 | Status: DISCONTINUED | OUTPATIENT
Start: 2024-01-01 | End: 2024-01-01

## 2024-01-01 RX ORDER — ACETAMINOPHEN 500 MG
650 TABLET ORAL EVERY 6 HOURS
Refills: 0 | Status: DISCONTINUED | OUTPATIENT
Start: 2024-01-01 | End: 2024-01-01

## 2024-01-01 RX ORDER — FAMOTIDINE 40 MG/1
40 TABLET, FILM COATED ORAL
Qty: 180 | Refills: 3 | Status: ACTIVE | COMMUNITY
Start: 2017-05-26 | End: 1900-01-01

## 2024-01-01 RX ORDER — OXYBUTYNIN CHLORIDE 5 MG
5 TABLET ORAL
Refills: 0 | Status: DISCONTINUED | OUTPATIENT
Start: 2024-01-01 | End: 2024-01-01

## 2024-01-01 RX ORDER — FUROSEMIDE 40 MG
20 TABLET ORAL ONCE
Refills: 0 | Status: COMPLETED | OUTPATIENT
Start: 2024-01-01 | End: 2024-01-01

## 2024-01-01 RX ORDER — TRAZODONE HYDROCHLORIDE 100 MG/1
100 TABLET ORAL
Qty: 180 | Refills: 1 | Status: ACTIVE | COMMUNITY
Start: 2019-02-22 | End: 1900-01-01

## 2024-01-01 RX ORDER — TAMSULOSIN HYDROCHLORIDE 0.4 MG/1
0.4 CAPSULE ORAL AT BEDTIME
Refills: 0 | Status: DISCONTINUED | OUTPATIENT
Start: 2024-01-01 | End: 2024-01-01

## 2024-01-01 RX ORDER — TAMSULOSIN HYDROCHLORIDE 0.4 MG/1
1 CAPSULE ORAL
Refills: 0 | DISCHARGE

## 2024-01-01 RX ORDER — SENNA PLUS 8.6 MG/1
2 TABLET ORAL
Qty: 0 | Refills: 0 | DISCHARGE
Start: 2024-01-01

## 2024-01-01 RX ORDER — DOCUSATE SODIUM 100 MG
1 CAPSULE ORAL
Refills: 0 | DISCHARGE

## 2024-01-01 RX ORDER — SENNA PLUS 8.6 MG/1
2 TABLET ORAL AT BEDTIME
Refills: 0 | Status: DISCONTINUED | OUTPATIENT
Start: 2024-01-01 | End: 2024-01-01

## 2024-01-01 RX ORDER — FAMOTIDINE 10 MG/ML
20 INJECTION INTRAVENOUS DAILY
Refills: 0 | Status: DISCONTINUED | OUTPATIENT
Start: 2024-01-01 | End: 2024-01-01

## 2024-01-01 RX ORDER — ATORVASTATIN CALCIUM 80 MG/1
10 TABLET, FILM COATED ORAL AT BEDTIME
Refills: 0 | Status: DISCONTINUED | OUTPATIENT
Start: 2024-01-01 | End: 2024-01-01

## 2024-01-01 RX ORDER — ATORVASTATIN CALCIUM 80 MG/1
1 TABLET, FILM COATED ORAL
Qty: 0 | Refills: 0 | DISCHARGE

## 2024-01-01 RX ORDER — LANOLIN ALCOHOL/MO/W.PET/CERES
3 CREAM (GRAM) TOPICAL AT BEDTIME
Refills: 0 | Status: DISCONTINUED | OUTPATIENT
Start: 2024-01-01 | End: 2024-01-01

## 2024-01-01 RX ORDER — GABAPENTIN 400 MG/1
300 CAPSULE ORAL THREE TIMES A DAY
Refills: 0 | Status: DISCONTINUED | OUTPATIENT
Start: 2024-01-01 | End: 2024-01-01

## 2024-01-01 RX ORDER — ALBUTEROL SULFATE 90 UG/1
108 (90 BASE) INHALANT RESPIRATORY (INHALATION)
Qty: 1 | Refills: 1 | Status: ACTIVE | COMMUNITY
Start: 2024-01-01 | End: 1900-01-01

## 2024-01-01 RX ADMIN — Medication 5 MILLIGRAM(S): at 17:22

## 2024-01-01 RX ADMIN — DORZOLAMIDE HYDROCHLORIDE TIMOLOL MALEATE 1 DROP(S): 20; 5 SOLUTION/ DROPS OPHTHALMIC at 17:23

## 2024-01-01 RX ADMIN — LOSARTAN POTASSIUM 50 MILLIGRAM(S): 100 TABLET, FILM COATED ORAL at 07:33

## 2024-01-01 RX ADMIN — DORZOLAMIDE HYDROCHLORIDE TIMOLOL MALEATE 1 DROP(S): 20; 5 SOLUTION/ DROPS OPHTHALMIC at 17:13

## 2024-01-01 RX ADMIN — Medication 2 PUFF(S): at 11:38

## 2024-01-01 RX ADMIN — GABAPENTIN 300 MILLIGRAM(S): 400 CAPSULE ORAL at 21:31

## 2024-01-01 RX ADMIN — GLYCOPYRROLATE AND FORMOTEROL FUMARATE 2 PUFF(S): 9; 4.8 AEROSOL, METERED RESPIRATORY (INHALATION) at 21:32

## 2024-01-01 RX ADMIN — SENNA PLUS 2 TABLET(S): 8.6 TABLET ORAL at 21:31

## 2024-01-01 RX ADMIN — Medication 2 PUFF(S): at 21:33

## 2024-01-01 RX ADMIN — Medication 100 MILLIGRAM(S): at 21:32

## 2024-01-01 RX ADMIN — LOSARTAN POTASSIUM 50 MILLIGRAM(S): 100 TABLET, FILM COATED ORAL at 05:25

## 2024-01-01 RX ADMIN — GLYCOPYRROLATE AND FORMOTEROL FUMARATE 2 PUFF(S): 9; 4.8 AEROSOL, METERED RESPIRATORY (INHALATION) at 11:37

## 2024-01-01 RX ADMIN — Medication 3 MILLILITER(S): at 21:56

## 2024-01-01 RX ADMIN — Medication 400 MILLIGRAM(S): at 04:39

## 2024-01-01 RX ADMIN — GABAPENTIN 300 MILLIGRAM(S): 400 CAPSULE ORAL at 05:28

## 2024-01-01 RX ADMIN — Medication 5 MILLIGRAM(S): at 07:33

## 2024-01-01 RX ADMIN — GABAPENTIN 300 MILLIGRAM(S): 400 CAPSULE ORAL at 12:04

## 2024-01-01 RX ADMIN — DORZOLAMIDE HYDROCHLORIDE TIMOLOL MALEATE 1 DROP(S): 20; 5 SOLUTION/ DROPS OPHTHALMIC at 07:33

## 2024-01-01 RX ADMIN — Medication 125 MILLIGRAM(S): at 21:56

## 2024-01-01 RX ADMIN — ATORVASTATIN CALCIUM 10 MILLIGRAM(S): 80 TABLET, FILM COATED ORAL at 21:44

## 2024-01-01 RX ADMIN — GABAPENTIN 300 MILLIGRAM(S): 400 CAPSULE ORAL at 07:42

## 2024-01-01 RX ADMIN — DORZOLAMIDE HYDROCHLORIDE TIMOLOL MALEATE 1 DROP(S): 20; 5 SOLUTION/ DROPS OPHTHALMIC at 05:25

## 2024-01-01 RX ADMIN — Medication 20 MILLIGRAM(S): at 00:01

## 2024-01-01 RX ADMIN — Medication 5 MILLIGRAM(S): at 17:13

## 2024-01-01 RX ADMIN — GLYCOPYRROLATE AND FORMOTEROL FUMARATE 2 PUFF(S): 9; 4.8 AEROSOL, METERED RESPIRATORY (INHALATION) at 09:22

## 2024-01-01 RX ADMIN — FAMOTIDINE 20 MILLIGRAM(S): 10 INJECTION INTRAVENOUS at 11:39

## 2024-01-01 RX ADMIN — Medication 40 MILLIEQUIVALENT(S): at 12:05

## 2024-01-01 RX ADMIN — Medication 5 MILLIGRAM(S): at 05:25

## 2024-01-01 RX ADMIN — Medication 2 PUFF(S): at 09:25

## 2024-01-01 RX ADMIN — GABAPENTIN 300 MILLIGRAM(S): 400 CAPSULE ORAL at 12:23

## 2024-01-01 RX ADMIN — LATANOPROST 1 DROP(S): 0.05 SOLUTION/ DROPS OPHTHALMIC; TOPICAL at 21:32

## 2024-01-01 RX ADMIN — TAMSULOSIN HYDROCHLORIDE 0.4 MILLIGRAM(S): 0.4 CAPSULE ORAL at 21:31

## 2024-01-01 RX ADMIN — FAMOTIDINE 20 MILLIGRAM(S): 10 INJECTION INTRAVENOUS at 12:04

## 2024-01-18 NOTE — HISTORY OF PRESENT ILLNESS
[FreeTextEntry8] : cc-- cough with hemoptysis\par \par 67 yo M presenting for cough with hemoptysis for past 2 weeks. Fresh blood with clotted blood, about 1 small spoonful. Has subjective fevers but does not have temps when checked with thermometer. Having MATTHEWS more than normal. No hx TB but still smokes-- 2 cigs/day. Was smoking since 23 years old, 1 ppd at most. Started to cut down on smoking since [place] came [place] this [place] ago. Currently, he is at 2 cigs/day. No weight loss. \par \par [place] amoxicillin from home country for 12 days, not felt better. Had left sided chest tightness/pain down arm and legs that resolved after taking amoxicillin. He does not leave the house. Lives with son, daughter in law and grandkids. \par  18-Jan-2024 21:42

## 2024-05-15 NOTE — H&P ADULT - NSICDXPASTSURGICALHX_GEN_ALL_CORE_FT
Patient needs refill.  
PAST SURGICAL HISTORY:  History of biopsy of bladder     S/P right cataract extraction

## 2024-05-15 NOTE — H&P ADULT - CONVERSATION DETAILS
Spoke with daughter about patient's chronic disease, likely progression of said disease, and advanced directives. Daughter states that the patient has a wife and she is his only child. Said that the patient in the past has said that he would want to be at home if he were actively dying but states that he has not made any official decisions with regards to advanced directives. She states that she will have a deepak discussion with the patient about his wishes re: advanced directives. Patient inquired about lung transplantation and I informed then we would have pulmonary evaluate patient for that. Spoke about palliative care and home hospice with daughter and she was receptive to meet with palliative care and arrange for home hospice to help with patient's symptoms. Patient is currently full code.

## 2024-05-15 NOTE — ED ADULT TRIAGE NOTE - CHIEF COMPLAINT QUOTE
Patient brought to ER by EMS from MD office after he had an appointment for his COPD. Sent for hypoxia. 93% with 6 liters of O2. Home O2 at 4 liters.

## 2024-05-15 NOTE — H&P ADULT - NSHPLABSRESULTS_GEN_ALL_CORE
LABS and ADDITIONAL STUDIES:                        14.8   6.93  )-----------( 171      ( 15 May 2024 17:46 )             42.7     138  |  105  |  11  ----------------------------<  114<H>     05-15  3.7   |  22  |  0.97    Ca    8.6      15 May 2024 17:46    TPro  7.1  /  Alb  4.3  /  TBili  0.6  /  DBili  x   /  AST  19  /  ALT  14  /  AlkPhos  91  05-15    PT/INR: 12.3/1.10 (05-15-24 @ 17:46)  PTT: 32.3 (05-15-24 @ 17:46)    17:46 - VBG - pH: 7.34  | pCO2: 50    | pO2: 25    | Lactate: 1.1      hs Troponin, T - 12 ng/L (05-15-24 @ 18:48)  hs Troponin, T - 12 ng/L (05-15-24 @ 17:46)    Pro-BNP: 543 (05-15-24 @ 17:46)    RVP - negative    < from: Xray Chest 1 View- PORTABLE-Urgent (05.15.24 @ 18:33) >  ******PRELIMINARY REPORT******    FINDINGS:  No focal consolidation.  Mild diffuse interstitial prominence.  No pleural effusion or pneumothorax.  Heart size is within normal limits.  No acute abnormality within visible osseous structures.    IMPRESSION:  Mild pulmonary edema.  < end of copied text >    < from: CT Angio Chest PE Protocol w/ IV Cont (05.15.24 @ 20:03) >  FINDINGS:  LUNGS AND AIRWAYS: There are secretions within the trachea and rubina. Emphysematous changes in bilateral lungs. Right lower calcified granuloma.  PLEURA: No pleural effusion.  MEDIASTINUM AND RICK: A few lymph nodes are present in the pretracheal space, AP window and the subcarinal region. They are unchanged when compared to previous exam.  VESSELS: No main, lobar, segmental or subsegmental pulmonary embolism.  HEART: Heart size is normal. No pericardial effusion.  CHEST WALL AND LOWER NECK: Bilateral gynecomastia.  VISUALIZED UPPER ABDOMEN: Within normal limits.  BONES: Degenerative changes.    IMPRESSION:  1.  No pulmonary embolism.  --- End of Report ---  < end of copied text >    EKG - LABS and ADDITIONAL STUDIES:                        14.8   6.93  )-----------( 171      ( 15 May 2024 17:46 )             42.7     138  |  105  |  11  ----------------------------<  114<H>     05-15  3.7   |  22  |  0.97    Ca    8.6      15 May 2024 17:46    TPro  7.1  /  Alb  4.3  /  TBili  0.6  /  DBili  x   /  AST  19  /  ALT  14  /  AlkPhos  91  05-15    PT/INR: 12.3/1.10 (05-15-24 @ 17:46)  PTT: 32.3 (05-15-24 @ 17:46)    17:46 - VBG - pH: 7.34  | pCO2: 50    | pO2: 25    | Lactate: 1.1      hs Troponin, T - 12 ng/L (05-15-24 @ 18:48)  hs Troponin, T - 12 ng/L (05-15-24 @ 17:46)    Pro-BNP: 543 (05-15-24 @ 17:46)    RVP - negative    < from: Xray Chest 1 View- PORTABLE-Urgent (05.15.24 @ 18:33) >  ******PRELIMINARY REPORT******    FINDINGS:  No focal consolidation.  Mild diffuse interstitial prominence.  No pleural effusion or pneumothorax.  Heart size is within normal limits.  No acute abnormality within visible osseous structures.    IMPRESSION:  Mild pulmonary edema.  < end of copied text >    < from: CT Angio Chest PE Protocol w/ IV Cont (05.15.24 @ 20:03) >  FINDINGS:  LUNGS AND AIRWAYS: There are secretions within the trachea and rubina. Emphysematous changes in bilateral lungs. Right lower calcified granuloma.  PLEURA: No pleural effusion.  MEDIASTINUM AND RICK: A few lymph nodes are present in the pretracheal space, AP window and the subcarinal region. They are unchanged when compared to previous exam.  VESSELS: No main, lobar, segmental or subsegmental pulmonary embolism.  HEART: Heart size is normal. No pericardial effusion.  CHEST WALL AND LOWER NECK: Bilateral gynecomastia.  VISUALIZED UPPER ABDOMEN: Within normal limits.  BONES: Degenerative changes.    IMPRESSION:  1.  No pulmonary embolism.  --- End of Report ---  < end of copied text >    EKG - NSR at 70, QTc 436, TWI II/III/aVF, V3-V6 new from prior; repeat EKG with stable findings

## 2024-05-15 NOTE — H&P ADULT - NSICDXPASTMEDICALHX_GEN_ALL_CORE_FT
PAST MEDICAL HISTORY:  Anxiety     Benign prostatic hypertrophy     Bruxism     Chronic obstructive pulmonary disease (COPD)     Cigarette smoker     Glaucoma     History of GABY infection     HLD (hyperlipidemia)     HTN (hypertension)     TIA (transient ischemic attack)

## 2024-05-15 NOTE — HISTORY OF PRESENT ILLNESS
[TextBox_4] : 68M COPD with very poor fucntional; status, recommended hospice last visit, coming in for follow up. In office sat matiantied < 80%, emergency oxygen applied. Having coughing recently.

## 2024-05-15 NOTE — H&P ADULT - PROBLEM SELECTOR PLAN 2
- c/w NC 6L as above, c/w home inhaler therapy with therapeutic interchanges  - Pulmonary eval in AM  - Check VBG in AM

## 2024-05-15 NOTE — ED PROVIDER NOTE - CLINICAL SUMMARY MEDICAL DECISION MAKING FREE TEXT BOX
68 male past medical history COPD on 4.5 L at home, hypertension, BPH, TIA, glaucoma sent in by pulmonologist for hypoxia. 92-97% on 4.5L. ECG show some deepened t waves in II/III/aVF/V5/V6. DDx includes but not limited to: baseline hypoxia with exertion, PNA, PE, ACS. Plan: blood work, CTA chest. Will re-assess.

## 2024-05-15 NOTE — H&P ADULT - PROBLEM SELECTOR PLAN 1
- Patient with end stage COPD, reliant on 4L NC ATC, recently noted by family to desaturate to 60s-70s% on 4L with minimal exertion, now increased to 4.5L at home  - At his pulmonologist's office noted to have saturations <80% and sent tot ED for further evaluation  - Patient denies changes to his chronic cough and sputum production, no wheezing noted on examination, fine crackles likely in setting of known underlying emphysematous changes, given lasix in ED but no s/s of fluid overload  - EKG in ED reported to show deepened TWI in inferolateral leads but that EKG not available for review, troponisn negative x2 and patient denies chest pain, palpitations, or LOC at rest or with ambulation  - For now will c/w NC 6L and monitor O2 sats, goal SpO2 of 88-92%  - Check TTE to assess for possible heart failure or other cardiac causes to his worsening hypoxia with exertion, holding off on further lasix diuresis as low suspicion for ADHF presently  - CTA neg for PE  - RVP neg for infection, no fevers/chills at home, no change in cough, no opacities on imaging -> low suspicion for infection, will hold off on abx  - No wheezing with auscultation, no significant pCO2 elevation on VBG -> low suspicion for COPD exacerbation, will hold off on steroids for now  - Patient inquiring about possible lung transplantation -> will defer this to pulmonary (please call in AM) - Patient with end stage COPD, reliant on 4L NC ATC, recently noted by family to desaturate to 60s-70s% on 4L with minimal exertion, now increased to 4.5L at home  - At his pulmonologist's office noted to have saturations <80% and sent tot ED for further evaluation  - Patient denies changes to his chronic cough and sputum production, no wheezing noted on examination, fine crackles likely in setting of known underlying emphysematous changes, given lasix in ED but no s/s of fluid overload  - EKG today with new TWI in inferolateral leads but patient without cardiac complaints (denies chest pain, palpitations, or LOC), repeat EKG with stable TWI in inferior leads with improved T waves in lateral leads, troponin negative x2 -> possibly has some cardiomyopathy now 2/2 long standing COPD -> Check TTE to assess for possible heart failure or other cardiac causes to his worsening hypoxia with exertion, holding off on further lasix diuresis as low suspicion for ADHF presently, monitor on telemetry for now  - For now will c/w NC 6L and monitor O2 sats, goal SpO2 of 88-92%  - CTA neg for PE  - RVP neg for infection, no fevers/chills at home, no change in cough, no opacities on imaging -> low suspicion for infection, will hold off on abx  - No wheezing with auscultation, no significant pCO2 elevation on VBG -> low suspicion for COPD exacerbation, will hold off on steroids for now  - Patient inquiring about possible lung transplantation -> will defer this to pulmonary (please call in AM)

## 2024-05-15 NOTE — H&P ADULT - NSHPSOCIALHISTORY_GEN_ALL_CORE
Lives with family  Ambulatory with cane  Former tobacco user, quit in 2022  Denies EtOH or illicit substance use

## 2024-05-15 NOTE — H&P ADULT - PROBLEM SELECTOR PLAN 8
DVT ppx - SCDs  Diet - DASH Diet  Activity - OOB with exertion, increase as tolerated    Fall and aspiration precautions

## 2024-05-15 NOTE — ED ADULT NURSE NOTE - NSFALLUNIVINTERV_ED_ALL_ED
Bed/Stretcher in lowest position, wheels locked, appropriate side rails in place/Call bell, personal items and telephone in reach/Instruct patient to call for assistance before getting out of bed/chair/stretcher/Non-slip footwear applied when patient is off stretcher/[place] to call system/Physically safe environment - no spills, clutter or unnecessary equipment/Purposeful proactive rounding/Room/bathroom lighting operational, light cord in reach

## 2024-05-15 NOTE — H&P ADULT - TIME-BASED
----- Message from Yolanda Hernandez PA-C sent at 12/27/2022  2:07 PM CST -----  ELIZABETH please call patient She has a UTI, sending out antibiotics.  It looks like not all her lab results are in.  I will check on this today.       80

## 2024-05-15 NOTE — H&P ADULT - NSHPPHYSICALEXAM_GEN_ALL_CORE
Vital Signs Last 24 Hrs  T(C): 36.9 (16 May 2024 00:05), Max: 36.9 (16 May 2024 00:05)  T(F): 98.4 (16 May 2024 00:05), Max: 98.4 (16 May 2024 00:05)  HR: 88 (16 May 2024 02:15) (69 - 88)  BP: 134/98 (16 May 2024 02:15) (134/98 - 167/88)  BP(mean): --  RR: 20 (16 May 2024 02:15) (16 - 20)  SpO2: 97% (16 May 2024 02:15) (72% - 99%)    Parameters below as of 16 May 2024 02:15  Patient On (Oxygen Delivery Method): mask, nonrebreather  O2 Flow (L/min): 6    GENERAL: No acute distress, well-developed  EYES/ENT: EOMI, PERRL, conjunctiva and sclera clear, Neck supple, No JVD, moist mucosa  CHEST/LUNG: faint crackles mostly in the bibasilar regions, no wheezing, good air entry b/l  BACK: No spinal tenderness  HEART: Regular rate and rhythm; No murmurs, rubs, or gallops  ABDOMEN: Soft, Nontender, Nondistended; Bowel sounds present  EXTREMITIES: +DP/PT/Radial, no LE edema or asymmetry noted  PSYCH: Nl behavior, nl affect  NEUROLOGY: AAOx3, non-focal  SKIN: Normal color, No rashes or lesions

## 2024-05-15 NOTE — H&P ADULT - HISTORY OF PRESENT ILLNESS
This is a 68M with history of end stage COPD on 4L NC, GABY (incompletely treated), HTN, HLD, Glaucoma, and GERD who presents to the hospital from his pulmonologist's office for hypoxia to <80s. Patient states that he feels well currently, denies any worsening of his chronic respiratory issues (chronic cough and sputum unchanged). States that he feels SOB with ambulation but improves with rest. Denies chest pain, palpitations, or LOC with ambulation or with rest. Reports orthopnea but denies LE edema. As per daughter, the patient's O2 sats have gone down to 60s-70s% with ambulation, the family have increased his O2 delivery to 4.5L to help with the desaturations. No other acute complaints. As per daughter, patient is essentially home bound and his ambulatory status is limited to short exertion from room to room.     On arrival to the ED his vitals were T 98, P 69, /88, RR 18, O2 sat 99% RA. His lab work did not show significant abnormalities. His VBG and his RVP were without acute findings. His CTA Chest was negative for a PE and showed secretions in the trachea/rubina and bilateral emphysematous changes of the lungs. He was given lasix 20mg IVP x1 and was admitted to medicine on telemetry.  This is a 68M with history of end stage COPD on 4L NC, GABY (incompletely treated), HTN, HLD, Glaucoma, and GERD who presents to the hospital from his pulmonologist's office for hypoxia to <80s. Patient states that he feels well currently, denies any worsening of his chronic respiratory issues (chronic cough and sputum unchanged). States that he feels SOB with ambulation but improves with rest. Denies chest pain, palpitations, or LOC with ambulation or with rest. Reports orthopnea but denies LE edema. As per daughter, the patient's O2 sats have gone down to 60s-70s% with ambulation, the family have increased his O2 delivery to 4.5L to help with the desaturations. No other acute complaints. As per daughter, patient is essentially home bound and his ambulatory status is limited to short exertion from room to room.     On arrival to the ED his vitals were T 98, P 69, /88, RR 18, O2 sat 99% RA. His lab work did not show significant abnormalities. His VBG and his RVP were without acute findings. His CTA Chest was negative for a PE and showed secretions in the trachea/rubina and bilateral emphysematous changes of the lungs. He was given lasix 20mg IVP x1 and was admitted to medicine on telemetry.     Of note, on the GMF, patient was noted to desaturate to 70s while on 6L NC when ambulating to the bathroom.

## 2024-05-15 NOTE — ED PROVIDER NOTE - PHYSICAL EXAMINATION
PHYSICAL EXAM:  GENERAL: Sitting comfortable in bed, in no acute distress  HENMT: Atraumatic, moist mucous membranes  EYES: Clear bilaterally, PERRL, EOMs intact b/l  HEART: Regular rate and regular rhythm, S1/S2  RESPIRATORY: Clear to auscultation bilaterally  ABDOMEN: Soft, nontender, nondistended  EXTREMITIES: No lower extremity edema  NEURO: Alert, follows commands, moving all extremities symmetrically   SKIN: Skin normal color for race, warm, dry

## 2024-05-15 NOTE — ED ADULT NURSE REASSESSMENT NOTE - NS ED NURSE REASSESS COMMENT FT1
Report received from KATHERIN Winters. Pt resting in stretcher, at baseline orientation status, offers no complaints of pain, discomfort or SOB, on 4.5 L NC, o2 sat 91%, chest rise equal bilaterally, appears in NAD, safety maintained, comfort provided, care plan ongoing at this time.

## 2024-05-15 NOTE — ED PROVIDER NOTE - NSICDXPASTMEDICALHX_GEN_ALL_CORE_FT
PAST MEDICAL HISTORY:  Anxiety     Benign prostatic hypertrophy     Bruxism     Cigarette smoker     HTN (hypertension)     TIA (transient ischemic attack)      none

## 2024-05-15 NOTE — ED ADULT NURSE NOTE - OBJECTIVE STATEMENT
Patient presented to ED with a chief complaint of hypoxia. Patient is  on 4.5L at home baseline for COPD and went to pulmonologist and was found to be hypoxic in the 40's-50's and was placed on additional O2 and brought to ED. Patient denies recent travel or illness, cough, n/v/d, chest pain, dizziness.    Patient is A/O x 4, NAD, skin intact, PERRLA, speech clear, neurologically intact with no deficits, strength b/l upper and lower extremities 5/5, sensation intact b/l upper and lower extremities, rate and rhythm regular S1 and S2 heard with no murmurs radial and pedal pulses present, capillary refill <3 , (+) fine crackles 88%-92% SpO2 on 6L NC, cyanotic lips, however patient is asymptomatic, even and unlabored, abdomen soft and non-tender, bowel sounds heard x 4 quadrant, mild edema to lower extremities . Safety maintained, bed in lowest position, call bell within reach, plan of care reviewed with patient , addressed all questions and concern, will continue to monitor patient.

## 2024-05-15 NOTE — ED PROVIDER NOTE - ATTENDING CONTRIBUTION TO CARE
Patient is a 68-year-old male with a history of hypertension, BPH, TIA, COPD on 4.5L O2 at home, glaucoma sent in from his pulmonary doctors office for concern of hypoxia.  Patient does understand some English but daughter is at bedside translating German as necessary.  She states that she was with patient at the pulmonary physician's office.  Patient was noted to have an oxygen sat that dropped down to 61%.  She states that he was not in any distress and had no complaints.  She states at home patient saturation is around 95% at rest.  She states when he ambulates, however, his saturation does drop down to the 80s.  She states that the pulmonologist watch the patient for about 20 minutes and patient remained hypoxic and it only came up to the low 80s.  An ambulance was called and patient was brought to the emergency department for evaluation.  Patient denies any chest pain or shortness of breath.  He denies any fevers, cough.  No leg swelling or pain.      VS noted  Gen. no acute distress, Non toxic, elderly  HEENT: EOMI, mmm  Lungs: CTAB/L no C/ W /R   CVS: RRR   Abd; Soft non tender, non distended   Ext: no edema, no calf tenderness  Skin: no rash  Neuro AAOx3 non focal clear speech  a/p:  hypoxia–patient is around 92% on 4.5L O2 via NC.  He denies any chest pain or shortness of breath.  He denies any infectious symptoms.  On exam, his lungs are clear.  He does not appear to be fluid overloaded.  EKG reviewed and there are deep T wave inversions in the inferior leads.  Will evaluate for ACS.  Will get CTA chest to rule out PE as well as other pulmonary etiology.  If no acute findings, consider discharge home as patient has oxygen at home.  Mirtha Werner MD

## 2024-05-15 NOTE — H&P ADULT - ASSESSMENT
This is a 68M with history as above who presents to the hospital with progressively worsening hypoxia at home here noted to desaturate to 70s% on 6L NC with minimal exertion concerning for progressively worsening chronic respiratory failure.

## 2024-05-15 NOTE — ED PROVIDER NOTE - ST/T WAVE
no st elevations, no st depressions, twi in inferior leads no st elevations, no st depressions, twi in inferior leads and V3-V6

## 2024-05-15 NOTE — ED PROVIDER NOTE - OBJECTIVE STATEMENT
68 male past medical history COPD on 4.5 L at home, hypertension, BPH, TIA, glaucoma sent in by pulmonologist for hypoxia.  Daughter at bedside for translation and collateral.  She says that when the patient was at the pulmonologist office his oxygen saturation was in the 60s to 70s while on 4.5 L of oxygen.  They waited 20 minutes and his oxygen only came up to the low 80s.  EMS was called and brought patient here.  Daughter says that patient was oxygen saturation at home is around 95% when he is not moving.  Whenever he gets up to go to the bathroom his oxygen saturations drops to the 70s.  Patient denies any shortness of breath, chest pain, fever, cough, abdominal pain.

## 2024-05-15 NOTE — H&P ADULT - NSHPREVIEWOFSYSTEMS_GEN_ALL_CORE
REVIEW OF SYSTEMS:    CONSTITUTIONAL: No weakness, fevers or chills  EYES: No visual changes or eye discharge  ENT: No rhinorrhea or sore throat  NECK: No pain or stiffness  RESPIRATORY: +Chronic cough and sputum production without changes, No wheezing, No hemoptysis; +chronic shortness of breath  CARDIOVASCULAR: No chest pain or palpitations; No lower extremity edema  GASTROINTESTINAL: No abdominal or epigastric pain. No nausea, vomiting, or hematemesis; No diarrhea or constipation. No melena or hematochezia.  BACK: No back pain  GENITOURINARY: No dysuria, frequency or hematuria  NEUROLOGICAL: No numbness or weakness  SKIN: No itching, burning, rashes, or lesions

## 2024-05-15 NOTE — ASSESSMENT
[FreeTextEntry1] : 68M end stage COPD here with acute on chronic vs chronic progressive hypoxemia  For ED eval. Need pall care eval in hospital, daughter amenable to home hospice discharge; sending to hospital for acute symptom management, oxygen titration, identification of reversible causes.  F/u when discharged

## 2024-05-16 NOTE — GOALS OF CARE CONVERSATION - ADVANCED CARE PLANNING - CONVERSATION DETAILS
Palliative consulted for complex medical decision making and symptom management in the setting of advanced end staged COPD.  Spoke with pt along with daughter Stephania to introduce role and scope of palliative care team as supportive in the setting of complex comorbidities/serious illnesses, to assist with complex medical decision making and any associated pain or symptom management. We reviewed baseline function, comorbidities, current hospital course and plan of care.  Patient defers decisions to daughter who shared hx.    Pt lives at home with wife and daughter Stephania (who was a physician in [facility]) and daughter's family. Because of his severe lung disease he's mostly bedbound with minimal activity can desaturate  to the 60s and requires frequent inhaler use. He's not been able to go to the doctors because of the severe fatigue/dyspnea with minimal activity. Yesterday they went to the pulmonologist as he's run out of meds and were advised to come to the ED and at this point have been recommended hospice. Stephania shared that it's been hard to hear that dad is at end stage disease. I shared with her that patient is not a candidate for lung transplant or any further advance treatment.     Educated family on the philosophy of hospice care and explained the various settings and criteria in which hospice can be delivered (home vs. nursing home vs. inpatient hospice). Discussed the services provided under hospice services emphasizing the goal of elevating patient's quality of life and optimizing symptom management and avoiding unnecessary future hospitalizations. In addition to symptom management expertise, hospice provides supportive counseling services, nutritional support and chaplaincy services.     Daughter shared that patient does not like to come to the hospital and has always shared that whenever it's his time he would want to pass away at home and not at a hospital. Patient enjoys spending time with his family and hopes to return to [facility] but understands its not safe given his oxygen level. Given his end stage COPD, daughter is amenable to home hospice referral.    Discussed advanced directives including CPR and mechanical ventilation in setting of end stage copd. Reviewed the risks and benefits of these interventions at the EOL in sharing that these interventions might pose more burden than benefit as he does not have a reversible disease. Daughter shared that given his above goals DNR/DNI is in lines with goal. I asked patient if those are his wishes and he shared that death is inevitable in life and as such if it's his time he would not want to be in machines or the ICU and would want to pass away naturally.     Completed MOLST form: DNR/DNI, trial of noninvasive, no feeding tube, no HD, abx use as determined, No ICU or pressors

## 2024-05-16 NOTE — CONSULT NOTE ADULT - PROBLEM SELECTOR RECOMMENDATION 9
Patient at baseline on 4L NC but desaturates to the 60s at home with minimal exertion, as such bedbound  Follows [facility] pulm team -- has not been able to visit inperson because of severe symptoms until yesterday  CT with emphysema; "PFTs outpatient in 2023 were very poor effort limited by dyspnea but had at least moderate obstruction"  Pulm evaluation appreciated -- recommended hospice in the setting of severe emphysema, not candidate for transplant;   PLAN:  Continue triple inhaler therapy (ICS/LABA/LAMA) per pulm  Per conversation with daughter as documented separately, amenable to home hospice referral; DNR/DNI/trial of bipap  For dyspnea: start morphine 2.5 mg q4h prn --> if not effective, increase to 5 mg q4h prn  If severe anxiety: can consider ativan 0.5 mg q6h prn  Bowel regimen on opioid

## 2024-05-16 NOTE — CONSULT NOTE ADULT - SUBJECTIVE AND OBJECTIVE BOX
Date of Bdzqsas51-04-19 @ 17:07  HPI:  This is a 68M with history of end stage COPD on 4L NC, GABY (incompletely treated), HTN, HLD, Glaucoma, and GERD who presents to the hospital from his pulmonologist's office for hypoxia to <80s. Patient states that he feels well currently, denies any worsening of his chronic respiratory issues (chronic cough and sputum unchanged). States that he feels SOB with ambulation but improves with rest. Denies chest pain, palpitations, or LOC with ambulation or with rest. Reports orthopnea but denies LE edema. As per daughter, the patient's O2 sats have gone down to 60s-70s% with ambulation, the family have increased his O2 delivery to 4.5L to help with the desaturations. No other acute complaints. As per daughter, patient is essentially home bound and his ambulatory status is limited to short exertion from room to room.     On arrival to the ED his vitals were T 98, P 69, /88, RR 18, O2 sat 99% RA. His lab work did not show significant abnormalities. His VBG and his RVP were without acute findings. His CTA Chest was negative for a PE and showed secretions in the trachea/rubina and bilateral emphysematous changes of the lungs. He was given lasix 20mg IVP x1 and was admitted to medicine on telemetry.     Of note, on the GMF, patient was noted to desaturate to 70s while on 6L NC when ambulating to the bathroom.  (15 May 2024 23:45)      PERTINENT PM/SXH:   HTN (hypertension)    DM (diabetes mellitus)    TIA (transient ischemic attack)    Anxiety    Bruxism    Benign prostatic hypertrophy    TIA (transient ischemic attack)    Cigarette smoker    Chronic obstructive pulmonary disease (COPD)    HLD (hyperlipidemia)    Glaucoma    History of GABY infection      No significant past surgical history    History of biopsy of bladder    S/P right cataract extraction      FAMILY HISTORY:  No pertinent family history in first degree relatives      Family Hx substance abuse [ ]yes [ ]no    ITEMS NOT CHECKED ARE NOT PRESENT    SOCIAL HISTORY:   Significant other/partner[ ]  Children[ ]  Spiritism/Spirituality:  Substance hx:  [ ]   Tobacco hx:  [ ]   Alcohol hx: [ ]   Home Opioid hx:  [ ] I-Stop Reference No:  Living Situation: [ ]Home  [ ]Long term care  [ ]Rehab [ ]Other    ADVANCE DIRECTIVES:    DNR/MOLST  [ ]  Living Will  [ ]   DECISION MAKER(s):  [ ] Health Care Proxy(s)  [ ] Surrogate(s)  [ ] Guardian           Name(s): Phone Number(s):    BASELINE (I)ADL(s) (prior to admission):  [place]: [ ]Total  [ ] Moderate [ ]Dependent    Allergies    No Known Allergies    Intolerances    MEDICATIONS  (STANDING):  atorvastatin 10 milliGRAM(s) Oral at bedtime  buDESOnide   90 MICROgram(s) Inhaler 2 Puff(s) Inhalation two times a day  dorzolamide 2%/timolol 0.5% Ophthalmic Solution 1 Drop(s) Both EYES two times a day  famotidine    Tablet 20 milliGRAM(s) Oral daily  gabapentin 300 milliGRAM(s) Oral three times a day  glycopyrrolate 9 MICROgram(s)/formoterol 4.8 MICROgram(s) Inhaler 2 Puff(s) Inhalation two times a day  latanoprost 0.005% Ophthalmic Solution 1 Drop(s) Both EYES at bedtime  losartan 50 milliGRAM(s) Oral daily  oxybutynin 5 milliGRAM(s) Oral two times a day  tamsulosin 0.4 milliGRAM(s) Oral at bedtime  traZODone 100 milliGRAM(s) Oral at bedtime    MEDICATIONS  (PRN):  acetaminophen     Tablet .. 650 milliGRAM(s) Oral every 6 hours PRN Temp greater or equal to 38C (100.4F), Mild Pain (1 - 3)  albuterol    90 MICROgram(s) HFA Inhaler 1 Puff(s) Inhalation every 6 hours PRN Shortness of Breath and/or Wheezing  aluminum hydroxide/magnesium hydroxide/simethicone Suspension 30 milliLiter(s) Oral every 4 hours PRN Dyspepsia  bisacodyl 5 milliGRAM(s) Oral every 12 hours PRN Constipation  melatonin 3 milliGRAM(s) Oral at bedtime PRN Insomnia  ondansetron Injectable 4 milliGRAM(s) IV Push every 8 hours PRN Nausea and/or Vomiting    PRESENT SYMPTOMS: [ ]Unable to self-report  [ ] CPOT [ ] PAINADs [ ] RDOS  Source if other than patient:  [ ]Family   [ ]Team     Pain: [ ]yes [ ]no  QOL impact -   Location -                    Aggravating factors -  Quality -  Radiation -  Timing-  Severity (0-10 scale):  Minimal acceptable level/pain goal (0-10 scale):     CPOT:    https://www.[facility].org/getattachment/[email or web address]/Critical-Care-Pain-Observation-Tool-(CPOT)    PAIN AD Score:   http://geriatrictoolkit.[facility]/cog/painad.pdf (press ctrl +  left click to view)    Dyspnea:                           [ ]Mild [ ]Moderate [ ]Severe    RDOS:  0 to 2  minimal or no respiratory distress   3  mild distress  4 to 6 moderate distress  >7 severe distress  https://homecareinformation.net/handouts/hen/Respiratory_Distress_Observation_Scale.pdf (Ctrl +  left click to view)     Anxiety:                             [ ]Mild [ ]Moderate [ ]Severe  Fatigue:                             [ ]Mild [ ]Moderate [ ]Severe  Nausea:                             [ ]Mild [ ]Moderate [ ]Severe  Loss of appetite:              [ ]Mild [ ]Moderate [ ]Severe  Constipation:                    [ ]Mild [ ]Moderate [ ]Severe    PCSSQ[Palliative Care Spiritual Screening Question]   Severity (0-10): deferred  Score of 4 or > indicate consideration of Chaplaincy referral.  Chaplaincy Referral: [ ] yes [ ] refused [ ] following [ ] Deferred     Caregiver [place]? : [ ] yes [ ] no [ ] Deferred [ ] Declined             Social work referral [ ] Patient & Family Centered Care Referral [ ]     Anticipatory Grief present?:  [ ] yes [ ] no  [ ] Deferred                  Social work referral [ ] Chaplaincy Referral[ ]    Other Symptoms:  [ x]All other review of systems negative   [ ] Unable to obtain ROS due to poor mental status     Palliative Performance Status Version 2:         %    http://npcrc.org/files/news/palliative_performance_scale_ppsv2.pdf    PHYSICAL EXAM:  Vital Signs Last 24 Hrs  T(C): 36.9 (16 May 2024 00:05), Max: 36.9 (16 May 2024 00:05)  T(F): 98.4 (16 May 2024 00:05), Max: 98.4 (16 May 2024 00:05)  HR: 86 (16 May 2024 07:15) (70 - 88)  BP: 158/82 (16 May 2024 07:15) (134/98 - 158/82)  BP(mean): --  RR: 18 (16 May 2024 07:15) (16 - 20)  SpO2: 83% (16 May 2024 07:15) (72% - 97%)    Parameters below as of 16 May 2024 07:15  Patient On (Oxygen Delivery Method): nasal cannula  O2 Flow (L/min): 6   I&O's Summary    GENERAL: [ ]Cachexia    [ x]Alert  [ ]Oriented x   [ ]Lethargic  [ ]Unarousable  [x ]Verbal  [ ]Non-Verbal  Behavioral:   [ ] Anxiety  [ ] Delirium [ ] Agitation [ ] Other  HEENT:  [ ]Normal   [x ]Dry mouth   [ ]ET Tube/Trach  [ ]Oral lesions  PULMONARY:   [ x]Clear [ ]Tachypnea  [ ]Audible excessive secretions   [ ]Rhonchi        [ ]Right [ ]Left [ ]Bilateral  [ ]Crackles        [ ]Right [ ]Left [ ]Bilateral  [ ]Wheezing     [ ]Right [ ]Left [ ]Bilateral  [ ]Diminished breath sounds [ ]right [ ]left [ ]bilateral  CARDIOVASCULAR:    [x ]Regular [ ]Irregular [ ]Tachy  [ ]Armani [ ]Murmur [ ]Other  GASTROINTESTINAL:  [x ]Soft  [ ]Distended   [ ]+BS  [x ]Non tender [ ]Tender  [ ]Other [ ]PEG [ ]OGT/ NGT  Last BM:  GENITOURINARY:  [ x]Normal [ ] Incontinent   [ ]Oliguria/Anuria   [ ]Baker  MUSCULOSKELETAL:   [ ]Normal   [ x]Weakness  [ ]Bed/Wheelchair bound [ ]Edema  NEUROLOGIC:   [ ]No focal deficits  [ x]Cognitive impairment  [ ]Dysphagia [ ]Dysarthria [ ]Paresis [ ]Other   SKIN:   [ ]Normal  [ ]Rash  [ ]Other  [ ]Pressure ulcer(s)       Present on admission [ ]y [ ]n      CRITICAL CARE:  [ ] Shock Present  [ ]Septic [ ]Cardiogenic [ ]Neurologic [ ]Hypovolemic  [ ]  Vasopressors [ ]  Inotropes   [ ]Respiratory failure present [ ]Mechanical ventilation [ ]Non-invasive ventilatory support [ ]High flow    [ ]Acute  [ ]Chronic [ ]Hypoxic  [ ]Hypercarbic [ ]Other  [ ]Other organ failure     LABS:                        15.5   8.85  )-----------( 174      ( 16 May 2024 06:47 )             45.2   05-16    135  |  100  |  12  ----------------------------<  202<H>  3.4<L>   |  21<L>  |  0.88    Ca    9.1      16 May 2024 06:57  Phos  3.0     05-16  Mg     2.20     05-16    TPro  7.4  /  Alb  4.0  /  TBili  0.4  /  DBili  x   /  AST  19  /  ALT  15  /  AlkPhos  107  05-16  PT/INR - ( 15 May 2024 17:46 )   PT: 12.3 sec;   INR: 1.10 ratio         PTT - ( 15 May 2024 17:46 )  PTT:32.3 sec    Urinalysis Basic - ( 16 May 2024 06:57 )    Color: x / Appearance: x / SG: x / pH: x  Gluc: 202 mg/dL / Ketone: x  / Bili: x / Urobili: x   Blood: x / Protein: x / Nitrite: x   Leuk Esterase: x / RBC: x / WBC x   Sq Epi: x / Non Sq Epi: x / Bacteria: x      RADIOLOGY & ADDITIONAL STUDIES:    PROTEIN CALORIE MALNUTRITION PRESENT: [ ]mild [ ]moderate [ ]severe [ ]underweight [ ]morbid obesity  https://www.andeal.org/vault/2440/web/files/ONC/Table_Clinical%20Characteristics%20to%20Document%20Malnutrition-White%20JV%20et%20al%202012.pdf    Height (cm): 162.6 (05-16-24 @ 02:10)  Weight (kg): 52.2 (05-16-24 @ 02:10)  BMI (kg/m2): 19.7 (05-16-24 @ 02:10)    [ ]PPSV2 < or = to 30% [ ]significant weight loss  [ ]poor nutritional intake  [ ]anasarca[ ]Artificial Nutrition      Other REFERRALS:  [ ]Hospice  [ ]Child Life  [ ]Social Work  [ ]Case management [ ]Holistic Therapy     Goals of Care Document:  Date of [id number] @ 17:07  HPI:  This is a 68M with history of end stage COPD on 4L NC, GABY (incompletely treated), HTN, HLD, Glaucoma, and GERD who presents to the hospital from his pulmonologist's office for hypoxia to <80s. Patient states that he feels well currently, denies any worsening of his chronic respiratory issues (chronic cough and sputum unchanged). States that he feels SOB with ambulation but improves with rest. Denies chest pain, palpitations, or LOC with ambulation or with rest. Reports orthopnea but denies LE edema. As per daughter, the patient's O2 sats have gone down to 60s-70s% with ambulation, the family have increased his O2 delivery to 4.5L to help with the desaturations. No other acute complaints. As per daughter, patient is essentially home bound and his ambulatory status is limited to short exertion from room to room.     On arrival to the ED his vitals were T 98, P 69, /88, RR 18, O2 sat 99% RA. His lab work did not show significant abnormalities. His VBG and his RVP were without acute findings. His CTA Chest was negative for a PE and showed secretions in the trachea/rubina and bilateral emphysematous changes of the lungs. He was given lasix 20mg IVP x1 and was admitted to medicine on telemetry.     Of note, on the GMF, patient was noted to desaturate to 70s while on 6L NC when ambulating to the bathroom.  (15 May 2024 23:45)    Palliative consulted for complex medical decision making and symptom management in the setting of serious illness.    PERTINENT PM/SXH:   HTN (hypertension)    DM (diabetes mellitus)    TIA (transient ischemic attack)    Anxiety    Bruxism    Benign prostatic hypertrophy    TIA (transient ischemic attack)    Cigarette smoker    Chronic obstructive pulmonary disease (COPD)    HLD (hyperlipidemia)    Glaucoma    History of GABY infection      No significant past surgical history    History of biopsy of bladder    S/P right cataract extraction      FAMILY HISTORY:  No pertinent family history in first degree relatives      Family Hx substance abuse [ ]yes [ ]no    ITEMS NOT CHECKED ARE NOT PRESENT    SOCIAL HISTORY:   Significant other/partner[ ]  Children[ ]  Islam/Spirituality:  Substance hx:  [ ]   Tobacco hx:  [ ]   Alcohol hx: [ ]   Home Opioid hx:  [x ] I-Stop Reference No: [id number]  Living Situation: [ ]Home  [ ]Long term care  [ ]Rehab [ ]Other    ADVANCE DIRECTIVES:    DNR/MOLST  [ ]  Living Will  [ ]   DECISION MAKER(s):  [ ] Health Care Proxy(s)  [ ] Surrogate(s)  [ ] Guardian           Name(s): Phone Number(s):    BASELINE (I)ADL(s) (prior to admission):  [place]: [ ]Total  [ ] Moderate [ ]Dependent    Allergies    No Known Allergies    Intolerances    MEDICATIONS  (STANDING):  atorvastatin 10 milliGRAM(s) Oral at bedtime  buDESOnide   90 MICROgram(s) Inhaler 2 Puff(s) Inhalation two times a day  dorzolamide 2%/timolol 0.5% Ophthalmic Solution 1 Drop(s) Both EYES two times a day  famotidine    Tablet 20 milliGRAM(s) Oral daily  gabapentin 300 milliGRAM(s) Oral three times a day  glycopyrrolate 9 MICROgram(s)/formoterol 4.8 MICROgram(s) Inhaler 2 Puff(s) Inhalation two times a day  latanoprost 0.005% Ophthalmic Solution 1 Drop(s) Both EYES at bedtime  losartan 50 milliGRAM(s) Oral daily  oxybutynin 5 milliGRAM(s) Oral two times a day  tamsulosin 0.4 milliGRAM(s) Oral at bedtime  traZODone 100 milliGRAM(s) Oral at bedtime    MEDICATIONS  (PRN):  acetaminophen     Tablet .. 650 milliGRAM(s) Oral every 6 hours PRN Temp greater or equal to 38C (100.4F), Mild Pain (1 - 3)  albuterol    90 MICROgram(s) HFA Inhaler 1 Puff(s) Inhalation every 6 hours PRN Shortness of Breath and/or Wheezing  aluminum hydroxide/magnesium hydroxide/simethicone Suspension 30 milliLiter(s) Oral every 4 hours PRN Dyspepsia  bisacodyl 5 milliGRAM(s) Oral every 12 hours PRN Constipation  melatonin 3 milliGRAM(s) Oral at bedtime PRN Insomnia  ondansetron Injectable 4 milliGRAM(s) IV Push every 8 hours PRN Nausea and/or Vomiting    PRESENT SYMPTOMS: [ ]Unable to self-report  [ ] CPOT [ ] PAINADs [ ] RDOS  Source if other than patient:  [ ]Family   [ ]Team     Pain: [ ]yes [ ]no  QOL impact -   Location -                    Aggravating factors -  Quality -  Radiation -  Timing-  Severity (0-10 scale):  Minimal acceptable level/pain goal (0-10 scale):     CPOT:    https://www.[facility].org/getattachment/[email or web address]/Critical-Care-Pain-Observation-Tool-(CPOT)    PAIN AD Score:   http://geriatrictoolkit.[facility]/cog/painad.pdf (press ctrl +  left click to view)    Dyspnea:                           [ ]Mild [ ]Moderate [ ]Severe    RDOS:  0 to 2  minimal or no respiratory distress   3  mild distress  4 to 6 moderate distress  >7 severe distress  https://[email or web address]/handouts/hen/Respiratory_Distress_Observation_Scale.pdf (Ctrl +  left click to view)     Anxiety:                             [ ]Mild [ ]Moderate [ ]Severe  Fatigue:                             [ ]Mild [ ]Moderate [ ]Severe  Nausea:                             [ ]Mild [ ]Moderate [ ]Severe  Loss of appetite:              [ ]Mild [ ]Moderate [ ]Severe  Constipation:                    [ ]Mild [ ]Moderate [ ]Severe    PCSSQ[Palliative Care Spiritual Screening Question]   Severity (0-10): deferred  Score of 4 or > indicate consideration of Chaplaincy referral.  Chaplaincy Referral: [ ] yes [ ] refused [ ] following [ ] Deferred     Caregiver [place]? : [ ] yes [ ] no [ ] Deferred [ ] Declined             Social work referral [ ] Patient & Family Centered Care Referral [ ]     Anticipatory Grief present?:  [ ] yes [ ] no  [ ] Deferred                  Social work referral [ ] Chaplaincy Referral[ ]    Other Symptoms:  [ x]All other review of systems negative   [ ] Unable to obtain ROS due to poor mental status     Palliative Performance Status Version 2:         %    http://npcrc.org/files/news/palliative_performance_scale_ppsv2.pdf    PHYSICAL EXAM:  Vital Signs Last 24 Hrs  T(C): 36.9 (16 May 2024 00:05), Max: 36.9 (16 May 2024 00:05)  T(F): 98.4 (16 May 2024 00:05), Max: 98.4 (16 May 2024 00:05)  HR: 86 (16 May 2024 07:15) (70 - 88)  BP: 158/82 (16 May 2024 07:15) (134/98 - 158/82)  BP(mean): --  RR: 18 (16 May 2024 07:15) (16 - 20)  SpO2: 83% (16 May 2024 07:15) (72% - 97%)    Parameters below as of 16 May 2024 07:15  Patient On (Oxygen Delivery Method): nasal cannula  O2 Flow (L/min): 6   I&O's Summary    GENERAL: [ ]Cachexia    [ x]Alert  [ ]Oriented x   [ ]Lethargic  [ ]Unarousable  [x ]Verbal  [ ]Non-Verbal  Behavioral:   [ ] Anxiety  [ ] Delirium [ ] Agitation [ ] Other  HEENT:  [ ]Normal   [x ]Dry mouth   [ ]ET Tube/Trach  [ ]Oral lesions  PULMONARY:   [ x]Clear [ ]Tachypnea  [ ]Audible excessive secretions   [ ]Rhonchi        [ ]Right [ ]Left [ ]Bilateral  [ ]Crackles        [ ]Right [ ]Left [ ]Bilateral  [ ]Wheezing     [ ]Right [ ]Left [ ]Bilateral  [ ]Diminished breath sounds [ ]right [ ]left [ ]bilateral  CARDIOVASCULAR:    [x ]Regular [ ]Irregular [ ]Tachy  [ ]Armani [ ]Murmur [ ]Other  GASTROINTESTINAL:  [x ]Soft  [ ]Distended   [ ]+BS  [x ]Non tender [ ]Tender  [ ]Other [ ]PEG [ ]OGT/ NGT  Last BM:  GENITOURINARY:  [ x]Normal [ ] Incontinent   [ ]Oliguria/Anuria   [ ]Baker  MUSCULOSKELETAL:   [ ]Normal   [ x]Weakness  [ ]Bed/Wheelchair bound [ ]Edema  NEUROLOGIC:   [ ]No focal deficits  [ x]Cognitive impairment  [ ]Dysphagia [ ]Dysarthria [ ]Paresis [ ]Other   SKIN:   [ ]Normal  [ ]Rash  [ ]Other  [ ]Pressure ulcer(s)       Present on admission [ ]y [ ]n      CRITICAL CARE:  [ ] Shock Present  [ ]Septic [ ]Cardiogenic [ ]Neurologic [ ]Hypovolemic  [ ]  Vasopressors [ ]  Inotropes   [ ]Respiratory failure present [ ]Mechanical ventilation [ ]Non-invasive ventilatory support [ ]High flow    [ ]Acute  [ ]Chronic [ ]Hypoxic  [ ]Hypercarbic [ ]Other  [ ]Other organ failure     LABS:                        15.5   8.85  )-----------( 174      ( 16 May 2024 06:47 )             45.2   05-16    135  |  100  |  12  ----------------------------<  202<H>  3.4<L>   |  21<L>  |  0.88    Ca    9.1      16 May 2024 06:57  Phos  3.0     05-16  Mg     2.20     05-16    TPro  7.4  /  Alb  4.0  /  TBili  0.4  /  DBili  x   /  AST  19  /  ALT  15  /  AlkPhos  107  05-16  PT/INR - ( 15 May 2024 17:46 )   PT: 12.3 sec;   INR: 1.10 ratio         PTT - ( 15 May 2024 17:46 )  PTT:32.3 sec    Urinalysis Basic - ( 16 May 2024 06:57 )    Color: x / Appearance: x / SG: x / pH: x  Gluc: 202 mg/dL / Ketone: x  / Bili: x / Urobili: x   Blood: x / Protein: x / Nitrite: x   Leuk Esterase: x / RBC: x / WBC x   Sq Epi: x / Non Sq Epi: x / Bacteria: x      RADIOLOGY & ADDITIONAL STUDIES:    PROTEIN CALORIE MALNUTRITION PRESENT: [ ]mild [ ]moderate [ ]severe [ ]underweight [ ]morbid obesity  https://www.andeal.org/vault/2440/web/files/ONC/Table_Clinical%20Characteristics%20to%20Document%20Malnutrition-White%20JV%20et%20al%202012.pdf    Height (cm): 162.6 (05-16-24 @ 02:10)  Weight (kg): 52.2 (05-16-24 @ 02:10)  BMI (kg/m2): 19.7 (05-16-24 @ 02:10)    [ ]PPSV2 < or = to 30% [ ]significant weight loss  [ ]poor nutritional intake  [ ]anasarca[ ]Artificial Nutrition      Other REFERRALS:  [ ]Hospice  [ ]Child Life  [ ]Social Work  [ ]Case management [ ]Holistic Therapy     Goals of Care Document:  Date of [id number] @ 17:07  HPI:  This is a 68M with history of end stage COPD on 4L NC, GABY (incompletely treated), HTN, HLD, Glaucoma, and GERD who presents to the hospital from his pulmonologist's office for hypoxia to <80s. Patient states that he feels well currently, denies any worsening of his chronic respiratory issues (chronic cough and sputum unchanged). States that he feels SOB with ambulation but improves with rest. Denies chest pain, palpitations, or LOC with ambulation or with rest. Reports orthopnea but denies LE edema. As per daughter, the patient's O2 sats have gone down to 60s-70s% with ambulation, the family have increased his O2 delivery to 4.5L to help with the desaturations. No other acute complaints. As per daughter, patient is essentially home bound and his ambulatory status is limited to short exertion from room to room.     On arrival to the ED his vitals were T 98, P 69, /88, RR 18, O2 sat 99% RA. His lab work did not show significant abnormalities. His VBG and his RVP were without acute findings. His CTA Chest was negative for a PE and showed secretions in the trachea/rubina and bilateral emphysematous changes of the lungs. He was given lasix 20mg IVP x1 and was admitted to medicine on telemetry.     Of note, on the GMF, patient was noted to desaturate to 70s while on 6L NC when ambulating to the bathroom.  (15 May 2024 23:45)    Palliative consulted for complex medical decision making and symptom management in the setting of serious illness.  Patient seen this afternoon at bedside. They are accompanied by their daughter   Spoke to patient in shared common language of English  Pt has dyspnea with minimal exertion, has drop in oxygen without becoming symptomatic other times  Denies any pain or anxiety; endorses insomnia has constipation which is managed with dulcolax.   GOC as documented separately   PERTINENT PM/SXH:   HTN (hypertension)    DM (diabetes mellitus)    TIA (transient ischemic attack)    Anxiety    Bruxism    Benign prostatic hypertrophy    TIA (transient ischemic attack)    Cigarette smoker    Chronic obstructive pulmonary disease (COPD)    HLD (hyperlipidemia)    Glaucoma    History of GABY infection      No significant past surgical history    History of biopsy of bladder    S/P right cataract extraction      FAMILY HISTORY:  No pertinent family history in first degree relatives      Family Hx substance abuse [ ]yes [ ]no    ITEMS NOT CHECKED ARE NOT PRESENT    SOCIAL HISTORY:   Significant other/partner[x ]  Children[ x]  Gnosticism/Spirituality: Amish   Substance hx:  [ ]   Tobacco hx:  [ ]   Alcohol hx: [ ]   Home Opioid hx:  [x ] I-Stop Reference No: [id number]  Living Situation: [x ]Home  [ ]Long term care  [ ]Rehab [ ]Other    ADVANCE DIRECTIVES:    DNR/MOLST  [ ]  Living Will  [ ]   DECISION MAKER(s):  [ ] Health Care Proxy(s)  [x ] Surrogate(s)  [ ] Guardian           Name(s): Phone Number(s): Stephania Mercedes (daughter)     BASELINE (I)ADL(s) (prior to admission):  [place]: [ ]Total  [ ] Moderate [x ]Dependent    Allergies    No Known Allergies    Intolerances    MEDICATIONS  (STANDING):  atorvastatin 10 milliGRAM(s) Oral at bedtime  buDESOnide   90 MICROgram(s) Inhaler 2 Puff(s) Inhalation two times a day  dorzolamide 2%/timolol 0.5% Ophthalmic Solution 1 Drop(s) Both EYES two times a day  famotidine    Tablet 20 milliGRAM(s) Oral daily  gabapentin 300 milliGRAM(s) Oral three times a day  glycopyrrolate 9 MICROgram(s)/formoterol 4.8 MICROgram(s) Inhaler 2 Puff(s) Inhalation two times a day  latanoprost 0.005% Ophthalmic Solution 1 Drop(s) Both EYES at bedtime  losartan 50 milliGRAM(s) Oral daily  oxybutynin 5 milliGRAM(s) Oral two times a day  tamsulosin 0.4 milliGRAM(s) Oral at bedtime  traZODone 100 milliGRAM(s) Oral at bedtime    MEDICATIONS  (PRN):  acetaminophen     Tablet .. 650 milliGRAM(s) Oral every 6 hours PRN Temp greater or equal to 38C (100.4F), Mild Pain (1 - 3)  albuterol    90 MICROgram(s) HFA Inhaler 1 Puff(s) Inhalation every 6 hours PRN Shortness of Breath and/or Wheezing  aluminum hydroxide/magnesium hydroxide/simethicone Suspension 30 milliLiter(s) Oral every 4 hours PRN Dyspepsia  bisacodyl 5 milliGRAM(s) Oral every 12 hours PRN Constipation  melatonin 3 milliGRAM(s) Oral at bedtime PRN Insomnia  ondansetron Injectable 4 milliGRAM(s) IV Push every 8 hours PRN Nausea and/or Vomiting    PRESENT SYMPTOMS: [ ]Unable to self-report  [ ] CPOT [ ] PAINADs [ ] RDOS  Source if other than patient:  [ ]Family   [ ]Team     Pain: [ ]yes [x ]no  QOL impact -   Location -                    Aggravating factors -  Quality -  Radiation -  Timing-  Severity (0-10 scale):  Minimal acceptable level/pain goal (0-10 scale):     CPOT:    https://www.[facility].org/getattachment/[email or web address]/Critical-Care-Pain-Observation-Tool-(CPOT)    PAIN AD Score:   http://geriatrictoolkit.[facility]/cog/painad.pdf (press ctrl +  left click to view)    Dyspnea:                           [ ]Mild [x ]Moderate [ ]Severe    RDOS:  0 to 2  minimal or no respiratory distress   3  mild distress  4 to 6 moderate distress  >7 severe distress  https://[email or web address]/handouts/hen/Respiratory_Distress_Observation_Scale.pdf (Ctrl +  left click to view)     Anxiety:                             [ ]Mild [ ]Moderate [ ]Severe  Fatigue:                             [ ]Mild [ ]Moderate [ ]Severe  Nausea:                             [ ]Mild [ ]Moderate [ ]Severe  Loss of appetite:              [ ]Mild [ ]Moderate [ ]Severe  Constipation:                    [x ]Mild [ ]Moderate [ ]Severe    PCSSQ[Palliative Care Spiritual Screening Question]   Severity (0-10): deferred  Score of 4 or > indicate consideration of Chaplaincy referral.  Chaplaincy Referral: [ ] yes [ ] refused [ ] following [ ] Deferred     Caregiver Drake? : [ ] yes [ ] no [ ] Deferred [ ] Declined             Social work referral [ ] Patient & Family Centered Care Referral [ ]     Anticipatory Grief present?:  [ ] yes [ ] no  [ ] Deferred                  Social work referral [ ] Chaplaincy Referral[ ]    Other Symptoms:  [ x]All other review of systems negative       Palliative Performance Status Version 2:         %    http://npcrc.org/files/news/palliative_performance_scale_ppsv2.pdf    PHYSICAL EXAM:  Vital Signs Last 24 Hrs  T(C): 36.9 (16 May 2024 00:05), Max: 36.9 (16 May 2024 00:05)  T(F): 98.4 (16 May 2024 00:05), Max: 98.4 (16 May 2024 00:05)  HR: 86 (16 May 2024 07:15) (70 - 88)  BP: 158/82 (16 May 2024 07:15) (134/98 - 158/82)  BP(mean): --  RR: 18 (16 May 2024 07:15) (16 - 20)  SpO2: 83% (16 May 2024 07:15) (72% - 97%)    Parameters below as of 16 May 2024 07:15  Patient On (Oxygen Delivery Method): nasal cannula  O2 Flow (L/min): 6   I&O's Summary    GENERAL: [ ]Cachexia    [ x]Alert  [ x]Oriented x 3  [ ]Lethargic  [ ]Unarousable  [x ]Verbal  [ ]Non-Verbal  Behavioral:   [ ] Anxiety  [ ] Delirium [ ] Agitation [ ] Other  HEENT:  [ ]Normal   [x ]Dry mouth   [ ]ET Tube/Trach  [ ]Oral lesions  PULMONARY:   [ ]Clear [ x]Tachypnea  [ ]Audible excessive secretions   [x ]Rhonchi        [ ]Right [ ]Left [ ]Bilateral  [ ]Crackles        [ ]Right [ ]Left [ ]Bilateral  [ ]Wheezing     [ ]Right [ ]Left [ ]Bilateral  [ ]Diminished breath sounds [ ]right [ ]left [ ]bilateral  CARDIOVASCULAR:    [x ]Regular [ ]Irregular [ ]Tachy  [ ]Armani [ ]Murmur [ ]Other  GASTROINTESTINAL:  [x ]Soft  [ ]Distended   [ ]+BS  [x ]Non tender [ ]Tender  [ ]Other [ ]PEG [ ]OGT/ NGT  Last BM:  GENITOURINARY:  [ x]Normal [ ] Incontinent   [ ]Oliguria/Anuria   [ ]Baker  MUSCULOSKELETAL:   [ ]Normal   [ x]Weakness  [ ]Bed/Wheelchair bound [ ]Edema  NEUROLOGIC:   [x ]No focal deficits  [ ]Cognitive impairment  [ ]Dysphagia [ ]Dysarthria [ ]Paresis [ ]Other   SKIN:   [ ]Normal  [ ]Rash  [ ]Other  [ ]Pressure ulcer(s)       Present on admission [ ]y [ ]n      CRITICAL CARE:  [ ] Shock Present  [ ]Septic [ ]Cardiogenic [ ]Neurologic [ ]Hypovolemic  [ ]  Vasopressors [ ]  Inotropes   [x ]Respiratory failure present [ ]Mechanical ventilation [ ]Non-invasive ventilatory support [ ]High flow    [x ]Acute  [x ]Chronic [x ]Hypoxic  [ ]Hypercarbic [ ]Other  [ ]Other organ failure     LABS:                        15.5   8.85  )-----------( 174      ( 16 May 2024 06:47 )             45.2   05-16    135  |  100  |  12  ----------------------------<  202<H>  3.4<L>   |  21<L>  |  0.88    Ca    9.1      16 May 2024 06:57  Phos  3.0     05-16  Mg     2.20     05-16    TPro  7.4  /  Alb  4.0  /  TBili  0.4  /  DBili  x   /  AST  19  /  ALT  15  /  AlkPhos  107  05-16  PT/INR - ( 15 May 2024 17:46 )   PT: 12.3 sec;   INR: 1.10 ratio         PTT - ( 15 May 2024 17:46 )  PTT:32.3 sec    Urinalysis Basic - ( 16 May 2024 06:57 )    Color: x / Appearance: x / SG: x / pH: x  Gluc: 202 mg/dL / Ketone: x  / Bili: x / Urobili: x   Blood: x / Protein: x / Nitrite: x   Leuk Esterase: x / RBC: x / WBC x   Sq Epi: x / Non Sq Epi: x / Bacteria: x      RADIOLOGY & ADDITIONAL STUDIES:  < from: CT Angio Chest PE Protocol w/ IV Cont (05.15.24 @ 20:03) >  FINDINGS:    LUNGS AND AIRWAYS: There are secretions within the trachea and rubina.   Emphysematous changes in bilateral lungs. Right lower calcified granuloma.  PLEURA: No pleural effusion.  MEDIASTINUM AND RICK: A few lymph nodes are present in the pretracheal   space, AP window and the subcarinal region. They are unchanged when   compared to previous exam.  VESSELS: No main, lobar, segmental or subsegmental pulmonary embolism.  HEART: Heart size is normal. No pericardial effusion.  CHEST WALL AND LOWER NECK: Bilateral gynecomastia.  VISUALIZED UPPER ABDOMEN: Within normal limits.  BONES: Degenerative changes.    IMPRESSION:  1.  No pulmonary embolism.      < end of copied text >    PROTEIN CALORIE MALNUTRITION PRESENT: [ ]mild [ ]moderate [ ]severe [ ]underweight [ ]morbid obesity  https://www.andeal.org/vault/2440/web/files/ONC/Table_Clinical%20Characteristics%20to%20Document%20Malnutrition-White%20JV%20et%20al%202012.pdf    Height (cm): 162.6 (05-16-24 @ 02:10)  Weight (kg): 52.2 (05-16-24 @ 02:10)  BMI (kg/m2): 19.7 (05-16-24 @ 02:10)    [ ]PPSV2 < or = to 30% [ ]significant weight loss  [ ]poor nutritional intake  [ ]anasarca[ ]Artificial Nutrition      Other REFERRALS:  [ ]Hospice  [ ]Child Life  [ ]Social Work  [ ]Case management [ ]Holistic Therapy     Goals of Care Document:

## 2024-05-16 NOTE — CONSULT NOTE ADULT - PROBLEM SELECTOR RECOMMENDATION 4
Please see separate goc documented from today: in summary pt defers decisions to his daughter Stephania Mercedes who understands that patient has end stage COPD for which there's not further advance treatment options available and for whom hospice has been recommended. In discussing hospice, she's amenable to home hospice referral. Also addressed code status, and daughter shared her father has always expressed wanting to die at home and in discussed ACP, pt and daughter agreed to DNR/DNI, trial of non-invasives.     MOLST completed today 5/16 and placed in chart: DNR/DNI, trial of noninvasive, no feeding tube, no HD, abx use as determined, No ICU or pressors    PLAN: CM to make home hospice referral; family wish to take him home as soon as possible as he has had issues with hospital induced delirium in the past.    42 mins spent on acp

## 2024-05-16 NOTE — CONSULT NOTE ADULT - ASSESSMENT
68M chronic RV failure, COPD w/ emphysema, chronic hypoxemic resp failure, mMRC 4 coming if for chronic progressive hypoxemia    COPD on 4-5L with progressive dyspnea on exertions, exertional hypoxemia  Emphysema  Chronic progressive hypoxemia  Poor functional status; functionally homebound  Former smoker    Do not suspect acute exacerbation but progression of chronic disease  CTA no PE; extensive emphysema  PFTs outpatient in 2023 were very poor effort limited by dyspnea but had at least moderate obstruction    - consult palliative care - d/w with pt and daughter in office 5/15 about end-stage disease and poor functional status that hospice is appropriate and she states that he and she will have deepak discussions about end of life care and goals; need to coordinate family meeting (pt states daughter coming in today so we can plan)  - check echo w/ bubble, PASP estimation  - continue Symbicort and Spiriva    prelim recs

## 2024-05-16 NOTE — CONSULT NOTE ADULT - SUBJECTIVE AND OBJECTIVE BOX
PATIENT:  MD BELL  [id number]    CHIEF COMPLAINT:  Patient is a 68y old  Male who presents with a chief complaint of Hypoxia at MD office (15 May 2024 23:45)    INTERVAL HISTORY/OVERNIGHT EVENTS:      MEDICATIONS:  MEDICATIONS  (STANDING):  atorvastatin 10 milliGRAM(s) Oral at bedtime  buDESOnide   90 MICROgram(s) Inhaler 2 Puff(s) Inhalation two times a day  dorzolamide 2%/timolol 0.5% Ophthalmic Solution 1 Drop(s) Both EYES two times a day  famotidine    Tablet 20 milliGRAM(s) Oral daily  gabapentin 300 milliGRAM(s) Oral three times a day  glycopyrrolate 9 MICROgram(s)/formoterol 4.8 MICROgram(s) Inhaler 2 Puff(s) Inhalation two times a day  latanoprost 0.005% Ophthalmic Solution 1 Drop(s) Both EYES at bedtime  losartan 50 milliGRAM(s) Oral daily  oxybutynin 5 milliGRAM(s) Oral two times a day  potassium chloride   Powder 40 milliEquivalent(s) Oral once  tamsulosin 0.4 milliGRAM(s) Oral at bedtime  traZODone 100 milliGRAM(s) Oral at bedtime    MEDICATIONS  (PRN):  acetaminophen     Tablet .. 650 milliGRAM(s) Oral every 6 hours PRN Temp greater or equal to 38C (100.4F), Mild Pain (1 - 3)  albuterol    90 MICROgram(s) HFA Inhaler 1 Puff(s) Inhalation every 6 hours PRN Shortness of Breath and/or Wheezing  aluminum hydroxide/magnesium hydroxide/simethicone Suspension 30 milliLiter(s) Oral every 4 hours PRN Dyspepsia  bisacodyl 5 milliGRAM(s) Oral every 12 hours PRN Constipation  melatonin 3 milliGRAM(s) Oral at bedtime PRN Insomnia  ondansetron Injectable 4 milliGRAM(s) IV Push every 8 hours PRN Nausea and/or Vomiting      ALLERGIES:  Allergies  No Known Allergies    OBJECTIVE:  ICU Vital Signs Last 24 Hrs  T(C): 36.9 (16 May 2024 00:05), Max: 36.9 (16 May 2024 00:05)  T(F): 98.4 (16 May 2024 00:05), Max: 98.4 (16 May 2024 00:05)  HR: 86 (16 May 2024 07:15) (69 - 88)  BP: 158/82 (16 May 2024 07:15) (134/98 - 167/88)  RR: 18 (16 May 2024 07:15) (16 - 20)  SpO2: 83% (16 May 2024 07:15) (72% - 99%)    O2 Parameters below as of 16 May 2024 07:15  Patient On (Oxygen Delivery Method): nasal cannula  O2 Flow (L/min): 6    PHYSICAL EXAMINATION:  General: WN/WD NAD  HEENT: EOMI, moist mucous membranes  Neurology: A&Ox3, nonfocal, CASH x 4  Respiratory: distant breath sounds  CV: RRR, S1S2, no murmurs, rubs or gallops  Abdominal: Soft, NT, ND +BS  Extremities: very thin extremities    LABS:                          15.5   8.85  )-----------( 174      ( 16 May 2024 06:47 )             45.2     05-16    135  |  100  |  12  ----------------------------<  202<H>  3.4<L>   |  21<L>  |  0.88    Ca    9.1      16 May 2024 06:57  Phos  3.0     05-16  Mg     2.20     05-16    TPro  7.4  /  Alb  4.0  /  TBili  0.4  /  DBili  x   /  AST  19  /  ALT  15  /  AlkPhos  107  05-16    LIVER FUNCTIONS - ( 16 May 2024 06:57 )  Alb: 4.0 g/dL / Pro: 7.4 g/dL / ALK PHOS: 107 U/L / ALT: 15 U/L / AST: 19 U/L / GGT: x           PT/INR - ( 15 May 2024 17:46 )   PT: 12.3 sec;   INR: 1.10 ratio    PTT - ( 15 May 2024 17:46 )  PTT:32.3 sec        Urinalysis Basic - ( 16 May 2024 06:57 )    Color: x / Appearance: x / SG: x / pH: x  Gluc: 202 mg/dL / Ketone: x  / Bili: x / Urobili: x   Blood: x / Protein: x / Nitrite: x   Leuk Esterase: x / RBC: x / WBC x   Sq Epi: x / Non Sq Epi: x / Bacteria: x PATIENT:  MD BELL  [id number]    CHIEF COMPLAINT:  Patient is a 68y old  Male who presents with a chief complaint of Hypoxia at MD office (15 May 2024 23:45)    INTERVAL HISTORY/OVERNIGHT EVENTS:  68M chronic RV failure, COPD w/ emphysema, chronic hypoxemic resp failure, mMRC 4 coming if for chronic progressive hypoxemia    COPD on 4-5L with progressive dyspnea on exertions, exertional hypoxemia  Emphysema  Chronic progressive hypoxemia  Poor functional status; functionally homebound  Former smoker    No fevers, chills, cough at home  Desats to 70s when ambulating for last couple of months getting worse  Dyspneic when going from cough to bed, worsening  Memory issues, functional status declining, needs help with ADLs  Rarely leaves the house  Appetite poorer      MEDICATIONS:  MEDICATIONS  (STANDING):  atorvastatin 10 milliGRAM(s) Oral at bedtime  buDESOnide   90 MICROgram(s) Inhaler 2 Puff(s) Inhalation two times a day  dorzolamide 2%/timolol 0.5% Ophthalmic Solution 1 Drop(s) Both EYES two times a day  famotidine    Tablet 20 milliGRAM(s) Oral daily  gabapentin 300 milliGRAM(s) Oral three times a day  glycopyrrolate 9 MICROgram(s)/formoterol 4.8 MICROgram(s) Inhaler 2 Puff(s) Inhalation two times a day  latanoprost 0.005% Ophthalmic Solution 1 Drop(s) Both EYES at bedtime  losartan 50 milliGRAM(s) Oral daily  oxybutynin 5 milliGRAM(s) Oral two times a day  potassium chloride   Powder 40 milliEquivalent(s) Oral once  tamsulosin 0.4 milliGRAM(s) Oral at bedtime  traZODone 100 milliGRAM(s) Oral at bedtime    MEDICATIONS  (PRN):  acetaminophen     Tablet .. 650 milliGRAM(s) Oral every 6 hours PRN Temp greater or equal to 38C (100.4F), Mild Pain (1 - 3)  albuterol    90 MICROgram(s) HFA Inhaler 1 Puff(s) Inhalation every 6 hours PRN Shortness of Breath and/or Wheezing  aluminum hydroxide/magnesium hydroxide/simethicone Suspension 30 milliLiter(s) Oral every 4 hours PRN Dyspepsia  bisacodyl 5 milliGRAM(s) Oral every 12 hours PRN Constipation  melatonin 3 milliGRAM(s) Oral at bedtime PRN Insomnia  ondansetron Injectable 4 milliGRAM(s) IV Push every 8 hours PRN Nausea and/or Vomiting      ALLERGIES:  Allergies  No Known Allergies    OBJECTIVE:  ICU Vital Signs Last 24 Hrs  T(C): 36.9 (16 May 2024 00:05), Max: 36.9 (16 May 2024 00:05)  T(F): 98.4 (16 May 2024 00:05), Max: 98.4 (16 May 2024 00:05)  HR: 86 (16 May 2024 07:15) (69 - 88)  BP: 158/82 (16 May 2024 07:15) (134/98 - 167/88)  RR: 18 (16 May 2024 07:15) (16 - 20)  SpO2: 83% (16 May 2024 07:15) (72% - 99%)    O2 Parameters below as of 16 May 2024 07:15  Patient On (Oxygen Delivery Method): nasal cannula  O2 Flow (L/min): 6    PHYSICAL EXAMINATION:  General: WN/WD NAD  HEENT: EOMI, moist mucous membranes  Neurology: A&Ox3, nonfocal, CASH x 4  Respiratory: distant breath sounds  CV: RRR, S1S2, no murmurs, rubs or gallops  Abdominal: Soft, NT, ND +BS  Extremities: very thin extremities    LABS:                          15.5   8.85  )-----------( 174      ( 16 May 2024 06:47 )             45.2     05-16    135  |  100  |  12  ----------------------------<  202<H>  3.4<L>   |  21<L>  |  0.88    Ca    9.1      16 May 2024 06:57  Phos  3.0     05-16  Mg     2.20     05-16    TPro  7.4  /  Alb  4.0  /  TBili  0.4  /  DBili  x   /  AST  19  /  ALT  15  /  AlkPhos  107  05-16    LIVER FUNCTIONS - ( 16 May 2024 06:57 )  Alb: 4.0 g/dL / Pro: 7.4 g/dL / ALK PHOS: 107 U/L / ALT: 15 U/L / AST: 19 U/L / GGT: x           PT/INR - ( 15 May 2024 17:46 )   PT: 12.3 sec;   INR: 1.10 ratio    PTT - ( 15 May 2024 17:46 )  PTT:32.3 sec        Urinalysis Basic - ( 16 May 2024 06:57 )    Color: x / Appearance: x / SG: x / pH: x  Gluc: 202 mg/dL / Ketone: x  / Bili: x / Urobili: x   Blood: x / Protein: x / Nitrite: x   Leuk Esterase: x / RBC: x / WBC x   Sq Epi: x / Non Sq Epi: x / Bacteria: x

## 2024-05-16 NOTE — CONSULT NOTE ADULT - PROBLEM SELECTOR RECOMMENDATION 6
Thank you for allowing us to participate in your patient's care. We will continue to follow with you. Please page [phone] for any q's or c's. The Geriatric and Palliative Medicine service has coverage 24 hours a day/ 7 days a week to provide medical recommendations regarding symptom management needs via telephone.    Aria Cummins MD  Palliative Medicine

## 2024-05-16 NOTE — CONSULT NOTE ADULT - ATTENDING COMMENTS
End stage COPD with hypoxemic respiratory failure.  Continue triple inhaler therapy (ICS/LABA/LAMA).   Agree with palliative care consultation and evaluation for home hospice.

## 2024-05-16 NOTE — PROGRESS NOTE ADULT - SUBJECTIVE AND OBJECTIVE BOX
[facility] Division of Hospital Medicine  David Lawrence MD  Pager (ESTRELLA, 5W-5P): [phone]  Other Times:  [phone]    Patient is a 68y old  Male who presents with a chief complaint of Hypoxia at MD office (16 May 2024 09:38)    SUBJECTIVE / OVERNIGHT EVENTS:  Evaluated with pulmonary team by the bedside.  Patient had multiple episodes of hypoxia during the hospital stay despite O2 support - however no LOC. Baseline dypsnea/SOB present. No F/C, N/V, CP, Cough, lightheadedness, dizziness, abdominal pain, diarrhea, dysuria.    MEDICATIONS  (STANDING):  atorvastatin 10 milliGRAM(s) Oral at bedtime  buDESOnide   90 MICROgram(s) Inhaler 2 Puff(s) Inhalation two times a day  dorzolamide 2%/timolol 0.5% Ophthalmic Solution 1 Drop(s) Both EYES two times a day  famotidine    Tablet 20 milliGRAM(s) Oral daily  gabapentin 300 milliGRAM(s) Oral three times a day  glycopyrrolate 9 MICROgram(s)/formoterol 4.8 MICROgram(s) Inhaler 2 Puff(s) Inhalation two times a day  latanoprost 0.005% Ophthalmic Solution 1 Drop(s) Both EYES at bedtime  losartan 50 milliGRAM(s) Oral daily  oxybutynin 5 milliGRAM(s) Oral two times a day  tamsulosin 0.4 milliGRAM(s) Oral at bedtime  traZODone 100 milliGRAM(s) Oral at bedtime    MEDICATIONS  (PRN):  acetaminophen     Tablet .. 650 milliGRAM(s) Oral every 6 hours PRN Temp greater or equal to 38C (100.4F), Mild Pain (1 - 3)  albuterol    90 MICROgram(s) HFA Inhaler 1 Puff(s) Inhalation every 6 hours PRN Shortness of Breath and/or Wheezing  aluminum hydroxide/magnesium hydroxide/simethicone Suspension 30 milliLiter(s) Oral every 4 hours PRN Dyspepsia  bisacodyl 5 milliGRAM(s) Oral every 12 hours PRN Constipation  melatonin 3 milliGRAM(s) Oral at bedtime PRN Insomnia  ondansetron Injectable 4 milliGRAM(s) IV Push every 8 hours PRN Nausea and/or Vomiting      Vital Signs Last 24 Hrs  T(C): 36.9 (16 May 2024 00:05), Max: 36.9 (16 May 2024 00:05)  T(F): 98.4 (16 May 2024 00:05), Max: 98.4 (16 May 2024 00:05)  HR: 86 (16 May 2024 07:15) (69 - 88)  BP: 158/82 (16 May 2024 07:15) (134/98 - 167/88)  BP(mean): --  RR: 18 (16 May 2024 07:15) (16 - 20)  SpO2: 83% (16 May 2024 07:15) (72% - 99%)    Parameters below as of 16 May 2024 07:15  Patient On (Oxygen Delivery Method): nasal cannula  O2 Flow (L/min): 6    CAPILLARY BLOOD GLUCOSE        I&O's Summary      PHYSICAL EXAM:  CONSTITUTIONAL: NAD  EYES: PERRLA; conjunctiva and sclera clear  ENMT: Moist oral mucosa, no pharyngeal injection or exudates; normal dentition  NECK: Supple, no palpable masses; no thyromegaly  RESPIRATORY: Poor respiratory effort; quiet BS overall  CARDIOVASCULAR: Regular rate and rhythm, normal S1 and S2, no murmur/rub/gallop; No lower extremity edema; Peripheral pulses are 2+ bilaterally  ABDOMEN: Nontender to palpation, normoactive bowel sounds, no rebound/guarding; No hepatosplenomegaly  MUSCULOSKELETAL:  Did not assess gait; no clubbing or cyanosis of digits; no joint swelling or tenderness to palpation  PSYCH: A+O to person, place, and time; affect appropriate  NEUROLOGY: CN 2-12 are intact and symmetric; no gross sensory deficits   SKIN: No rashes; no palpable lesions    LABS:                        15.5   8.85  )-----------( 174      ( 16 May 2024 06:47 )             45.2     05-16    135  |  100  |  12  ----------------------------<  202<H>  3.4<L>   |  21<L>  |  0.88    Ca    9.1      16 May 2024 06:57  Phos  3.0     05-16  Mg     2.20     05-16    TPro  7.4  /  Alb  4.0  /  TBili  0.4  /  DBili  x   /  AST  19  /  ALT  15  /  AlkPhos  107  05-16    PT/INR - ( 15 May 2024 17:46 )   PT: 12.3 sec;   INR: 1.10 ratio         PTT - ( 15 May 2024 17:46 )  PTT:32.3 sec      Urinalysis Basic - ( 16 May 2024 06:57 )    Color: x / Appearance: x / SG: x / pH: x  Gluc: 202 mg/dL / Ketone: x  / Bili: x / Urobili: x   Blood: x / Protein: x / Nitrite: x   Leuk Esterase: x / RBC: x / WBC x   Sq Epi: x / Non Sq Epi: x / Bacteria: x        RADIOLOGY & ADDITIONAL TESTS:    Imaging Personally Reviewed:    Care Discussed with Consultants/Other Providers:   [facility] Division of Hospital Medicine  David Lawrence MD  Pager (ESTRELLA, 4M-5P): [phone]  Other Times:  [phone]    Patient is a 68y old  Male who presents with a chief complaint of Hypoxia at MD office (16 May 2024 09:38)    SUBJECTIVE / OVERNIGHT EVENTS:  Evaluated with pulmonary team by the bedside.  Patient had multiple episodes of hypoxia during the hospital stay despite O2 support - however no LOC. Baseline dypsnea/SOB present. No F/C, N/V, CP, Cough, lightheadedness, dizziness, abdominal pain, diarrhea, dysuria.    MEDICATIONS  (STANDING):  atorvastatin 10 milliGRAM(s) Oral at bedtime  buDESOnide   90 MICROgram(s) Inhaler 2 Puff(s) Inhalation two times a day  dorzolamide 2%/timolol 0.5% Ophthalmic Solution 1 Drop(s) Both EYES two times a day  famotidine    Tablet 20 milliGRAM(s) Oral daily  gabapentin 300 milliGRAM(s) Oral three times a day  glycopyrrolate 9 MICROgram(s)/formoterol 4.8 MICROgram(s) Inhaler 2 Puff(s) Inhalation two times a day  latanoprost 0.005% Ophthalmic Solution 1 Drop(s) Both EYES at bedtime  losartan 50 milliGRAM(s) Oral daily  oxybutynin 5 milliGRAM(s) Oral two times a day  tamsulosin 0.4 milliGRAM(s) Oral at bedtime  traZODone 100 milliGRAM(s) Oral at bedtime    MEDICATIONS  (PRN):  acetaminophen     Tablet .. 650 milliGRAM(s) Oral every 6 hours PRN Temp greater or equal to 38C (100.4F), Mild Pain (1 - 3)  albuterol    90 MICROgram(s) HFA Inhaler 1 Puff(s) Inhalation every 6 hours PRN Shortness of Breath and/or Wheezing  aluminum hydroxide/magnesium hydroxide/simethicone Suspension 30 milliLiter(s) Oral every 4 hours PRN Dyspepsia  bisacodyl 5 milliGRAM(s) Oral every 12 hours PRN Constipation  melatonin 3 milliGRAM(s) Oral at bedtime PRN Insomnia  ondansetron Injectable 4 milliGRAM(s) IV Push every 8 hours PRN Nausea and/or Vomiting      Vital Signs Last 24 Hrs  T(C): 36.9 (16 May 2024 00:05), Max: 36.9 (16 May 2024 00:05)  T(F): 98.4 (16 May 2024 00:05), Max: 98.4 (16 May 2024 00:05)  HR: 86 (16 May 2024 07:15) (69 - 88)  BP: 158/82 (16 May 2024 07:15) (134/98 - 167/88)  BP(mean): --  RR: 18 (16 May 2024 07:15) (16 - 20)  SpO2: 83% (16 May 2024 07:15) (72% - 99%)    Parameters below as of 16 May 2024 07:15  Patient On (Oxygen Delivery Method): nasal cannula  O2 Flow (L/min): 6    CAPILLARY BLOOD GLUCOSE        I&O's Summary      PHYSICAL EXAM:  CONSTITUTIONAL: NAD  EYES: PERRLA; conjunctiva and sclera clear  ENMT: Moist oral mucosa, no pharyngeal injection or exudates; normal dentition  NECK: Supple, no palpable masses; no thyromegaly  RESPIRATORY: Poor respiratory effort; quiet BS overall  CARDIOVASCULAR: Regular rate and rhythm, normal S1 and S2, no murmur/rub/gallop; No lower extremity edema; Peripheral pulses are 2+ bilaterally  ABDOMEN: Nontender to palpation, normoactive bowel sounds, no rebound/guarding; No hepatosplenomegaly  MUSCULOSKELETAL:  Did not assess gait; no clubbing or cyanosis of digits; no joint swelling or tenderness to palpation  PSYCH: A+O to person, place, and time; affect appropriate  NEUROLOGY: CN 2-12 are intact and symmetric; no gross sensory deficits   SKIN: No rashes; no palpable lesions    LABS:                        15.5   8.85  )-----------( 174      ( 16 May 2024 06:47 )             45.2     05-16    135  |  100  |  12  ----------------------------<  202<H>  3.4<L>   |  21<L>  |  0.88    Ca    9.1      16 May 2024 06:57  Phos  3.0     05-16  Mg     2.20     05-16    TPro  7.4  /  Alb  4.0  /  TBili  0.4  /  DBili  x   /  AST  19  /  ALT  15  /  AlkPhos  107  05-16    PT/INR - ( 15 May 2024 17:46 )   PT: 12.3 sec;   INR: 1.10 ratio         PTT - ( 15 May 2024 17:46 )  PTT:32.3 sec      Urinalysis Basic - ( 16 May 2024 06:57 )    Color: x / Appearance: x / SG: x / pH: x  Gluc: 202 mg/dL / Ketone: x  / Bili: x / Urobili: x   Blood: x / Protein: x / Nitrite: x   Leuk Esterase: x / RBC: x / WBC x   Sq Epi: x / Non Sq Epi: x / Bacteria: x        RADIOLOGY & ADDITIONAL TESTS:    Imaging Personally Reviewed:    Care Discussed with Consultants/Other Providers: Dr. Mayfield - Pulmonary - COPD management

## 2024-05-16 NOTE — CONSULT NOTE ADULT - ASSESSMENT
68M with history as above who presents to the hospital with progressively worsening hypoxia at home here noted to desaturate to 70s% on 6L NC with minimal exertion concerning for progressively worsening chronic respiratory failure.    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  Palliative consulted for complex medical decision making and symptom management in the setting of serious illness.

## 2024-05-16 NOTE — CONSULT NOTE ADULT - TIME BILLING
Time spent for extensive review of the physical chart, electronic medical record, and documentation to obtain collateral information including but not limited to:  [x ] Inpatient records (ED, H&P, primary team, and consultants if applicable, care coordination)  [x] Inpatient values/results (biomarkers, immunoassays, imaging, and microbiology results)  [x] Current or proposed treatment plans  [x] Discussion with the primary team  [x] Discussion with the patient, surrogate decision maker, or family    Time spent: >75 mins (separate from acp)

## 2024-05-16 NOTE — PATIENT PROFILE ADULT - FALL HARM RISK - HARM RISK INTERVENTIONS

## 2024-05-16 NOTE — PROVIDER CONTACT NOTE (OTHER) - BACKGROUND
Pt desat to 70% with activity and upon rest while on 6LPM NC
Pt was in the bathroom ambulating as well as moderate exertion. Hx of end stage COPD

## 2024-05-17 NOTE — ED ADULT NURSE NOTE - OBJECTIVE STATEMENT
Yi speaking Pt received from triage on stretcher w/ daughter at the bedside, A/Ox4 answers all questions appropriately. C/O SOB, recent discharged from our facility today, was initially admitted for Hypoxia. Hx of COPD, @home O2 6LNC. Pt initially received to room#5 on 6LNC, O2 sat @ 84%, Per ED Provider Pt placed on [id number] w/ improvement in O2 sat to 98%. Pt denies chest pain during initial assessment. Abdomen is soft and non-distended, non-tender to touch. Pt ambulates independently at baseline, moves all four extremities on command, skin is intact. Pt resting comfortably at the bedside, no visible apparent acute distress noted on initial assessment. Vital signs stable, NKA to medications. PMHx HTN COPD. Pt placed on continuous tele monitoring, NSR observed on the monitor. Pending provider orders.

## 2024-05-17 NOTE — DISCHARGE NOTE PROVIDER - HOSPITAL COURSE
67 y/o male with PMHx of COPD-4.5L O2, HTN, BPH and glaucoma who presented with progression to end-stage COPD w/ acute hypoxia, with course c/b elevated A1c.      1. COPD: Per pulmonary, patient is determined to be end-stage COPD. Seen by palliative care for discussion about hospice and GOC. Family wishes for DNR and trial of NIV - DNI. Amenable to home hospice referral, continue pulmicort and oxygen support. No indication for HFNC or BiPAP as per pulmonary.    2. DM2:     Medically cleared/stable for discharge as per Dr. Lawrence to home hospice. 69 y/o male with PMHx of COPD-4.5L O2, HTN, BPH and glaucoma who presented with progression to end-stage COPD w/ acute hypoxia, with course c/b elevated A1c.      1. COPD: Per pulmonary, patient is determined to be end-stage COPD. Seen by palliative care for discussion about hospice and GOC. Family wishes for DNR and trial of NIV - DNI. Amenable to home hospice referral, continue pulmicort and oxygen support. No indication for HFNC or BiPAP as per pulmonary.    2. DM2: Liberalize diet, no role for starting medications at this time given home hospice.     Medically cleared/stable for discharge as per Dr. Lawrence to home hospice. 67 y/o male with PMHx of COPD-4.5L O2, HTN, BPH and glaucoma who presented with progression to end-stage COPD w/ acute hypoxia, with course c/b elevated A1c.      1. COPD: Per pulmonary, patient is determined to be end-stage COPD. Seen by palliative care for discussion about hospice and GOC. Family wishes for DNR and trial of NIV - DNI. Amenable to home hospice referral, continue pulmicort and oxygen support. No indication for HFNC or BiPAP as per pulmonary.    2. DM2: Liberalize diet, no role for starting medications at this time given home hospice.     Medically cleared/stable for discharge as per Dr. Lawrence to home hospice.    68M COPD-4.5L O2, HTN, BPH, glaucoma p/w Progression to end-stage COPD w/ acute hypoxia, elevated A1c.       Problem/Plan - 1:  ·  Problem: End stage COPD.   ·  Plan: appreciate pulmonary team consult  - unfortunately, patient is determined to be end-stage COPD  - appreciate Palliative care consult for discussion about hospice and GOC  - DNR/DNI; home hospice initiated  - symptom control  - continue pulmicort and oxygen support  - no indication for HFNC or BiPAP as per pulmonary  - appreciate Pulm consult.     Problem/Plan - 2:  ·  Problem: Chronic respiratory failure with hypoxia.   ·  Plan: due to COPD  - known to become hypoxic despite oxygen support  - aim for lower goal in O2 sats while on O2.     Problem/Plan - 3:  ·  Problem: Essential hypertension.   ·  Plan: continue losartan.     Problem/Plan - 4:  ·  Problem: BPH (benign prostatic hyperplasia).   ·  Plan: continue oxybutynin, flomax.     Problem/Plan - 5:  ·  Problem: Elevated hemoglobin A1c.   ·  Plan: will monitor for now  - treatment not consistent with GOC.

## 2024-05-17 NOTE — DISCHARGE NOTE NURSING/CASE MANAGEMENT/SOCIAL WORK - NSDCPEFALRISK_GEN_ALL_CORE
For information on Fall & Injury Prevention, visit: [email or web address] OR  [email or web address] OR  https://www.cdc.gov/steadi/patient.html

## 2024-05-17 NOTE — CHART NOTE - NSCHARTNOTEFT_GEN_A_CORE
Palliate notation reviewed - plan for symptom directed management and home hospice. Pulmonary to sign off at this time.    Garrick Hayward MD  Pulmonary/Critical Care  [phone] [facility]  [id number] [facility]

## 2024-05-17 NOTE — PROGRESS NOTE ADULT - NSPROGADDITIONALINFOA_GEN_ALL_CORE
Discussed with daughter by the bedside on 5/17 for 10 minutes  Answered all the questions.   Patient medically optimized for discharge.  Discharge planning 40 minutes - discussed with patient and consultants.

## 2024-05-17 NOTE — DISCHARGE NOTE PROVIDER - NSDCFUSCHEDAPPT_GEN_ALL_CORE_FT
[facility]  [facility] [address] [facility]  Scheduled Appointment: 06/07/2024

## 2024-05-17 NOTE — CHART NOTE - NSCHARTNOTEFT_GEN_A_CORE
[facility] NIGHT MEDICINE COVERAGE.    Notified by RN, pt c/o back pain and LLE "burning" per . Pt seen and examined, Emiliano  called ( ID#: _____). Per pt, _____    Per chart review, pt p/w hypoxia from pulmonologist office, during course here found to have progression of COPD disease. CTA performed to eval for PE, which was negative, and no evidence of any aneurysms as well. Pt s/p Gabapentin TID doses for 5/16. EKG ordered and personally reviewed, noted with deeper Twave inversions in II, III & V2-6, compared to prior EKG on 5/16 where Twave inversions were noted in II, III, AVF, with flat T-waves in V5 & V6. Due to new Twave inversions in V4 and deepening Twaves, will obtain stat AM labs including cardiac enzymes.    ~VITALS~    Stephanie CONDON  [facility] Medicine [id number] [facility] NIGHT MEDICINE COVERAGE.    Notified by RN, pt c/o back pain and LLE "burning" per . Pt seen and examined, Emiliano  called ( ID#: [id number]). Upon arrival, pt with 6LNC in place, wife at bedside massaging his left leg, pt sitting upright NAD, no WOB, appears comfortable. Per pt, he gets leg pain sometimes at home, nothing new and states its doing better. When asking about back pain he denies having any at this time. Per staff, pt was moaning and appeared restless. Per chart review, pt p/w hypoxia from pulmonologist office, during course here found to have progression of COPD disease. CTA performed to eval for PE, which was negative, and no evidence of any aneurysms as well. Pt s/p Gabapentin TID doses for 5/16. EKG ordered and personally reviewed, noted with deeper Twave inversions in II, III & V2-6, compared to prior EKG on 5/16 where Twave inversions were noted in II, III, AVF, with flat T-waves in V5 & V6. Due to new Twave inversions in V4 and deepening Twaves, will obtain stat AM labs including cardiac enzymes. Discussed obtaining lab work to which patient reports "hes in a hospital so he'll comply" but then refuses blood work at this time. Furthermore, pt was made DNR/DNI, Trial of NIV by his daughter on 5/16 with limited medical interventions. Will postpone lab work for now, IV Tylenol ordered, will give. Will c/t monitor.     ~VITALS    Stephanie CONDON  [facility] Medicine [id number] [facility] NIGHT MEDICINE COVERAGE.    Notified by RN, pt c/o back pain and LLE "burning" per . Pt seen and examined, Emiliano  called ( ID#: [id number]). Upon arrival, pt with 6LNC in place, wife at bedside massaging his left leg, pt sitting upright NAD, no WOB, appears comfortable. Per pt, he gets leg pain sometimes at home, nothing new and states its doing better. When asking about back pain he denies having any at this time. Per staff, pt was moaning and appeared restless. Per chart review, pt p/w hypoxia from pulmonologist office, during course here found to have progression of COPD disease. CTA performed to eval for PE, which was negative, and no evidence of any aneurysms as well. Pt s/p Gabapentin TID doses for 5/16. EKG ordered and personally reviewed, noted with deeper Twave inversions in II, III & V2-6, compared to prior EKG on 5/16 where Twave inversions were noted in II, III, AVF, with flat T-waves in V5 & V6. Due to new Twave inversions in V4 and deepening Twaves, will obtain stat AM labs including cardiac enzymes. Discussed obtaining lab work to which patient reports "hes in a hospital so he'll comply" but then refuses blood work at this time. Furthermore, pt was made DNR/DNI, Trial of NIV by his daughter on 5/16 with limited medical interventions. Will postpone lab work for now, IV Tylenol ordered, will give. Will c/t monitor.     Stephanie CONDON  [facility] Medicine [id number]

## 2024-05-17 NOTE — DISCHARGE NOTE PROVIDER - NSDCFUADDAPPT_GEN_ALL_CORE_FT
Follow up with PCP within 1-2 weeks of discharge. If you are in need of a general medicine physician and post-discharge medical follow-up for further care/recommendations you may contact the [facility] for an appointment at [phone].     Follow up with pulmonology within 1-2 weeks of discharge. Pulmonary/Sleep Clinic  [address] [place] [address]  [phone]

## 2024-05-17 NOTE — DISCHARGE NOTE PROVIDER - NSDCMRMEDTOKEN_GEN_ALL_CORE_FT
no bisacodyl 5 mg oral delayed release tablet: 1 tab(s) orally every 12 hours As needed Constipation  Breztri Aerosphere 160 mcg-9 mcg-4.8 mcg/inh inhalation aerosol: 2 puff(s) inhaled 2 times a day  budesonide 90 mcg/inh inhalation powder: 2 puff(s) inhaled 2 times a day  docusate sodium 100 mg oral capsule: 1 cap(s) orally 2 times a day as needed for  constipation  dorzolamide-timolol 2.23%-0.68% (2%-0.5% base) ophthalmic solution: 1 drop(s) in each eye 2 times a day  Flomax 0.4 mg oral capsule: 1 cap(s) orally once a day (at bedtime)  gabapentin 300 mg oral tablet: 1 tab(s) orally 3 times a day  latanoprost 0.005% ophthalmic solution: 1 drop(s) to each affected eye once a day (in the evening)  Lipitor 10 mg oral tablet: 1 tab(s) orally once a day  losartan 50 mg oral tablet: 1 tab(s) orally once a day  morphine 10 mg/5 mL oral solution: 1.25 milliliter(s) orally every 4 hours as needed for dyspnea MDD: 7.5mL  oxyBUTYnin 5 mg oral tablet: 1 tab(s) orally 2 times a day  Pepcid 40 mg oral tablet: 1 tab(s) orally 2 times a day  senna leaf extract oral tablet: 2 tab(s) orally once a day (at bedtime)  traZODone 100 mg oral tablet: 1 tab(s) orally once a day (at bedtime)  Ventolin HFA 90 mcg/inh inhalation aerosol: 1 puff(s) inhaled every 6 hours as needed for shortness of breath.

## 2024-05-17 NOTE — PROGRESS NOTE ADULT - SUBJECTIVE AND OBJECTIVE BOX
[facility] Division of Hospital Medicine  David Lawrence MD  Pager (YESSENIA-DAREN, 8Q-5P): [phone]  Other Times:  [phone]    Patient is a 68y old  Male who presents with a chief complaint of Hypoxia at MD office (17 May 2024 10:40)    SUBJECTIVE / OVERNIGHT EVENTS:  Patient had multiple episodes of hypoxia during the hospital stay despite O2 support - however no LOC. Baseline dypsnea/SOB present. No F/C, N/V, CP, Cough, lightheadedness, dizziness, abdominal pain, diarrhea, dysuria. Daughter by the bedside.     MEDICATIONS  (STANDING):  atorvastatin 10 milliGRAM(s) Oral at bedtime  buDESOnide   90 MICROgram(s) Inhaler 2 Puff(s) Inhalation two times a day  dorzolamide 2%/timolol 0.5% Ophthalmic Solution 1 Drop(s) Both EYES two times a day  famotidine    Tablet 20 milliGRAM(s) Oral daily  gabapentin 300 milliGRAM(s) Oral three times a day  glycopyrrolate 9 MICROgram(s)/formoterol 4.8 MICROgram(s) Inhaler 2 Puff(s) Inhalation two times a day  latanoprost 0.005% Ophthalmic Solution 1 Drop(s) Both EYES at bedtime  losartan 50 milliGRAM(s) Oral daily  oxybutynin 5 milliGRAM(s) Oral two times a day  senna 2 Tablet(s) Oral at bedtime  tamsulosin 0.4 milliGRAM(s) Oral at bedtime  traZODone 100 milliGRAM(s) Oral at bedtime    MEDICATIONS  (PRN):  acetaminophen     Tablet .. 650 milliGRAM(s) Oral every 6 hours PRN Temp greater or equal to 38C (100.4F), Mild Pain (1 - 3)  albuterol    90 MICROgram(s) HFA Inhaler 1 Puff(s) Inhalation every 6 hours PRN Shortness of Breath and/or Wheezing  aluminum hydroxide/magnesium hydroxide/simethicone Suspension 30 milliLiter(s) Oral every 4 hours PRN Dyspepsia  bisacodyl 5 milliGRAM(s) Oral every 12 hours PRN Constipation  melatonin 3 milliGRAM(s) Oral at bedtime PRN Insomnia  morphine   Solution 2.5 milliGRAM(s) Oral every 4 hours PRN dyspnea  ondansetron Injectable 4 milliGRAM(s) IV Push every 8 hours PRN Nausea and/or Vomiting      Vital Signs Last 24 Hrs  T(C): 36.7 (17 May 2024 11:00), Max: 36.8 (17 May 2024 03:10)  T(F): 98 (17 May 2024 11:00), Max: 98.2 (17 May 2024 03:10)  HR: 69 (17 May 2024 11:00) (66 - 69)  BP: 146/84 (17 May 2024 11:00) (146/84 - 170/100)  BP(mean): --  RR: 17 (17 May 2024 11:00) (17 - 19)  SpO2: 93% (17 May 2024 11:00) (88% - 93%)    Parameters below as of 17 May 2024 11:00  Patient On (Oxygen Delivery Method): nasal cannula  O2 Flow (L/min): 6    CAPILLARY BLOOD GLUCOSE        I&O's Summary    16 May 2024 07:01  -  17 May 2024 07:00  --------------------------------------------------------  IN: 0 mL / OUT: 200 mL / NET: -200 mL        PHYSICAL EXAM:  CONSTITUTIONAL: NAD  EYES: PERRLA; conjunctiva and sclera clear  ENMT: Moist oral mucosa, no pharyngeal injection or exudates; normal dentition  NECK: Supple, no palpable masses; no thyromegaly  RESPIRATORY: Poor respiratory effort; quiet BS overall  CARDIOVASCULAR: Regular rate and rhythm, normal S1 and S2, no murmur/rub/gallop; No lower extremity edema; Peripheral pulses are 2+ bilaterally  ABDOMEN: Nontender to palpation, normoactive bowel sounds, no rebound/guarding; No hepatosplenomegaly  MUSCULOSKELETAL:  Did not assess gait; no clubbing or cyanosis of digits; no joint swelling or tenderness to palpation  PSYCH: A+O to person, place, and time; affect appropriate  NEUROLOGY: CN 2-12 are intact and symmetric; no gross sensory deficits   SKIN: No rashes; no palpable lesions    LABS:                        15.5   8.85  )-----------( 174      ( 16 May 2024 06:47 )             45.2     05-16    135  |  100  |  12  ----------------------------<  202<H>  3.4<L>   |  21<L>  |  0.88    Ca    9.1      16 May 2024 06:57  Phos  3.0     05-16  Mg     2.20     05-16    TPro  7.4  /  Alb  4.0  /  TBili  0.4  /  DBili  x   /  AST  19  /  ALT  15  /  AlkPhos  107  05-16    PT/INR - ( 15 May 2024 17:46 )   PT: 12.3 sec;   INR: 1.10 ratio         PTT - ( 15 May 2024 17:46 )  PTT:32.3 sec      Urinalysis Basic - ( 16 May 2024 06:57 )    Color: x / Appearance: x / SG: x / pH: x  Gluc: 202 mg/dL / Ketone: x  / Bili: x / Urobili: x   Blood: x / Protein: x / Nitrite: x   Leuk Esterase: x / RBC: x / WBC x   Sq Epi: x / Non Sq Epi: x / Bacteria: x        RADIOLOGY & ADDITIONAL TESTS:    Imaging Personally Reviewed:    Care Discussed with Consultants/Other Providers:

## 2024-05-17 NOTE — ED PROVIDER NOTE - ATTENDING CONTRIBUTION TO CARE
Curtis Patino DO:  patient seen and evaluated with the resident.  I was present for key portions of the History & Physical, and I agree with the Impression & Plan. Curtis Patino DO:  patient seen and evaluated with the resident.  I was present for key portions of the History & Physical, and I agree with the Impression & Plan..

## 2024-05-17 NOTE — PROGRESS NOTE ADULT - SUBJECTIVE AND OBJECTIVE BOX
[facility] Geriatrics and Palliative Care  Aria Cummins MD, Palliative Care Attending  Contact Info: Page [phone] (including Nights/Weekends), message on Microsoft Teams (Aria Cummins MD), or leave VM at Palliative Office [phone] (non-urgent)   Date of Ywpqfek42-05-42 @ 18:06    SUBJECTIVE AND OBJECTIVE:  Patient seen this morning at bedside. They are accompanied by their Mago  Pt endorses pain with sitting, denies any dyspnea  Daughter shared that they'll be speaking to hospice      Indication for Geriatrics and Palliative Care Services/INTERVAL HPI: goals of care    OVERNIGHT EVENTS:  Desaturation to 70%     DNR on chart:DNI: Trial NIV  DNI: Trial NIV      Allergies    No Known Allergies    Intolerances    MEDICATIONS  (STANDING):  atorvastatin 10 milliGRAM(s) Oral at bedtime  buDESOnide   90 MICROgram(s) Inhaler 2 Puff(s) Inhalation two times a day  dorzolamide 2%/timolol 0.5% Ophthalmic Solution 1 Drop(s) Both EYES two times a day  famotidine    Tablet 20 milliGRAM(s) Oral daily  gabapentin 300 milliGRAM(s) Oral three times a day  glycopyrrolate 9 MICROgram(s)/formoterol 4.8 MICROgram(s) Inhaler 2 Puff(s) Inhalation two times a day  latanoprost 0.005% Ophthalmic Solution 1 Drop(s) Both EYES at bedtime  losartan 50 milliGRAM(s) Oral daily  oxybutynin 5 milliGRAM(s) Oral two times a day  senna 2 Tablet(s) Oral at bedtime  tamsulosin 0.4 milliGRAM(s) Oral at bedtime  traZODone 100 milliGRAM(s) Oral at bedtime    MEDICATIONS  (PRN):  acetaminophen     Tablet .. 650 milliGRAM(s) Oral every 6 hours PRN Temp greater or equal to 38C (100.4F), Mild Pain (1 - 3)  albuterol    90 MICROgram(s) HFA Inhaler 1 Puff(s) Inhalation every 6 hours PRN Shortness of Breath and/or Wheezing  aluminum hydroxide/magnesium hydroxide/simethicone Suspension 30 milliLiter(s) Oral every 4 hours PRN Dyspepsia  bisacodyl 5 milliGRAM(s) Oral every 12 hours PRN Constipation  melatonin 3 milliGRAM(s) Oral at bedtime PRN Insomnia  morphine   Solution 2.5 milliGRAM(s) Oral every 4 hours PRN dyspnea  ondansetron Injectable 4 milliGRAM(s) IV Push every 8 hours PRN Nausea and/or Vomiting      ITEMS UNCHECKED ARE NOT PRESENT    PRESENT SYMPTOMS: [ ]Unable to self-report - see [ ] CPOT [ ] PAINADS [ ] RDOS  Source if other than patient:  [ ]Family   [ ]Team     Pain:  [ x]yes [ ]no - limited hx as pt was groggy this morning   QOL impact -   Location -         Lower extremity/back            Aggravating factors -  Quality -  Radiation -  Timing-  Severity (0-10 scale):  Minimal acceptable level/ pain goal (0-10 scale):     CPOT:    https://www.[facility].org/getattachment/qky61g03-8u6g-7h5l-[email or web address]-3700z4505b1t/Critical-Care-Pain-Observation-Tool-(CPOT)    Dyspnea:                           [ ]Mild [ ]Moderate [ ]Severe  Anxiety:                             [ ]Mild [ ]Moderate [ ]Severe  Fatigue:                             [ ]Mild [ ]Moderate [ ]Severe  Nausea:                             [ ]Mild [ ]Moderate [ ]Severe  Loss of appetite:              [ ]Mild [ ]Moderate [ ]Severe  Constipation:                    [ ]Mild [ ]Moderate [ ]Severe  Other Symptoms:  [ ]All other review of systems negative     PCSSQ[Palliative Care Spiritual Screening Question]   Severity (0-10):  Score of 4 or > indicate consideration of Chaplaincy referral.  Chaplaincy Referral: [ ] yes [ ] refused [ ] following [ ] deferred    Caregiver [place]? : [ ] yes [ ] no [ ] Deferred [ ] Declined             Social work referral [ ] Patient & Family Centered Care Referral [ ]  Anticipatory Grief present?:  [ ] yes [ ] no  [ ] Deferred                  Social work referral [ ] Patient & Family Centered Care Referral [ ]      PHYSICAL EXAM:  Vital Signs Last 24 Hrs  T(C): 36.7 (17 May 2024 17:20), Max: 36.8 (17 May 2024 03:10)  T(F): 98.1 (17 May 2024 17:20), Max: 98.2 (17 May 2024 03:10)  HR: 63 (17 May 2024 17:20) (63 - 69)  BP: 138/76 (17 May 2024 17:20) (138/76 - 170/100)  BP(mean): --  RR: 18 (17 May 2024 17:20) (17 - 19)  SpO2: 95% (17 May 2024 17:20) (88% - 95%)    Parameters below as of 17 May 2024 17:20  Patient On (Oxygen Delivery Method): nasal cannula  O2 Flow (L/min): 6   I&O's Summary    16 May 2024 07:01  -  17 May 2024 07:00  --------------------------------------------------------  IN: 0 mL / OUT: 200 mL / NET: -200 mL         General: Laying in bed, restless   Eyes: no icterus, conjunctiva clear  Chest: symmetric excursion, no accessory muscle use  Heart: peripheral pulse 2+ and regular  Lungs: no dyspnea with speech,, on 4L NC  Abdomen: soft, NT, ND  Ext: no cyanosis, clubbing, visible veins, ulcers, wounds  Neuro: alert, coherent speech, grossly non-focal  Psych: calm, rational, linear thought process  Skin: ]Normal  [ ]Rash  [ ]Other  [ ]Pressure ulcer(s) [ ]y [ ]n present on admission    CRITICAL CARE:  [ ]Shock Present  [ ]Septic [ ]Cardiogenic [ ]Neurologic [ ]Hypovolemic  [ ]Vasopressors [ ]Inotropes  [ ]Respiratory failure present [ ]Mechanical Ventilation [ ]Non-invasive ventilatory support [ ]High-Flow   [ ]Acute  [ ]Chronic [ ]Hypoxic  [ ]Hypercarbic [ ]Other  [ ]Other organ failure     LABS:                        15.5   8.85  )-----------( 174      ( 16 May 2024 06:47 )             45.2   05-16    135  |  100  |  12  ----------------------------<  202<H>  3.4<L>   |  21<L>  |  0.88    Ca    9.1      16 May 2024 06:57  Phos  3.0     05-16  Mg     2.20     05-16    TPro  7.4  /  Alb  4.0  /  TBili  0.4  /  DBili  x   /  AST  19  /  ALT  15  /  AlkPhos  107  05-16      Urinalysis Basic - ( 16 May 2024 06:57 )    Color: x / Appearance: x / SG: x / pH: x  Gluc: 202 mg/dL / Ketone: x  / Bili: x / Urobili: x   Blood: x / Protein: x / Nitrite: x   Leuk Esterase: x / RBC: x / WBC x   Sq Epi: x / Non Sq Epi: x / Bacteria: x      RADIOLOGY & ADDITIONAL STUDIES:    Protein Calorie Malnutrition Present: [ ]mild [ ]moderate [ ]severe [ ]underweight [ ]morbid obesity  https://www.andeal.org/vault/2440/web/files/ONC/Table_Clinical%20Characteristics%20to%20Document%20Malnutrition-White%20JV%20et%20al%202012.pdf    Height (cm): 162.6 (05-16-24 @ 02:10)  Weight (kg): 52.2 (05-16-24 @ 02:10)  BMI (kg/m2): 19.7 (05-16-24 @ 02:10)    [ ]PPSV2 < or = 30%  [ ]significant weight loss [ ]poor nutritional intake [ ]anasarca[ ]Artificial Nutrition    Other REFERRALS:  [ ]Hospice  [ ]Child Life  [ ]Social Work  [ ]Case management [ ]Holistic Therapy     Goals of Care Document:AYO Cummins (05-16-24 @ 19:10)  Goals of Care Conversation:   Participants:  · Participants  Patient; Family; Staff  · Child(shila)  Stephania Mercedes (daughter)  · Provider  Dr. Cummins    Advance Directives:  · Does Patient Have a Surrogate  Yes  · Surrogate's Name  Daughter: Stephania Mercedes  · Surrogate's Phone Number  [phone]  · Caregiver:  no    Conversation Discussion:  · Conversation  Diagnosis; Prognosis; MOLST Discussed; Treatment Options; Hospice Referral; Palliative Care Referral  · Conversation Details  Palliative consulted for complex medical decision making and symptom management in the setting of advanced end staged COPD.  Spoke with pt along with aliza Cat to introduce role and scope of palliative care team as supportive in the setting of complex comorbidities/serious illnesses, to assist with complex medical decision making and any associated pain or symptom management. We reviewed baseline function, comorbidities, current hospital course and plan of care.  Patient defers decisions to daughter who shared hx.    Pt lives at home with wife and daughter Stephania (who was a physician in [facility]) and daughter's family. Because of his severe lung disease he's mostly bedbound with minimal activity can desaturate  to the 60s and requires frequent inhaler use. He's not been able to go to the doctors because of the severe fatigue/dyspnea with minimal activity. Yesterday they went to the pulmonologist as he's run out of meds and were advised to come to the ED and at this point have been recommended hospice. Stephania shared that it's been hard to hear that dad is at end stage disease. I shared with her that patient is not a candidate for lung transplant or any further advance treatment.     Educated family on the philosophy of hospice care and explained the various settings and criteria in which hospice can be delivered (home vs. nursing home vs. inpatient hospice). Discussed the services provided under hospice services emphasizing the goal of elevating patient's quality of life and optimizing symptom management and avoiding unnecessary future hospitalizations. In addition to symptom management expertise, hospice provides supportive counseling services, nutritional support and chaplaincy services.     Daughter shared that patient does not like to come to the hospital and has always shared that whenever it's his time he would want to pass away at home and not at a hospital. Patient enjoys spending time with his family and hopes to return to [facility] but understands its not safe given his oxygen level. Given his end stage COPD, daughter is amenable to home hospice referral.    Discussed advanced directives including CPR and mechanical ventilation in setting of end stage copd. Reviewed the risks and benefits of these interventions at the EOL in sharing that these interventions might pose more burden than benefit as he does not have a reversible disease. Daughter shared that given his above goals DNR/DNI is in lines with goal. I asked patient if those are his wishes and he shared that death is inevitable in life and as such if it's his time he would not want to be in machines or the ICU and would want to pass away naturally.     Completed MOLST form: DNR/DNI, trial of noninvasive, no feeding tube, no HD, abx use as determined, No ICU or pressors    What Matters Most To Patient and Family:  · What matters most to patient and family  Quality of life with good symptoms management.    Personal Advance Directives Treatment Guidelines:   Treatment Guidelines:  · Decision Maker  Surrogate  · Treatment Guidelines  DNR; DNI; Antibiotic trial  · Treatment Guideline Comments  DNR/DNI, trial of noninvasive, no feeding tube, no HD, abx use as determined, No ICU or pressors    MOLST:  · Completed  16-May-2024    Location of Discussion:   Time Spent on Advance Care Planning:  Attending or HEIDY Only.     I personally spent 52 minutes on advance care planning services with the patient. This time is separate and distinct from any other care management services provided on this date.    Location of Discussion:  · Location of discussion  Face to face      Electronic Signatures:  Aria Cummins)  (Signed 16-May-2024 19:21)  	Authored: Goals of Care Conversation, Personal Advance Directives Treatment Guidelines, Location of Discussion      Last Updated: 16-May-2024 19:21 by Aria Cummins)

## 2024-05-17 NOTE — PROGRESS NOTE ADULT - PROBLEM SELECTOR PLAN 6
Thank you for allowing us to participate in your patient's care. We will continue to follow with you. Please page [phone] for any q's or c's. The Geriatric and Palliative Medicine service has coverage 24 hours a day/ 7 days a week to provide medical recommendations regarding symptom management needs via telephone.    Aria Cummins MD  Palliative Medicine

## 2024-05-17 NOTE — DISCHARGE NOTE PROVIDER - NSDCCPCAREPLAN_GEN_ALL_CORE_FT
PRINCIPAL DISCHARGE DIAGNOSIS  Diagnosis: End stage COPD  Assessment and Plan of Treatment: You were admitted for end-stage COPD. You have opted to be discharged with home hospice services. Continue newly started Pulmicort and Morphine for shortness of breath.

## 2024-05-17 NOTE — PROGRESS NOTE ADULT - PROBLEM SELECTOR PLAN 1
appreciate pulmonary team consult  - unfortunately, patient is determined to be end-stage COPD  - appreciate Palliative care consult for discussion about hospice and GOC  - DNR/DNI; home hospice initiated  - symptom control  - continue pulmicort and oxygen support  - no indication for HFNC or BiPAP as per pulmonary  - appreciate Pulm consult
Patient at baseline on 4L NC but desaturates to the 60s at home with minimal exertion, as such bedbound  Follows [facility] pulm team -- has not been able to visit inperson because of severe symptoms until yesterday  CT with emphysema; "PFTs outpatient in 2023 were very poor effort limited by dyspnea but had at least moderate obstruction"  Pulm evaluation appreciated -- recommended hospice in the setting of severe emphysema, not candidate for transplant;   PLAN:  Continue triple inhaler therapy (ICS/LABA/LAMA) per pulm  Per conversation with daughter as documented separately, amenable to home hospice referral; DNR/DNI/trial of bipap  For dyspnea: start morphine 2.5 mg q4h prn --> if not effective, increase to 5 mg q4h prn; spoke to daughter about utilizing morphine as needed for pain as well   If severe anxiety: can consider ativan 0.5 mg q6h prn  Bowel regimen on opioid.
appreciate pulmonary team consult  - unfortunately, patient is determined to be end-stage COPD  - Palliative care consult for discussion about hospice and GOC  - Hospitalist team had discussion on 5/15 - at that time, family wishes for full code.  - symptom control  - continue pulmicort and oxygen support  - no indication for HFNC or BiPAP as per pulmonary

## 2024-05-17 NOTE — ED ADULT TRIAGE NOTE - CHIEF COMPLAINT QUOTE
Patient brought in by EMS for difficulty breathing. Was d/c today after being treated for COPD. Per EMS, pt. 90's on room air. Given 1 duoneb. Arrives on 15L NRB, 94%. PMH COPD, HTN, BPH.

## 2024-05-17 NOTE — PROGRESS NOTE ADULT - TIME BILLING
Preparing to see the patient including review of tests and other providers' notes, confirming history with family member, performing medical examination and evaluation, counseling and educating the patient/family/caregiver, ordering medications, tests and procedures, communicating with other health care professionals, documenting clinical information in the EMR, independently interpreting results and communicating results to the patient/family/caregiven, care coordination.
Time spent for extensive review of the physical chart, electronic medical record, and documentation to obtain collateral information including but not limited to:  [x ] Inpatient records (ED, H&P, primary team, and consultants if applicable, care coordination)  [x] Inpatient values/results (biomarkers, immunoassays, imaging, and microbiology results)  [x] Current or proposed treatment plans  [x] Discussion with the primary team and hospice team   [x] Discussion with the patient, surrogate decision maker, or family    Time spent: >50mins (separate from acp)

## 2024-05-17 NOTE — ED ADULT NURSE NOTE - NSFALLHARMRISKINTERV_ED_ALL_ED

## 2024-05-17 NOTE — DISCHARGE NOTE NURSING/CASE MANAGEMENT/SOCIAL WORK - NSDCFUADDAPPT_GEN_ALL_CORE_FT
Follow up with PCP within 1-2 weeks of discharge. If you are in need of a general medicine physician and post-discharge medical follow-up for further care/recommendations you may contact the [facility] for an appointment at [phone].     Follow up with pulmonology within 1-2 weeks of discharge. Pulmonary/Sleep Clinic  [address] [place] [address]  [phone]

## 2024-05-17 NOTE — PROGRESS NOTE ADULT - PROBLEM SELECTOR PLAN 4
continue oxybutynin, flomax
Please see separate goc documented from 5/17: in summary pt defers decisions to his daughter Stephania Mercedes who understands that patient has end stage COPD for which there's not further advance treatment options available and for whom hospice has been recommended. In discussing hospice, she's amenable to home hospice referral. Also addressed code status, and daughter shared her father has always expressed wanting to die at home and in discussed ACP, pt and daughter agreed to DNR/DNI, trial of non-invasives.     5/18: Pt/daughter spoke to [facility]; per [facility] nurse patient at this time deferring hospice as they don't want to stop homecare, however pt's daughter was amenable to hospice. Ultimately, hospice team has provided information/has community referral for if/when family is ready for hospice.
continue oxybutynin, flomax

## 2024-05-17 NOTE — DISCHARGE NOTE NURSING/CASE MANAGEMENT/SOCIAL WORK - PATIENT PORTAL LINK FT
You can access the FollowMyHealth Patient Portal offered by [facility] by registering at the following website: [email or web address]. By joining [employer]’s FollowMyHealth portal, you will also be able to view your health information using other applications (apps) compatible with our system.

## 2024-05-17 NOTE — PROGRESS NOTE ADULT - PROBLEM SELECTOR PLAN 2
due to COPD  - known to become hypoxic despite oxygen support  - aim for lower goal in O2 sats while on O2
Bowel regimen on opioids
due to COPD  - known to become hypoxic despite oxygen support  - aim for lower goal in O2 sats while on O2

## 2024-05-17 NOTE — ED PROVIDER NOTE - PATIENT PORTAL LINK FT
You can access the FollowMyHealth Patient Portal offered by [facility] by registering at the following website: [email or web address]. By joining [employer]’s FollowMyHealth portal, you will also be able to view your health information using other applications (apps) compatible with our system.

## 2024-05-17 NOTE — ED PROVIDER NOTE - CLINICAL SUMMARY MEDICAL DECISION MAKING FREE TEXT BOX
Curtis Patino, DO: 69 yo m pmh  end stage COPD-4.5L O2, HTN, BPH and glaucoma, pw near syncope. admitted to [facility] from 5/15-17, for sob and made DNR/DNI and transitioned to home hospice care. EMR independent reviewed by me.  Appears the patient was discharged to home hospice today.  However upon returning home patient appeared to nearly lose consciousness several times.  EMS evaluated patient and agreed with family.  Brought here for further evaluation.  Daughter bedside is caretaker and provides collateral.  Of note she is a physician, pending taking her board exam And has good medical insight.  Reports that she brought him to evaluation because of near syncope.  However states that she would want to return home does not see a point of hospitalization.  Patient arrives hypoxic, tachypneic.  Transition from facemask to 6 L on nasal cannula.  EKG concerning for T wave inversions, change from 2023.  Will check labs, imaging, reassess.  However goal to send patient home.

## 2024-05-17 NOTE — ED ADULT NURSE REASSESSMENT NOTE - NS ED NURSE REASSESS COMMENT FT1
20g peripheral IV placed on right posterior forearm, Labs obtained and sent. Meds administered per eMAR. Vancomycin Assessment    Sajan Hummel is a 33 y o  male who is currently receiving vancomycin 1250mg/kg iv q12 for Pneumonia unknown source of fever   Relevant clinical data and objective history reviewed:  Creatinine   Date Value Ref Range Status   06/05/2020 0 37 (L) 0 60 - 1 30 mg/dL Final     Comment:     Standardized to IDMS reference method   06/05/2020 0 35 (L) 0 60 - 1 30 mg/dL Final     Comment:     Standardized to IDMS reference method   06/04/2020 0 32 (L) 0 60 - 1 30 mg/dL Final     Comment:     Standardized to IDMS reference method     /78   Pulse (!) 116   Temp 100 1 °F (37 8 °C)   Resp (!) 36   Ht 5' 7" (1 702 m)   Wt 118 kg (260 lb 9 3 oz)   SpO2 94%   BMI 40 81 kg/m²   I/O last 3 completed shifts: In: 1050 [I V :50; NG/GT:690; IV Piggyback:230; Feedings:80]  Out: 450 [Stool:450]  Lab Results   Component Value Date/Time    BUN 10 06/05/2020 11:10 PM    WBC 4 94 06/05/2020 11:10 PM    HGB 8 5 (L) 06/05/2020 11:10 PM    HCT 28 4 (L) 06/05/2020 11:10 PM     (H) 06/05/2020 11:10 PM     (L) 06/05/2020 11:10 PM     Temp Readings from Last 3 Encounters:   06/05/20 100 1 °F (37 8 °C)   04/28/20 97 9 °F (36 6 °C) (Axillary)   03/18/20 97 8 °F (36 6 °C) (Oral)     Vancomycin Days of Therapy: 1    Assessment/Plan  The patient is currently on vancomycin utilizing scheduled dosing based on adjusted body weight (due to obesity)  Baseline risks associated with therapy include: concomitant nephrotoxic medications and dehydration  The patient is currently receiving 1250mg/kg iv q12 and is clinically appropriate and dose will be continued  Pharmacy will also follow closely for s/sx of nephrotoxicity, infusion reactions and appropriateness of therapy  BMP and CBC will be ordered per protocol  Plan for trough as patient approaches steady state, prior to the 4th  dose at approximately 1430 06/07/2020    Due to infection severity, will target a trough of 15-20 (appropriate for most indications)   Pharmacy will continue to follow the patients culture results and clinical progress daily      Bibiana Trevizo, Pharmacist

## 2024-05-17 NOTE — PROGRESS NOTE ADULT - PROBLEM SELECTOR PROBLEM 4
Advanced care planning/counseling discussion
BPH (benign prostatic hyperplasia)
BPH (benign prostatic hyperplasia)

## 2024-05-17 NOTE — PROGRESS NOTE ADULT - ASSESSMENT
68M COPD-4.5L O2, HTN, BPH, glaucoma p/w Progression to end-stage COPD w/ acute hypoxia, elevated A1c.
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  Palliative consulted for complex medical decision making and symptom management in the setting of serious illness.  
68M COPD-4.5L O2, HTN, BPH, glaucoma p/w Progression to end-stage COPD w/ acute hypoxia, elevated A1c.

## 2024-05-17 NOTE — ED PROVIDER NOTE - PROGRESS NOTE DETAILS
Family requesting that patient receive IV antibiotics at home on hospice.  Spoke to  who stated that case management will have to see the patient in the morning and that generally IV antibiotics cannot be arranged out of the ER.  Told family about this and explained cough worsening chest x-ray.  Family expresses understanding and states that patient would not like to stay in the hospital for IV antibiotics.  They would like to have the patient take oral antibiotics after an IV dose in the ER.  They understand that the patient a worsening condition that may lead to death however they stated that he has expressed that he would not want this to happen in the hospital. Will send abx to pharmacy.     aSdie Zelaya MD, PGY3 Awaiting ambulance. Family would like to take patient home. Patient has had desaturations to 70s placed on NRB. Family expresses understanding that patient has had desaturations and this may happen at home and may lead to worsening clinical state/death. Family expresses understanding.     Sadie Zelaya MD, PGY3

## 2024-05-17 NOTE — ED PROVIDER NOTE - CARE PLAN
Monitor: The problem is improving with treatment.  Evaluation: No labs/tests required today.  Assessment/Treatment:  Will increase dose of semaglutide to 0.5mg qweekly.  Continue dietary modifications.  Follow up in 1 month for monitoring of therapy and titration of dose for weight loss.   1 Principal Discharge DX:	Pneumonia

## 2024-05-17 NOTE — PROGRESS NOTE ADULT - PROBLEM SELECTOR PLAN 5
PPSV2: 30  -Patient requires total care   -Good skin care as per floor policy.
will monitor for now  - treatment options to be discussed in GOC
will monitor for now  - treatment not consistent with GOC

## 2024-05-17 NOTE — ED PROVIDER NOTE - NSFOLLOWUPINSTRUCTIONS_ED_ALL_ED_FT
You were seen for shortness of breath.  You have a pneumonia.  You are prescribed Levaquin that was sent to your pharmacy that you should take once a day.  Please return to the ER for worsening shortness of breath, fevers, chills, chest pain.  Follow-up with your primary care doctor in 2 days.

## 2024-05-18 PROBLEM — H40.9 UNSPECIFIED GLAUCOMA: Chronic | Status: ACTIVE | Noted: 2024-01-01

## 2024-05-18 PROBLEM — Z86.19 PERSONAL HISTORY OF OTHER INFECTIOUS AND PARASITIC DISEASES: Chronic | Status: ACTIVE | Noted: 2024-01-01

## 2024-05-18 PROBLEM — E78.5 HYPERLIPIDEMIA, UNSPECIFIED: Chronic | Status: ACTIVE | Noted: 2024-01-01

## 2024-05-18 PROBLEM — J44.9 CHRONIC OBSTRUCTIVE PULMONARY DISEASE, UNSPECIFIED: Chronic | Status: ACTIVE | Noted: 2024-01-01

## 2024-05-18 NOTE — ED ADULT NURSE REASSESSMENT NOTE - NS ED NURSE REASSESS COMMENT FT1
break coverage rn. received report from KATHERIN duffy. pt on hospice care per primary RN. satting 88-89% on 6L NC. Awaiting IV abx from pharmacy. safety maintained. family at bedside and made aware

## 2024-05-18 NOTE — PROVIDER CONTACT NOTE (OTHER) - BACKGROUND
[facility] called with job#[id number] given and an estimated wait time of 30-60 minutes. PT will travel with family and O2 tank.

## 2024-05-18 NOTE — ED ADULT NURSE REASSESSMENT NOTE - NS ED NURSE REASSESS COMMENT FT1
[facility] Pt. was [facility]. As per MD Blanchard, note was written stating pt. was fine to be discharged. Will take meds when he gets home.

## 2024-05-18 NOTE — ED ADULT NURSE REASSESSMENT NOTE - NS ED NURSE REASSESS COMMENT FT1
Received rpt from KATHERIN Jimenez. Pt. A&OX3, with family at bedside. Awaiting pickup. Pt. c/o that the non-rebreather mask is itching his face. Placed on 6L NC. Respirations even and unlabored. Will monitor O2 level.

## 2024-05-19 NOTE — ED POST DISCHARGE NOTE - RESULT SUMMARY
Patient Son called asking for medication sent by ER to be sent to another [facility]. Levofloxacin rx by ER resent to preferred pharmacy.

## 2024-06-07 PROBLEM — J44.9 COPD WITH HYPOXIA: Status: ACTIVE | Noted: 2023-03-06

## 2024-06-07 NOTE — PHYSICAL EXAM
[Ill-Appearing] : ill-appearing [Cachectic] : cachexia was observed [Normal Affect] : the affect was normal

## 2024-06-10 NOTE — HISTORY OF PRESENT ILLNESS
[Post-hospitalization from ___ Hospital] : Post-hospitalization from [unfilled] Hospital [Admitted on: ___] : The patient was admitted on [unfilled] [Discharged on ___] : discharged on [unfilled] [Patient Contacted By: ____] : and contacted by [unfilled] [Home] : at home, [unfilled] , at the time of the visit. [Medical Office: ([facility])___] : at the medical office located in  [Verbal consent obtained from patient] : the patient, [unfilled] [FreeTextEntry2] : 67 yo M with COPD was determined to be end stage COPD. Pt made DNR by family. Family accepted hospice care, but patient declined. Pt not completely aware of his prognosis. Since being home, pt has required 10L of O2 to feel comfortable. O2 is 91-92% at 10L. He cries most of the time and does not eat but when family reassure him everything is going to be ok, his mood gets a bit better. Pt's family would like to keep prognosis away from patient for now. He is mostly bed bound now, gets hypoxic even with minimal movement. He has good days and bad days.  Morphine helps settle him a bit. Only using at night time to help him sleep. Daughter is managing his O2 currently and is starting to struggle as his O2 requirements continue to go up. In mean time, pt will recontact hospice network. She would also need 2nd O2 concentrator that can help him feel a bit more comfortable as the currently one is operating at max capacity.

## 2024-06-19 ENCOUNTER — APPOINTMENT (OUTPATIENT)
Dept: GERIATRICS | Facility: CLINIC | Age: 69
End: 2024-06-19
